# Patient Record
Sex: MALE | Race: BLACK OR AFRICAN AMERICAN | NOT HISPANIC OR LATINO | Employment: OTHER | URBAN - METROPOLITAN AREA
[De-identification: names, ages, dates, MRNs, and addresses within clinical notes are randomized per-mention and may not be internally consistent; named-entity substitution may affect disease eponyms.]

---

## 2017-01-13 RX ORDER — CARBAMAZEPINE 200 MG/1
TABLET, EXTENDED RELEASE ORAL
Qty: 60 TABLET | Refills: 0 | OUTPATIENT
Start: 2017-01-13

## 2017-01-13 RX ORDER — INSULIN DETEMIR 100 [IU]/ML
INJECTION, SOLUTION SUBCUTANEOUS
Refills: 0 | OUTPATIENT
Start: 2017-01-13

## 2017-01-13 RX ORDER — INSULIN ASPART 100 [IU]/ML
INJECTION, SOLUTION INTRAVENOUS; SUBCUTANEOUS
Qty: 15 SYRINGE | Refills: 0 | OUTPATIENT
Start: 2017-01-13

## 2018-05-18 ENCOUNTER — HOSPITAL ENCOUNTER (EMERGENCY)
Facility: HOSPITAL | Age: 38
Discharge: HOME | End: 2018-05-18
Attending: EMERGENCY MEDICINE
Payer: MEDICARE

## 2018-05-18 VITALS
HEIGHT: 69 IN | RESPIRATION RATE: 18 BRPM | SYSTOLIC BLOOD PRESSURE: 107 MMHG | TEMPERATURE: 97 F | OXYGEN SATURATION: 100 % | BODY MASS INDEX: 20.73 KG/M2 | WEIGHT: 140 LBS | DIASTOLIC BLOOD PRESSURE: 67 MMHG | HEART RATE: 72 BPM

## 2018-05-18 DIAGNOSIS — E86.0 DEHYDRATION: ICD-10-CM

## 2018-05-18 DIAGNOSIS — R73.9 HYPERGLYCEMIA: Primary | ICD-10-CM

## 2018-05-18 LAB
ALBUMIN SERPL BCP-MCNC: 3.3 G/DL
ALLENS TEST: ABNORMAL
ALP SERPL-CCNC: 113 U/L
ALT SERPL W/O P-5'-P-CCNC: 9 U/L
ANION GAP SERPL CALC-SCNC: 12 MMOL/L
AST SERPL-CCNC: 9 U/L
B-OH-BUTYR BLD STRIP-SCNC: 3.4 MMOL/L
BACTERIA #/AREA URNS HPF: NORMAL /HPF
BASOPHILS # BLD AUTO: 0.03 K/UL
BASOPHILS NFR BLD: 0.3 %
BILIRUB SERPL-MCNC: 0.3 MG/DL
BILIRUB UR QL STRIP: NEGATIVE
BUN SERPL-MCNC: 19 MG/DL
CALCIUM SERPL-MCNC: 9.4 MG/DL
CHLORIDE SERPL-SCNC: 94 MMOL/L
CLARITY UR: CLEAR
CO2 SERPL-SCNC: 24 MMOL/L
COLOR UR: ABNORMAL
CREAT SERPL-MCNC: 1.7 MG/DL
DIFFERENTIAL METHOD: ABNORMAL
EOSINOPHIL # BLD AUTO: 0 K/UL
EOSINOPHIL NFR BLD: 0.2 %
ERYTHROCYTE [DISTWIDTH] IN BLOOD BY AUTOMATED COUNT: 12.7 %
EST. GFR  (AFRICAN AMERICAN): 58 ML/MIN/1.73 M^2
EST. GFR  (NON AFRICAN AMERICAN): 50 ML/MIN/1.73 M^2
GLUCOSE SERPL-MCNC: 465 MG/DL
GLUCOSE UR QL STRIP: ABNORMAL
HCO3 UR-SCNC: 26.6 MMOL/L (ref 24–28)
HCT VFR BLD AUTO: 39 %
HGB BLD-MCNC: 13.2 G/DL
HGB UR QL STRIP: NEGATIVE
KETONES UR QL STRIP: ABNORMAL
LEUKOCYTE ESTERASE UR QL STRIP: NEGATIVE
LIPASE SERPL-CCNC: 22 U/L
LYMPHOCYTES # BLD AUTO: 0.8 K/UL
LYMPHOCYTES NFR BLD: 9.2 %
MCH RBC QN AUTO: 25.2 PG
MCHC RBC AUTO-ENTMCNC: 33.8 G/DL
MCV RBC AUTO: 75 FL
MICROSCOPIC COMMENT: NORMAL
MONOCYTES # BLD AUTO: 0.6 K/UL
MONOCYTES NFR BLD: 7 %
NEUTROPHILS # BLD AUTO: 7.4 K/UL
NEUTROPHILS NFR BLD: 83.1 %
NITRITE UR QL STRIP: NEGATIVE
OSMOLALITY SERPL: 302 MOSM/KG
PCO2 BLDA: 55.6 MMHG (ref 35–45)
PH SMN: 7.29 [PH] (ref 7.35–7.45)
PH UR STRIP: 5 [PH] (ref 5–8)
PLATELET # BLD AUTO: 322 K/UL
PMV BLD AUTO: 11.2 FL
PO2 BLDA: 16 MMHG (ref 40–60)
POC BE: -1 MMOL/L
POC SATURATED O2: 18 % (ref 95–100)
POC TCO2: 28 MMOL/L (ref 24–29)
POCT GLUCOSE: 213 MG/DL (ref 70–110)
POCT GLUCOSE: 286 MG/DL (ref 70–110)
POTASSIUM SERPL-SCNC: 4.6 MMOL/L
PROT SERPL-MCNC: 7.2 G/DL
PROT UR QL STRIP: NEGATIVE
RBC # BLD AUTO: 5.23 M/UL
SAMPLE: ABNORMAL
SITE: ABNORMAL
SODIUM SERPL-SCNC: 130 MMOL/L
SP GR UR STRIP: 1.02 (ref 1–1.03)
URN SPEC COLLECT METH UR: ABNORMAL
UROBILINOGEN UR STRIP-ACNC: NEGATIVE EU/DL
WBC # BLD AUTO: 8.96 K/UL
WBC #/AREA URNS HPF: 1 /HPF (ref 0–5)
YEAST URNS QL MICRO: NORMAL

## 2018-05-18 PROCEDURE — 63600175 PHARM REV CODE 636 W HCPCS: Performed by: EMERGENCY MEDICINE

## 2018-05-18 PROCEDURE — 82010 KETONE BODYS QUAN: CPT

## 2018-05-18 PROCEDURE — 81000 URINALYSIS NONAUTO W/SCOPE: CPT

## 2018-05-18 PROCEDURE — 96361 HYDRATE IV INFUSION ADD-ON: CPT

## 2018-05-18 PROCEDURE — 82962 GLUCOSE BLOOD TEST: CPT

## 2018-05-18 PROCEDURE — 99284 EMERGENCY DEPT VISIT MOD MDM: CPT | Mod: 25

## 2018-05-18 PROCEDURE — 80053 COMPREHEN METABOLIC PANEL: CPT

## 2018-05-18 PROCEDURE — 85025 COMPLETE CBC W/AUTO DIFF WBC: CPT

## 2018-05-18 PROCEDURE — 25000003 PHARM REV CODE 250: Performed by: EMERGENCY MEDICINE

## 2018-05-18 PROCEDURE — 83690 ASSAY OF LIPASE: CPT

## 2018-05-18 PROCEDURE — 96374 THER/PROPH/DIAG INJ IV PUSH: CPT

## 2018-05-18 PROCEDURE — 83930 ASSAY OF BLOOD OSMOLALITY: CPT

## 2018-05-18 PROCEDURE — 82803 BLOOD GASES ANY COMBINATION: CPT

## 2018-05-18 RX ORDER — ONDANSETRON 2 MG/ML
4 INJECTION INTRAMUSCULAR; INTRAVENOUS
Status: COMPLETED | OUTPATIENT
Start: 2018-05-18 | End: 2018-05-18

## 2018-05-18 RX ADMIN — SODIUM CHLORIDE 1000 ML: 0.9 INJECTION, SOLUTION INTRAVENOUS at 02:05

## 2018-05-18 RX ADMIN — ONDANSETRON 4 MG: 2 INJECTION INTRAMUSCULAR; INTRAVENOUS at 02:05

## 2018-05-18 NOTE — ED PROVIDER NOTES
Encounter Date: 5/18/2018    SCRIBE #1 NOTE: I, Deidre Correia, am scribing for, and in the presence of,  Luis Aragon MD. I have scribed the following portions of the note - Other sections scribed: HPI and ROS.       History     Chief Complaint   Patient presents with    Hyperglycemia     polyuria and hunger since yesterday. CBG >500 at the [facility].      CC: Hyperglycemia  Patient arrived via EMS    HPI: This 38 male who has anxiety, depression, diabetes mellitus, glaucoma, hyperlipidemia, hypertension, and seizures presents to the ED for an evaluation for hyperglycemia.  Patient reports of polydipsia and polyuria that began on yesterday.  EMS reports the patient had a  while at the [employer] day center.  Patient denies fever, chills, cough, nausea, emesis, abdominal pain, or any other associated symptoms.  No prior tx.  No alleviating factors.      The history is provided by the patient. No  was used.     Review of patient's allergies indicates:  No Known Allergies  Past Medical History:   Diagnosis Date    Anxiety     Depression     Diabetes mellitus     Glaucoma     Hyperlipemia     Hypertension     Seizures      History reviewed. No pertinent surgical history.  History reviewed. No pertinent family history.  Social History   Substance Use Topics    Smoking status: Never Smoker    Smokeless tobacco: Never Used    Alcohol use No     Review of Systems   Constitutional: Negative for chills and fever.   HENT: Negative for ear pain and sore throat.    Eyes: Negative for pain.   Respiratory: Negative for cough and shortness of breath.    Cardiovascular: Negative for chest pain.   Gastrointestinal: Negative for abdominal pain, diarrhea, nausea and vomiting.   Endocrine: Positive for polydipsia and polyuria.   Genitourinary: Negative for dysuria.   Musculoskeletal: Negative for back pain.   Skin: Negative for rash.   Neurological: Negative for headaches.       Physical Exam     Initial  Vitals [05/18/18 1240]   BP Pulse Resp Temp SpO2   96/65 86 18 97.4 °F (36.3 °C) 100 %      MAP       75.33         Physical Exam    Nursing note and vitals reviewed.  Constitutional: He appears well-developed and well-nourished.   HENT:   Head: Atraumatic.   Eyes: EOM are normal. Pupils are equal, round, and reactive to light.   Neck: Normal range of motion. Neck supple. No JVD present.   Cardiovascular: Normal rate, regular rhythm, normal heart sounds and intact distal pulses. Exam reveals no gallop and no friction rub.    No murmur heard.  Pulmonary/Chest: Breath sounds normal. No respiratory distress. He has no wheezes. He has no rhonchi. He has no rales. He exhibits no tenderness.   Abdominal: Soft. Bowel sounds are normal. He exhibits no distension and no mass. There is no tenderness. There is no rebound and no guarding.   Musculoskeletal: Normal range of motion.   Lymphadenopathy:     He has no cervical adenopathy.   Neurological: He is alert and oriented to person, place, and time. He has normal strength and normal reflexes. He displays normal reflexes. No cranial nerve deficit or sensory deficit.   Skin: Skin is warm and dry.   Psychiatric: He has a normal mood and affect. Thought content normal.         ED Course   Procedures  Labs Reviewed   CBC W/ AUTO DIFFERENTIAL - Abnormal; Notable for the following:        Result Value    Hemoglobin 13.2 (*)     Hematocrit 39.0 (*)     MCV 75 (*)     MCH 25.2 (*)     Lymph # 0.8 (*)     Gran% 83.1 (*)     Lymph% 9.2 (*)     All other components within normal limits   COMPREHENSIVE METABOLIC PANEL - Abnormal; Notable for the following:     Sodium 130 (*)     Chloride 94 (*)     Glucose 465 (*)     Creatinine 1.7 (*)     Albumin 3.3 (*)     AST 9 (*)     ALT 9 (*)     eGFR if  58 (*)     eGFR if non  50 (*)     All other components within normal limits    Narrative:     GLUCOSE critical result(s) called and verbal readback obtained from    BLU CASEY , 05/18/2018 14:33   URINALYSIS - Abnormal; Notable for the following:     Glucose, UA 3+ (*)     Ketones, UA 1+ (*)     All other components within normal limits   BETA - HYDROXYBUTYRATE, SERUM - Abnormal; Notable for the following:     Beta-Hydroxybutyrate 3.4 (*)     All other components within normal limits   OSMOLALITY - Abnormal; Notable for the following:     Osmolality 302 (*)     All other components within normal limits   ISTAT PROCEDURE - Abnormal; Notable for the following:     POC PH 7.288 (*)     POC PCO2 55.6 (*)     POC PO2 16 (*)     POC SATURATED O2 18 (*)     All other components within normal limits   POCT GLUCOSE - Abnormal; Notable for the following:     POCT Glucose 286 (*)     All other components within normal limits   POCT GLUCOSE - Abnormal; Notable for the following:     POCT Glucose 213 (*)     All other components within normal limits   LIPASE   URINALYSIS MICROSCOPIC        PATIENT PRESENTS WITH HYPERGLYCEMIA.  MILD NAUSEA.  POSITIVE THIRST AND URINARY FREQUENCY. NO ABDOMINAL PAIN. NO SHORTNESS OF BREATH.  PATIENT'S BETA HYDROXYBUTYRATE IS MILDLY ELEVATED.  THE PATIENT HAS A VBG 7.288 WHICH WOULD BE ABOUT 7.32 CORRECTED.  NO ANION GAP.  MILD ELEVATION OF SERUM OSMOLALITY.  NO MENTAL STATUS CHANGE.  PATIENT HYDRATED AND BLOOD SUGAR LOWER 02/02/2013.  PATIENT FEELS WELL WITH NO SYMPTOMATOLOGY.  PATIENT IS NOT ON [facility] FOR BLOOD SUGAR CONTROL AND FURTHER HYDRATION.  PATIENT HAS HIS INSULIN HOME TO TAKE.  URGED COMPLIANCE WITH MEDICATIONS AND FOLLOW-UP WITH PRIMARY CARE.  RETURN FOR NEW OR WORSENING SYMPTOMS.                Scribe Attestation:   Scribe #1: I performed the above scribed service and the documentation accurately describes the services I performed. I attest to the accuracy of the note.    Attending Attestation:           Physician Attestation for Scribe:  Physician Attestation Statement for Scribe #1: I, Luis Aragon MD, reviewed  documentation, as scribed by Deidre Correia in my presence, and it is both accurate and complete.                    Clinical Impression:   The primary encounter diagnosis was Hyperglycemia. A diagnosis of Dehydration was also pertinent to this visit.                           Luis Aragon MD  06/14/18 0959

## 2018-10-02 ENCOUNTER — HOSPITAL ENCOUNTER (OUTPATIENT)
Facility: HOSPITAL | Age: 38
Discharge: HOME | End: 2018-10-03
Attending: EMERGENCY MEDICINE | Admitting: EMERGENCY MEDICINE
Payer: MEDICARE

## 2018-10-02 DIAGNOSIS — E86.0 DEHYDRATION: Primary | ICD-10-CM

## 2018-10-02 DIAGNOSIS — R27.0 ATAXIA: ICD-10-CM

## 2018-10-02 PROBLEM — I95.1 ORTHOSTATIC HYPOTENSION: Status: ACTIVE | Noted: 2018-10-02

## 2018-10-02 PROBLEM — G93.41 ACUTE METABOLIC ENCEPHALOPATHY: Status: ACTIVE | Noted: 2018-10-02

## 2018-10-02 PROBLEM — N17.9 AKI (ACUTE KIDNEY INJURY): Status: ACTIVE | Noted: 2018-10-02

## 2018-10-02 PROBLEM — E11.65 TYPE 2 DIABETES MELLITUS WITH HYPERGLYCEMIA, WITH LONG-TERM CURRENT USE OF INSULIN: Status: ACTIVE | Noted: 2018-10-02

## 2018-10-02 PROBLEM — E10.9 DIABETES MELLITUS TYPE 1, UNCOMPLICATED: Status: ACTIVE | Noted: 2018-10-02

## 2018-10-02 PROBLEM — Z79.4 TYPE 2 DIABETES MELLITUS WITH HYPERGLYCEMIA, WITH LONG-TERM CURRENT USE OF INSULIN: Status: ACTIVE | Noted: 2018-10-02

## 2018-10-02 LAB
ALBUMIN SERPL BCP-MCNC: 3.7 G/DL
ALP SERPL-CCNC: 143 U/L
ALT SERPL W/O P-5'-P-CCNC: 14 U/L
ANION GAP SERPL CALC-SCNC: 13 MMOL/L
AST SERPL-CCNC: 14 U/L
BACTERIA #/AREA URNS AUTO: NORMAL /HPF
BASOPHILS # BLD AUTO: 0.07 K/UL
BASOPHILS NFR BLD: 0.7 %
BILIRUB SERPL-MCNC: 0.2 MG/DL
BILIRUB UR QL STRIP: NEGATIVE
BUN SERPL-MCNC: 19 MG/DL
BUN SERPL-MCNC: 20 MG/DL (ref 6–30)
CALCIUM SERPL-MCNC: 10.4 MG/DL
CARBAMAZEPINE SERPL-MCNC: <1.9 UG/ML
CHLORIDE SERPL-SCNC: 100 MMOL/L
CHLORIDE SERPL-SCNC: 98 MMOL/L (ref 95–110)
CLARITY UR REFRACT.AUTO: CLEAR
CO2 SERPL-SCNC: 26 MMOL/L
COLOR UR AUTO: ABNORMAL
CREAT SERPL-MCNC: 1.3 MG/DL (ref 0.5–1.4)
CREAT SERPL-MCNC: 1.5 MG/DL
DIFFERENTIAL METHOD: ABNORMAL
EOSINOPHIL # BLD AUTO: 0.2 K/UL
EOSINOPHIL NFR BLD: 1.6 %
ERYTHROCYTE [DISTWIDTH] IN BLOOD BY AUTOMATED COUNT: 12.6 %
EST. GFR  (AFRICAN AMERICAN): >60 ML/MIN/1.73 M^2
EST. GFR  (NON AFRICAN AMERICAN): 58.6 ML/MIN/1.73 M^2
ETHANOL SERPL-MCNC: <10 MG/DL
GLUCOSE SERPL-MCNC: 82 MG/DL
GLUCOSE SERPL-MCNC: 88 MG/DL (ref 70–110)
GLUCOSE UR QL STRIP: ABNORMAL
HCT VFR BLD AUTO: 40.7 %
HCT VFR BLD CALC: 40 %PCV (ref 36–54)
HGB BLD-MCNC: 12.8 G/DL
HGB UR QL STRIP: NEGATIVE
IMM GRANULOCYTES # BLD AUTO: 0.04 K/UL
IMM GRANULOCYTES NFR BLD AUTO: 0.4 %
KETONES UR QL STRIP: ABNORMAL
LACTATE SERPL-SCNC: 2 MMOL/L
LACTATE SERPL-SCNC: 3.2 MMOL/L
LEUKOCYTE ESTERASE UR QL STRIP: NEGATIVE
LYMPHOCYTES # BLD AUTO: 2.9 K/UL
LYMPHOCYTES NFR BLD: 27.3 %
MAGNESIUM SERPL-MCNC: 2.5 MG/DL
MCH RBC QN AUTO: 25.4 PG
MCHC RBC AUTO-ENTMCNC: 31.4 G/DL
MCV RBC AUTO: 81 FL
MICROSCOPIC COMMENT: NORMAL
MONOCYTES # BLD AUTO: 1.2 K/UL
MONOCYTES NFR BLD: 10.9 %
NEUTROPHILS # BLD AUTO: 6.4 K/UL
NEUTROPHILS NFR BLD: 59.1 %
NITRITE UR QL STRIP: NEGATIVE
NRBC BLD-RTO: 0 /100 WBC
PH UR STRIP: 5 [PH] (ref 5–8)
PLATELET # BLD AUTO: 270 K/UL
PMV BLD AUTO: 11.4 FL
POC IONIZED CALCIUM: 1.23 MMOL/L (ref 1.06–1.42)
POC TCO2 (MEASURED): 29 MMOL/L (ref 23–29)
POCT GLUCOSE: 104 MG/DL (ref 70–110)
POCT GLUCOSE: 272 MG/DL (ref 70–110)
POCT GLUCOSE: 297 MG/DL (ref 70–110)
POCT GLUCOSE: 93 MG/DL (ref 70–110)
POCT GLUCOSE: 96 MG/DL (ref 70–110)
POTASSIUM BLD-SCNC: 3.5 MMOL/L (ref 3.5–5.1)
POTASSIUM SERPL-SCNC: 3.6 MMOL/L
PROT SERPL-MCNC: 8.4 G/DL
PROT UR QL STRIP: NEGATIVE
RBC # BLD AUTO: 5.04 M/UL
RBC #/AREA URNS AUTO: 1 /HPF (ref 0–4)
SAMPLE: NORMAL
SODIUM BLD-SCNC: 140 MMOL/L (ref 136–145)
SODIUM SERPL-SCNC: 139 MMOL/L
SP GR UR STRIP: 1.03 (ref 1–1.03)
SQUAMOUS #/AREA URNS AUTO: 0 /HPF
TROPONIN I SERPL DL<=0.01 NG/ML-MCNC: <0.006 NG/ML
URN SPEC COLLECT METH UR: ABNORMAL
UROBILINOGEN UR STRIP-ACNC: NEGATIVE EU/DL
WBC # BLD AUTO: 10.75 K/UL
WBC #/AREA URNS AUTO: 0 /HPF (ref 0–5)
YEAST UR QL AUTO: NORMAL

## 2018-10-02 PROCEDURE — 25000003 PHARM REV CODE 250: Performed by: INTERNAL MEDICINE

## 2018-10-02 PROCEDURE — 85025 COMPLETE CBC W/AUTO DIFF WBC: CPT

## 2018-10-02 PROCEDURE — 84484 ASSAY OF TROPONIN QUANT: CPT

## 2018-10-02 PROCEDURE — G0378 HOSPITAL OBSERVATION PER HR: HCPCS

## 2018-10-02 PROCEDURE — 93010 ELECTROCARDIOGRAM REPORT: CPT | Mod: ,,, | Performed by: INTERNAL MEDICINE

## 2018-10-02 PROCEDURE — 81001 URINALYSIS AUTO W/SCOPE: CPT

## 2018-10-02 PROCEDURE — 63600175 PHARM REV CODE 636 W HCPCS: Performed by: INTERNAL MEDICINE

## 2018-10-02 PROCEDURE — 83735 ASSAY OF MAGNESIUM: CPT

## 2018-10-02 PROCEDURE — 80156 ASSAY CARBAMAZEPINE TOTAL: CPT

## 2018-10-02 PROCEDURE — 99285 EMERGENCY DEPT VISIT HI MDM: CPT | Mod: 25

## 2018-10-02 PROCEDURE — 96360 HYDRATION IV INFUSION INIT: CPT

## 2018-10-02 PROCEDURE — 80320 DRUG SCREEN QUANTALCOHOLS: CPT

## 2018-10-02 PROCEDURE — 96361 HYDRATE IV INFUSION ADD-ON: CPT

## 2018-10-02 PROCEDURE — 93005 ELECTROCARDIOGRAM TRACING: CPT

## 2018-10-02 PROCEDURE — 83036 HEMOGLOBIN GLYCOSYLATED A1C: CPT

## 2018-10-02 PROCEDURE — 99284 EMERGENCY DEPT VISIT MOD MDM: CPT | Mod: ,,, | Performed by: EMERGENCY MEDICINE

## 2018-10-02 PROCEDURE — 25000003 PHARM REV CODE 250: Performed by: EMERGENCY MEDICINE

## 2018-10-02 PROCEDURE — 82962 GLUCOSE BLOOD TEST: CPT | Mod: 59

## 2018-10-02 PROCEDURE — 80053 COMPREHEN METABOLIC PANEL: CPT

## 2018-10-02 PROCEDURE — 87040 BLOOD CULTURE FOR BACTERIA: CPT

## 2018-10-02 PROCEDURE — 83605 ASSAY OF LACTIC ACID: CPT | Mod: 91

## 2018-10-02 PROCEDURE — 99220 PR INITIAL OBSERVATION CARE,LEVL III: CPT | Mod: ,,, | Performed by: INTERNAL MEDICINE

## 2018-10-02 RX ORDER — RAMELTEON 8 MG/1
8 TABLET ORAL NIGHTLY PRN
Status: DISCONTINUED | OUTPATIENT
Start: 2018-10-02 | End: 2018-10-03 | Stop reason: HOSPADM

## 2018-10-02 RX ORDER — INSULIN ASPART 100 [IU]/ML
0-5 INJECTION, SOLUTION INTRAVENOUS; SUBCUTANEOUS
Status: DISCONTINUED | OUTPATIENT
Start: 2018-10-02 | End: 2018-10-03 | Stop reason: HOSPADM

## 2018-10-02 RX ORDER — IBUPROFEN 200 MG
16 TABLET ORAL
Status: DISCONTINUED | OUTPATIENT
Start: 2018-10-02 | End: 2018-10-03 | Stop reason: HOSPADM

## 2018-10-02 RX ORDER — GLUCAGON 1 MG
1 KIT INJECTION
Status: DISCONTINUED | OUTPATIENT
Start: 2018-10-02 | End: 2018-10-03 | Stop reason: HOSPADM

## 2018-10-02 RX ORDER — ONDANSETRON 8 MG/1
8 TABLET, ORALLY DISINTEGRATING ORAL EVERY 8 HOURS PRN
Status: DISCONTINUED | OUTPATIENT
Start: 2018-10-02 | End: 2018-10-03 | Stop reason: HOSPADM

## 2018-10-02 RX ORDER — SODIUM CHLORIDE 9 MG/ML
1000 INJECTION, SOLUTION INTRAVENOUS
Status: COMPLETED | OUTPATIENT
Start: 2018-10-02 | End: 2018-10-02

## 2018-10-02 RX ORDER — ATORVASTATIN CALCIUM 20 MG/1
80 TABLET, FILM COATED ORAL DAILY
Status: DISCONTINUED | OUTPATIENT
Start: 2018-10-03 | End: 2018-10-03 | Stop reason: HOSPADM

## 2018-10-02 RX ORDER — SODIUM CHLORIDE 0.9 % (FLUSH) 0.9 %
5 SYRINGE (ML) INJECTION
Status: DISCONTINUED | OUTPATIENT
Start: 2018-10-02 | End: 2018-10-03 | Stop reason: HOSPADM

## 2018-10-02 RX ORDER — CARBAMAZEPINE 200 MG/1
200 TABLET, EXTENDED RELEASE ORAL 2 TIMES DAILY
Status: DISCONTINUED | OUTPATIENT
Start: 2018-10-02 | End: 2018-10-03 | Stop reason: HOSPADM

## 2018-10-02 RX ORDER — IBUPROFEN 200 MG
24 TABLET ORAL
Status: DISCONTINUED | OUTPATIENT
Start: 2018-10-02 | End: 2018-10-03 | Stop reason: HOSPADM

## 2018-10-02 RX ORDER — ONDANSETRON 2 MG/ML
4 INJECTION INTRAMUSCULAR; INTRAVENOUS EVERY 8 HOURS PRN
Status: DISCONTINUED | OUTPATIENT
Start: 2018-10-02 | End: 2018-10-03 | Stop reason: HOSPADM

## 2018-10-02 RX ORDER — OLMESARTAN MEDOXOMIL 20 MG/1
20 TABLET ORAL DAILY
Status: ON HOLD | COMMUNITY
End: 2018-10-03 | Stop reason: HOSPADM

## 2018-10-02 RX ORDER — ACETAMINOPHEN 325 MG/1
650 TABLET ORAL EVERY 4 HOURS PRN
Status: DISCONTINUED | OUTPATIENT
Start: 2018-10-02 | End: 2018-10-03 | Stop reason: HOSPADM

## 2018-10-02 RX ORDER — ATORVASTATIN CALCIUM 80 MG/1
80 TABLET, FILM COATED ORAL DAILY
COMMUNITY

## 2018-10-02 RX ADMIN — SODIUM CHLORIDE 1000 ML: 0.9 INJECTION, SOLUTION INTRAVENOUS at 10:10

## 2018-10-02 RX ADMIN — INSULIN ASPART 3 UNITS: 100 INJECTION, SOLUTION INTRAVENOUS; SUBCUTANEOUS at 05:10

## 2018-10-02 RX ADMIN — INSULIN ASPART 1 UNITS: 100 INJECTION, SOLUTION INTRAVENOUS; SUBCUTANEOUS at 09:10

## 2018-10-02 RX ADMIN — SODIUM CHLORIDE 1000 ML: 0.9 INJECTION, SOLUTION INTRAVENOUS at 02:10

## 2018-10-02 RX ADMIN — CARBAMAZEPINE 200 MG: 200 TABLET, EXTENDED RELEASE ORAL at 09:10

## 2018-10-02 NOTE — ED NOTES
Patient identifiers verified and correct for Mr Field  C/C: Hypogycemia  APPEARANCE: awake and alert in NAD.caregiver states poor vision.,  SKIN: warm, dry and intact. No breakdown or bruising.  MUSCULOSKELETAL: Patient moving all extremities spontaneously, no obvious swelling or deformities noted. Ambulates independently.  RESPIRATORY: Denies shortness of breath.Respirations unlabored.   CARDIAC: Denies CP, 2+ distal pulses; no peripheral edema  ABDOMEN: S/ND/NT, Denies nausea  : voids spontaneously, denies difficulty  Neurologic: AAO x 4; follows commands equal strength in all extremities; denies numbness/tingling. Denies dizziness Denies weakness

## 2018-10-02 NOTE — ASSESSMENT & PLAN NOTE
Suspect due to poor PO intake. No syncoope, no SIRS criteria to raise concern for infection, no infectious symptoms. CXR normal, UA negative.   - orthostatic vitals  - Vitals Q4hrs  - encourage oral hydration

## 2018-10-02 NOTE — ED NOTES
Pt sitting up in bed watching TV, AOX3, resp even/unlabored, skin w/d, pt remains on cardiac monitor, NAD at this time.

## 2018-10-02 NOTE — ED NOTES
Patient lying in bed. Continuous cardiac monitoring,  Alarms set and turned on for monitor, Plan of care provided and explained to patient and family. Plan of care includes: observe and reassure, position of comfort. AOx 3, Respirations even and unlabored, skin W/D, ALONZO well. Patient offers no complaints at this time,Bed placed in low position, side rails up x 2, call light is within reach of patient or family.  Family at bedside. No distress noted. Will continue to monitor.

## 2018-10-02 NOTE — ED NOTES
"Pt ambulated with assistance, unsteady gait noted, pt reports "my balance is off", denies dizziness.   "

## 2018-10-02 NOTE — H&P
"[facility] Hospital Medicine  History & Physical    Patient Name: Pavel Field  MRN: [id number]  Admission Date: 10/2/2018  Attending Physician: Penelope Elena MD  Primary Care Provider: [facility] Medicine Team: Networked reference to record PCT  Penelope Elena MD     Patient information was obtained from patient, past medical records and ER records.     Subjective:     Principal Problem:Dehydration    Chief Complaint:   Chief Complaint   Patient presents with    Fatigue     having tremors and feeling weak.  Care taker states he almost passed out , blood sugar was 350. CBG in triage 96. Pt was shaky and stumbling at day care.  Pt took insulin this  am Took 40u Novolog        HPI: Mr. Field is a 38 year old man with T2DM (diagnosed age 23, A1c >20% at [facility] 7/2018), HTN, HLD, has a 24 hour caregiver, who presents with acute encephalopathy and hypotension.    He was well this morning, his blood sugar was noted to be elevated at 358 by his caregiver. He injects his own insulin, gave himself 40 units of aspart this morning. After injecting insulin, he was noted to be "stumbling and shaking," appeared unwell, with blurred vision and confusion.     In the Emergency Department, he was hypotensive with BP drop to 64/40, which resolved with 2L IV NS. His blood sugar was notable at 82, creatinine elevated to 1.5 (normal baseline). Lactate was initially 3.2, improved to 2.0 after fluids. Carbamazepine level was undetectable, alcohol negative, UA with glucosuria.     On my interview, he feels well. Recalls giving himself insulin this morning but doesn't think he had too much. No major complaints right now. He endorses a good appetite.     Past Medical History:   Diagnosis Date    Anxiety     Depression     Diabetes mellitus     DKA (diabetic ketoacidoses)     Glaucoma     Hyperlipemia     Hypertension     Seizures        History reviewed. No pertinent surgical " history.    Review of patient's allergies indicates:  No Known Allergies    No current facility-administered medications on file prior to encounter.      Current Outpatient Medications on File Prior to Encounter   Medication Sig    atorvastatin (LIPITOR) 80 MG tablet Take 80 mg by mouth once daily.    carbamazepine (TEGRETOL XR) 200 MG 12 hr tablet Take 1 tablet (200 mg total) by mouth 2 (two) times daily.    insulin aspart (NOVOLOG) 100 unit/mL InPn pen Inject 4 Units into the skin 3 (three) times daily with meals. (Patient taking differently: Inject 4 Units into the skin 3 (three) times daily with meals. Took 4 units this)    olmesartan (BENICAR) 20 MG tablet Take 20 mg by mouth once daily.    insulin detemir (LEVEMIR FLEXTOUCH) 100 unit/mL (3 mL) SubQ InPn pen Inject 15 Units into the skin once daily.    [DISCONTINUED] simvastatin (ZOCOR) 20 MG tablet Take 1 tablet (20 mg total) by mouth every evening.     Family History     None        Tobacco Use    Smoking status: Never Smoker    Smokeless tobacco: Never Used   Substance and Sexual Activity    Alcohol use: No    Drug use: No    Sexual activity: Not on file     Review of Systems   Constitutional: Negative for appetite change, fatigue and fever.   HENT: Negative for congestion, postnasal drip and sinus pressure.    Eyes: Negative for photophobia and visual disturbance.   Respiratory: Negative for cough and shortness of breath.    Cardiovascular: Negative for chest pain and palpitations.   Gastrointestinal: Negative for abdominal distention, diarrhea, nausea and vomiting.   Endocrine: Negative for polyphagia and polyuria.   Genitourinary: Negative for dysuria and frequency.   Musculoskeletal: Negative for back pain, joint swelling and myalgias.   Neurological: Negative for dizziness, seizures, syncope and numbness.   Psychiatric/Behavioral: Positive for confusion. Negative for agitation.     Objective:     Vital Signs (Most Recent):  Temp: 97.8 °F  (36.6 °C) (10/02/18 1626)  Pulse: 77 (10/02/18 1626)  Resp: 17 (10/02/18 1626)  BP: 134/89 (10/02/18 1626)  SpO2: 99 % (10/02/18 1626) Vital Signs (24h Range):  Temp:  [96.4 °F (35.8 °C)-98.6 °F (37 °C)] 97.8 °F (36.6 °C)  Pulse:  [] 77  Resp:  [17-18] 17  SpO2:  [98 %-100 %] 99 %  BP: ()/(40-89) 134/89     Weight: 58.1 kg (128 lb)  Body mass index is 18.9 kg/m².    Physical Exam   Constitutional: He is oriented to person, place, and time. He appears well-developed. No distress.   HENT:   Head: Normocephalic and atraumatic.   Mouth/Throat: Oropharynx is clear and moist.   Eyes: Conjunctivae and EOM are normal. Pupils are equal, round, and reactive to light. No scleral icterus.   Neck: Normal range of motion. Neck supple. No JVD present. No thyromegaly present.   Cardiovascular: Normal rate, regular rhythm and normal heart sounds. Exam reveals no gallop and no friction rub.   No murmur heard.  Pulmonary/Chest: Effort normal and breath sounds normal. He has no wheezes. He has no rales.   Abdominal: Soft. Bowel sounds are normal. He exhibits no distension. There is no tenderness. There is no guarding.   Musculoskeletal: Normal range of motion. He exhibits no edema.   Neurological: He is alert and oriented to person, place, and time.   Skin: Skin is warm and dry. Capillary refill takes less than 2 seconds. No rash noted.   Vitals reviewed.        CRANIAL NERVES     CN III, IV, VI   Pupils are equal, round, and reactive to light.  Extraocular motions are normal.        Significant Labs:   CBC:   Recent Labs   Lab  10/02/18   0925  10/02/18   0930   WBC   --   10.75   HGB   --   12.8*   HCT  40  40.7   PLT   --   270     CMP:   Recent Labs   Lab  10/02/18   0930   NA  139   K  3.6   CL  100   CO2  26   GLU  82   BUN  19   CREATININE  1.5*   CALCIUM  10.4   PROT  8.4   ALBUMIN  3.7   BILITOT  0.2   ALKPHOS  143*   AST  14   ALT  14   ANIONGAP  13   EGFRNONAA  58.6*     All pertinent labs within the past 24  hours have been reviewed.    Significant Imaging: I have reviewed all pertinent imaging results/findings within the past 24 hours.    Assessment/Plan:     * Dehydration    Suspect due to poor PO intake. No syncoope, no SIRS criteria to raise concern for infection, no infectious symptoms. CXR normal, UA negative.   - orthostatic vitals  - Vitals Q4hrs  - encourage oral hydration        Acute metabolic encephalopathy    Suspect due to hypoglycemia after large dose of short acting insulin. Home dose is 4u, patient received 40u at home. Symptoms are now completely resolved. CT head negative  - BMP daily         Type 2 diabetes mellitus with hyperglycemia, with long-term current use of insulin    Took too much insulin this morning, relatively hypoglycemic on presentation given his recent A1c of over 20%. Asymptomatic at this time. Given his history of medication admin issues, will need simplified insulin  - insulin aspart low dose correction with accuchecks qachs  - hold home detemir 15 daily, aspart 4 TID  - HbA1c            Essential hypertension    Hold home olmesartan in BRYSON and hypotension  - labetalol PRN SBP >180        Seizure disorder    No report of seizure activity. CBZ level not detectable.  - carbamazepine 200mg po bid          BRYSON (acute kidney injury)    Suspect prerenal in the setting of hypotension with SBP 60  - S/p IV fluids  - oral rehydration   - BMP daily               VTE Risk Mitigation (From admission, onward)        Ordered     Place ROSHNI hose  Until discontinued      10/02/18 1616     IP VTE LOW RISK PATIENT  Once      10/02/18 1616            Penelope Elena M.D., M.P.H.  Department of Hospital Medicine  [facility]  (pager) [phone]  (spect) [phone]

## 2018-10-02 NOTE — HPI
"Mr. Field is a 38 year old man with T2DM (diagnosed age 23, A1c >20% at [facility] 7/2018), HTN, HLD, has a 24 hour caregiver, who presents with acute encephalopathy and hypotension.    He was well this morning, his blood sugar was noted to be elevated at 358 by his caregiver. He injects his own insulin, gave himself 40 units of aspart this morning. After injecting insulin, he was noted to be "stumbling and shaking," appeared unwell, with blurred vision and confusion.     In the Emergency Department, he was hypotensive with BP drop to 64/40, which resolved with 2L IV NS. His blood sugar was notable at 82, creatinine elevated to 1.5 (normal baseline). Lactate was initially 3.2, improved to 2.0 after fluids. Carbamazepine level was undetectable, alcohol negative, UA with glucosuria.     On my interview, he feels well. Recalls giving himself insulin this morning but doesn't think he had too much. No major complaints right now. He endorses a good appetite.   "

## 2018-10-02 NOTE — ASSESSMENT & PLAN NOTE
Suspect due to hypoglycemia after large dose of short acting insulin. Home dose is 4u, patient received 40u at home. Symptoms are now completely resolved. CT head negative  - BMP daily

## 2018-10-02 NOTE — ASSESSMENT & PLAN NOTE
Suspect prerenal in the setting of hypotension with SBP 60  - S/p IV fluids  - oral rehydration   - BMP daily

## 2018-10-02 NOTE — ED TRIAGE NOTES
"Patient states his blood sugar was high this am, primary caregiver (24 hour care) states patient is "messing around" with his blood sugars and shows them past Bg levels. This am he told caregiver his , upon caregiver recheck 358. Patient gave himself 40 units Novolog. Goes to day care Tues and Thursday. Caregiver states he was "stumbling and shaking" after BG dropped.    "

## 2018-10-02 NOTE — ED NOTES
Pt unable to ambulate without assistance - states he feels dizzy when he stands up - patient diaphoretic - another glucose check done

## 2018-10-02 NOTE — ASSESSMENT & PLAN NOTE
Took too much insulin this morning, relatively hypoglycemic on presentation given his recent A1c of over 20%. Asymptomatic at this time. Given his history of medication admin issues, will need simplified insulin  - insulin aspart low dose correction with accuchecks qachs  - hold home detemir 15 daily, aspart 4 TID  - HbA1c

## 2018-10-02 NOTE — ED PROVIDER NOTES
"Encounter Date: 10/2/2018    SCRIBE #1 NOTE: I, Son Yolanda, am scribing for, and in the presence of,  Dr. Asencio . I have scribed the following portions of the note - Other sections scribed: HPI ROS PE.       History     Chief Complaint   Patient presents with    Fatigue     having tremors and feeling weak.  Care taker states he almost passed out , blood sugar was 350. CBG in triage 96. Pt was shaky and stumbling at day care.  Pt took insulin this  am Took 40u Novolog     Time patient was seen by the provider: 9:15 AM      The patient is a 38 y.o. male with co-morbidities including: anxiety, depression, DM, HLD, HTN who presents to the ED with a complaint of fatigue since this morning. Pt has 24 hour caretaker for "home health." According to the caretaker, she reports that the pt was feeling well this morning. However, when she checked his blood sugar, it was high. So, they gave him his medicine. After, she reports that patient was shaking and stumbling around. She notes that she tried to take him to [facility], but he almost fell and stated "my vision is blurry." So, she drove him to [facility]. She reports that on the way here he ate two boil eggs. The caretaker denies any similar episodes in the past. Now, his vision is normal. He denies weakness or pain. Denies EtOH or drug use.       The history is provided by a caregiver and the patient.     Review of patient's allergies indicates:  No Known Allergies  Past Medical History:   Diagnosis Date    Anxiety     Depression     Diabetes mellitus     DKA (diabetic ketoacidoses)     Glaucoma     Hyperlipemia     Hypertension     Seizures      History reviewed. No pertinent surgical history.  History reviewed. No pertinent family history.  Social History     Tobacco Use    Smoking status: Never Smoker    Smokeless tobacco: Never Used   Substance Use Topics    Alcohol use: No    Drug use: No     Review of Systems   Constitutional:        + fatigue  "   HENT: Negative.    Eyes: Negative.    Respiratory: Negative.    Cardiovascular: Negative.    Gastrointestinal: Negative.  Negative for abdominal pain.   Genitourinary: Negative.    Musculoskeletal: Negative.    Skin: Negative.    Neurological: Negative for weakness.       Physical Exam     Initial Vitals [10/02/18 0841]   BP Pulse Resp Temp SpO2   95/62 110 18 98.6 °F (37 °C) 98 %      MAP       --         Physical Exam    Nursing note and vitals reviewed.  Constitutional:   Thin, chronic ill-appearing    HENT:   Head: Normocephalic.   Mouth/Throat: Mucous membranes are normal.   Mucous Membranes: moist    Eyes:   Pupils are dilated bilaterally, reactive to light equally, has bidirectional nystagmus, horizontal nystagmus    Neck: Neck supple.   Cardiovascular: Regular rhythm. Tachycardia present.    Pulmonary/Chest:   Lungs clear    Abdominal: Soft. There is no tenderness.   Musculoskeletal: He exhibits no edema.   Neurological: No cranial nerve deficit or sensory deficit.   nystagmus as documented, he has no cranial nerves deficits, no pronator drift, no dysmetria, sensation intact bilaterally, unsteady gait   Skin: No rash noted.         ED Course   Procedures  Labs Reviewed   CBC W/ AUTO DIFFERENTIAL - Abnormal; Notable for the following components:       Result Value    Hemoglobin 12.8 (*)     MCV 81 (*)     MCH 25.4 (*)     MCHC 31.4 (*)     Mono # 1.2 (*)     All other components within normal limits   COMPREHENSIVE METABOLIC PANEL - Abnormal; Notable for the following components:    Creatinine 1.5 (*)     Alkaline Phosphatase 143 (*)     eGFR if non  58.6 (*)     All other components within normal limits    Narrative:     ADD ON CARBA AND TROP PER: GELACIO GUZMAN MD  10/02/2018  10:08   ADD ON ALC PER: GELACIO GUZMAN MD  10/02/2018  10:15    URINALYSIS, REFLEX TO URINE CULTURE - Abnormal; Notable for the following components:    Glucose, UA 3+ (*)     Ketones, UA Trace (*)     All  other components within normal limits    Narrative:     Preferred Collection Type->Urine, Clean Catch   LACTIC ACID, PLASMA - Abnormal; Notable for the following components:    Lactate (Lactic Acid) 3.2 (*)     All other components within normal limits    Narrative:     ADD ON CARBA AND TROP PER: GELACIO GUZMAN MD  10/02/2018  10:08   ADD ON ALC PER: GELACIO GUZMAN MD  10/02/2018  10:15    CARBAMAZEPINE LEVEL, TOTAL - Abnormal; Notable for the following components:    Carbamazepine Lvl <1.9 (*)     All other components within normal limits    Narrative:     ADD ON CARBA AND TROP PER: GELACIO GUZMAN MD  10/02/2018  10:08   ADD ON ALC PER: GELACIO GUZMAN MD  10/02/2018  10:15    MAGNESIUM    Narrative:     ADD ON CARBA AND TROP PER: GELACIO GUZMAN MD  10/02/2018  10:08   ADD ON ALC PER: GELACIO GUZMAN MD  10/02/2018  10:15    TROPONIN I   ALCOHOL,MEDICAL (ETHANOL)   CARBAMAZEPINE LEVEL, TOTAL   TROPONIN I    Narrative:     ADD ON CARBA AND TROP PER: GELACIO GUZMAN MD  10/02/2018  10:08   ADD ON ALC PER: GELACIO GUZMAN MD  10/02/2018  10:15    ALCOHOL,MEDICAL (ETHANOL)    Narrative:     ADD ON CARBA AND TROP PER: GELACIO GUZMAN MD  10/02/2018  10:08   ADD ON ALC PER: GELACIO GUZMAN MD  10/02/2018  10:15    URINALYSIS MICROSCOPIC    Narrative:     Preferred Collection Type->Urine, Clean Catch   LACTIC ACID, PLASMA   POCT GLUCOSE   POCT GLUCOSE   ISTAT PROCEDURE   POCT GLUCOSE     EKG Readings: (Independently Interpreted)   Initial Reading: No STEMI. Rhythm: Sinus Tachycardia.   ST depression in the inferior leads, no old EKG for comparison        Imaging Results          MRI Brain Without Contrast (Final result)  Result time 10/02/18 15:18:41    Final result by José Luis Zhao MD (10/02/18 15:18:41)                 Impression:      No evidence of acute infarct or hemorrhage.    Nonspecific supratentorial leukoencephalopathy as above.  Favor chronic microvascular ischemic changes that are  advanced for age, but clinical correlation is required.      Electronically signed by: José Luis Zhao MD  Date:    10/02/2018  Time:    15:18             Narrative:    EXAMINATION:  MRI BRAIN WITHOUT CONTRAST    CLINICAL HISTORY:  ataxia;Ataxia, unspecified    TECHNIQUE:  Multiplanar multisequence MR imaging of the brain was performed without intravenous contrast.    COMPARISON:  Head CT 10/02/2018    FINDINGS:  Intracranial Compartment:    Ventricles are normal in size for age without evidence of hydrocephalus.    No abnormal restricted diffusion to suggest an acute infarction on today's examination.  However, there are multiple punctate foci of T2/FLAIR hyperintensity scattered throughout the supratentorial white matter.  Lesions primarily involve the subcortical and deep white matter of the frontal lobes.  No parenchymal mass or hemorrhage.  Recent or remote major vascular distribution infarct.  In    No extra-axial blood or fluid collections.    Normal vascular flow voids are preserved.    Skull/Extracranial Contents (limited evaluation):    Bone marrow signal intensity is normal.  Mucous retention cysts in the right maxillary sinus.                               CT Head Without Contrast (Final result)  Result time 10/02/18 12:09:37    Final result by José Luis Zhao MD (10/02/18 12:09:37)                 Impression:      No evidence of acute intracranial pathology    Electronically signed by resident: Gautam Flores MD  Date:    10/02/2018  Time:    10:33    Electronically signed by: José Luis Zhao MD  Date:    10/02/2018  Time:    12:09             Narrative:    EXAMINATION:  CT HEAD WITHOUT CONTRAST    CLINICAL HISTORY:  Dizziness;    TECHNIQUE:  Low dose axial CT images obtained throughout the head without intravenous contrast. Sagittal and coronal reconstructions were performed.    COMPARISON:  None.    FINDINGS:  Intracranial compartment:    Ventricles and sulci are normal in size for age without evidence  of hydrocephalus.    No extra-axial blood or fluid collections.    The brain parenchyma appears within normal limits.  No parenchymal mass, hemorrhage, edema or major vascular distribution infarct.    Skull/extracranial contents (limited evaluation):    No fracture. Mastoid air cells and paranasal sinuses are essentially clear.                                 Medical Decision Making:   History:   Old Medical Records: I decided to obtain old medical records.  Initial Assessment:   37 yo m, here with generalized weakness/dizziness, difficulty ambulating, near-syncope episodes.  On exam, pt frail, chronically-ill appearing.  Orthostatics significantly positive.    Neuro exam with no focal weakness, no sensory deficit, speech fluent, aaox3, but unable to ambulate - unclear though if this is 2/2 severe hypotension with standing.      Differential Diagnosis:     Unclear if pt dehydrated, septic, having subacute stroke (VAN negative, last seen normal around 12 midnight last night so out of TPA window), electrolyte disturbance, MI or tox issue like carbamazepine toxicity.  Did take excessive dose of insulin this am though no recorded hypoglycemic episodes despite numerous FS checks here.    Clinical Tests:   Lab Tests: Ordered and Reviewed  Radiological Study: Ordered and Reviewed  Medical Tests: Ordered and Reviewed  ED Management:  Labs  Lactate  Blood cultures  Carbamazepine and ethanol levels  CT head (may need MRI brain if negative and sx persist)  IVF  Needs ED bed    10:54 AM  Lactate 3.2, receiving IVF, BP improving  Labs o/w unremarkable  Caregivers at the bedside report that pt with similar issues of weakness/hypotension over the past several weeks so PCP at [facility] has been adjusting BP meds, recently removed amlodipine 2/2 hypotension    12:32 PM  BPs now normal s/p 2 L NS  Reassessed pt's gait - much less ataxic than on initial assessment but still needs assistance to walk, somewhat off balance which is not normal  for him   Tegretol level still pending as is repeat lactate  Will admit to medicine - if tegretol level normal, will likely need MRI brain     2:17 PM  Tegretol low  MRI brain ordered and will have internal medicine f/u results  Low suspicion for acute posterior circulation stroke and gait improving with BP improvement/hydration but not completely back to baseline so will get test            Scribe Attestation:   Scribe #1: I performed the above scribed service and the documentation accurately describes the services I performed. I attest to the accuracy of the note.    I, Dr. Megan Asencio, personally performed the services described in this documentation. All medical record entries made by the scribe were at my direction and in my presence.  I have reviewed the chart and agree that the record reflects my personal performance and is accurate and complete. Megan sAencio MD.  10:22 AM 10/09/2018               Clinical Impression:   The primary encounter diagnosis was Dehydration. A diagnosis of Ataxia was also pertinent to this visit.      Disposition:   Disposition: Admitted  Condition: Stable                        Megan Asencio MD  10/09/18 1232

## 2018-10-02 NOTE — PLAN OF CARE
Problem: Patient Care Overview  Goal: Plan of Care Review  Outcome: Ongoing (interventions implemented as appropriate)  Plan of care discussed with pt. Pt verbalizes understanding.

## 2018-10-03 VITALS
RESPIRATION RATE: 18 BRPM | DIASTOLIC BLOOD PRESSURE: 86 MMHG | TEMPERATURE: 98 F | HEIGHT: 69 IN | WEIGHT: 128 LBS | BODY MASS INDEX: 18.96 KG/M2 | HEART RATE: 87 BPM | OXYGEN SATURATION: 100 % | SYSTOLIC BLOOD PRESSURE: 132 MMHG

## 2018-10-03 PROBLEM — E43 SEVERE MALNUTRITION: Status: ACTIVE | Noted: 2018-10-03

## 2018-10-03 LAB
ALBUMIN SERPL BCP-MCNC: 3.2 G/DL
ALP SERPL-CCNC: 120 U/L
ALT SERPL W/O P-5'-P-CCNC: 12 U/L
ANION GAP SERPL CALC-SCNC: 11 MMOL/L
AST SERPL-CCNC: 12 U/L
BASOPHILS # BLD AUTO: 0.07 K/UL
BASOPHILS NFR BLD: 0.7 %
BILIRUB SERPL-MCNC: 0.2 MG/DL
BUN SERPL-MCNC: 14 MG/DL
CALCIUM SERPL-MCNC: 9.6 MG/DL
CHLORIDE SERPL-SCNC: 103 MMOL/L
CO2 SERPL-SCNC: 23 MMOL/L
CREAT SERPL-MCNC: 1 MG/DL
DIFFERENTIAL METHOD: ABNORMAL
EOSINOPHIL # BLD AUTO: 0.2 K/UL
EOSINOPHIL NFR BLD: 1.9 %
ERYTHROCYTE [DISTWIDTH] IN BLOOD BY AUTOMATED COUNT: 12.8 %
EST. GFR  (AFRICAN AMERICAN): >60 ML/MIN/1.73 M^2
EST. GFR  (NON AFRICAN AMERICAN): >60 ML/MIN/1.73 M^2
ESTIMATED AVG GLUCOSE: ABNORMAL MG/DL
GLUCOSE SERPL-MCNC: 260 MG/DL
HBA1C MFR BLD HPLC: >14 %
HCT VFR BLD AUTO: 37.6 %
HGB BLD-MCNC: 12.1 G/DL
IMM GRANULOCYTES # BLD AUTO: 0.03 K/UL
IMM GRANULOCYTES NFR BLD AUTO: 0.3 %
LYMPHOCYTES # BLD AUTO: 1.7 K/UL
LYMPHOCYTES NFR BLD: 16.7 %
MCH RBC QN AUTO: 26.1 PG
MCHC RBC AUTO-ENTMCNC: 32.2 G/DL
MCV RBC AUTO: 81 FL
MONOCYTES # BLD AUTO: 0.8 K/UL
MONOCYTES NFR BLD: 8.2 %
NEUTROPHILS # BLD AUTO: 7.1 K/UL
NEUTROPHILS NFR BLD: 72.2 %
NRBC BLD-RTO: 0 /100 WBC
PLATELET # BLD AUTO: 334 K/UL
PMV BLD AUTO: 11.8 FL
POCT GLUCOSE: 254 MG/DL (ref 70–110)
POCT GLUCOSE: 267 MG/DL (ref 70–110)
POTASSIUM SERPL-SCNC: 4.1 MMOL/L
PROT SERPL-MCNC: 7 G/DL
RBC # BLD AUTO: 4.64 M/UL
SODIUM SERPL-SCNC: 137 MMOL/L
WBC # BLD AUTO: 9.88 K/UL

## 2018-10-03 PROCEDURE — G0378 HOSPITAL OBSERVATION PER HR: HCPCS

## 2018-10-03 PROCEDURE — 63600175 PHARM REV CODE 636 W HCPCS: Performed by: INTERNAL MEDICINE

## 2018-10-03 PROCEDURE — 85025 COMPLETE CBC W/AUTO DIFF WBC: CPT

## 2018-10-03 PROCEDURE — 99217 PR OBSERVATION CARE DISCHARGE: CPT | Mod: ,,, | Performed by: INTERNAL MEDICINE

## 2018-10-03 PROCEDURE — 82962 GLUCOSE BLOOD TEST: CPT

## 2018-10-03 PROCEDURE — A4216 STERILE WATER/SALINE, 10 ML: HCPCS | Performed by: INTERNAL MEDICINE

## 2018-10-03 PROCEDURE — 80053 COMPREHEN METABOLIC PANEL: CPT

## 2018-10-03 PROCEDURE — 36415 COLL VENOUS BLD VENIPUNCTURE: CPT

## 2018-10-03 PROCEDURE — 25000003 PHARM REV CODE 250: Performed by: INTERNAL MEDICINE

## 2018-10-03 RX ORDER — AMLODIPINE BESYLATE 2.5 MG/1
1 TABLET ORAL DAILY
Refills: 1 | Status: ON HOLD | COMMUNITY
Start: 2018-08-15 | End: 2018-10-30

## 2018-10-03 RX ORDER — INSULIN GLARGINE 100 [IU]/ML
48 INJECTION, SOLUTION SUBCUTANEOUS NIGHTLY
Refills: 1 | Status: ON HOLD | COMMUNITY
Start: 2018-09-25 | End: 2019-02-01 | Stop reason: SDUPTHER

## 2018-10-03 RX ORDER — INSULIN ASPART 100 [IU]/ML
4 INJECTION, SOLUTION INTRAVENOUS; SUBCUTANEOUS
Qty: 3.6 ML | Refills: 2 | Status: SHIPPED | OUTPATIENT
Start: 2018-10-03 | End: 2018-10-03

## 2018-10-03 RX ORDER — OLMESARTAN MEDOXOMIL 20 MG/1
20 TABLET ORAL DAILY
Qty: 30 TABLET | Refills: 2 | Status: ON HOLD | OUTPATIENT
Start: 2018-10-03 | End: 2018-10-30 | Stop reason: SDUPTHER

## 2018-10-03 RX ORDER — INSULIN ASPART 100 [IU]/ML
15 INJECTION, SOLUTION INTRAVENOUS; SUBCUTANEOUS
Status: ON HOLD | COMMUNITY
Start: 2018-07-18 | End: 2019-02-01

## 2018-10-03 RX ORDER — TIMOLOL MALEATE 5 MG/ML
SOLUTION/ DROPS OPHTHALMIC
Refills: 2 | Status: ON HOLD | COMMUNITY
Start: 2018-07-13 | End: 2019-01-31

## 2018-10-03 RX ORDER — VENLAFAXINE HYDROCHLORIDE 75 MG/1
75 CAPSULE, EXTENDED RELEASE ORAL DAILY
Status: DISCONTINUED | OUTPATIENT
Start: 2018-10-03 | End: 2018-10-03 | Stop reason: HOSPADM

## 2018-10-03 RX ORDER — GLIMEPIRIDE 4 MG/1
1 TABLET ORAL DAILY
Refills: 0 | Status: ON HOLD | COMMUNITY
Start: 2018-08-15 | End: 2019-02-01 | Stop reason: HOSPADM

## 2018-10-03 RX ORDER — INSULIN ASPART 100 [IU]/ML
4 INJECTION, SOLUTION INTRAVENOUS; SUBCUTANEOUS
Status: DISCONTINUED | OUTPATIENT
Start: 2018-10-03 | End: 2018-10-03 | Stop reason: HOSPADM

## 2018-10-03 RX ORDER — VENLAFAXINE HYDROCHLORIDE 75 MG/1
1 CAPSULE, EXTENDED RELEASE ORAL DAILY
Refills: 5 | COMMUNITY
Start: 2018-08-15

## 2018-10-03 RX ORDER — OLMESARTAN MEDOXOMIL 40 MG/1
1 TABLET ORAL DAILY
Refills: 0 | Status: ON HOLD | COMMUNITY
Start: 2018-08-15 | End: 2018-10-03 | Stop reason: SDUPTHER

## 2018-10-03 RX ORDER — CARBAMAZEPINE 200 MG/1
400 TABLET ORAL 2 TIMES DAILY
Refills: 5 | COMMUNITY
Start: 2018-09-10

## 2018-10-03 RX ORDER — PEN NEEDLE, DIABETIC 31 GX5/16"
1 NEEDLE, DISPOSABLE MISCELLANEOUS 4 TIMES DAILY
Refills: 6 | COMMUNITY
Start: 2018-09-13

## 2018-10-03 RX ORDER — CARBAMAZEPINE 200 MG/1
200 TABLET, EXTENDED RELEASE ORAL 2 TIMES DAILY
Qty: 60 TABLET | Refills: 2 | Status: SHIPPED | OUTPATIENT
Start: 2018-10-03 | End: 2018-10-03 | Stop reason: HOSPADM

## 2018-10-03 RX ORDER — ASPIRIN 81 MG/1
1 TABLET ORAL DAILY
Refills: 3 | COMMUNITY
Start: 2018-08-15 | End: 2019-08-17

## 2018-10-03 RX ORDER — ASPIRIN 81 MG/1
81 TABLET ORAL DAILY
Status: DISCONTINUED | OUTPATIENT
Start: 2018-10-03 | End: 2018-10-03 | Stop reason: HOSPADM

## 2018-10-03 RX ADMIN — ATORVASTATIN CALCIUM 80 MG: 20 TABLET, FILM COATED ORAL at 09:10

## 2018-10-03 RX ADMIN — Medication 5 ML: at 06:10

## 2018-10-03 RX ADMIN — INSULIN ASPART 3 UNITS: 100 INJECTION, SOLUTION INTRAVENOUS; SUBCUTANEOUS at 09:10

## 2018-10-03 RX ADMIN — CARBAMAZEPINE 200 MG: 200 TABLET, EXTENDED RELEASE ORAL at 09:10

## 2018-10-03 RX ADMIN — INSULIN DETEMIR 15 UNITS: 100 INJECTION, SOLUTION SUBCUTANEOUS at 09:10

## 2018-10-03 NOTE — PLAN OF CARE
Plan is to discharge home with his facilitator, Avril.       10/03/18 1424   Final Note   Assessment Type Final Discharge Note   Discharge Disposition Home   Right Care Referral Info   Post Acute Recommendation No Care

## 2018-10-03 NOTE — PLAN OF CARE
Problem: Patient Care Overview  Goal: Plan of Care Review  Outcome: Ongoing (interventions implemented as appropriate)  Patient AAOx4 and VSS throughout the night. Q2H rounding and white board updating maintained. Call bell within reach. Patient had no complaints of pain throughout the night. Blood glucose monitoring maintained -- 1 unit of insulin administered before bedtime. Patient ambulated to bathroom twice. No skin breakdown noted. Will continue to monitor and continue plan of care.

## 2018-10-03 NOTE — PROGRESS NOTES
" [facility]  Adult Nutrition  Progress Note    SUMMARY       Recommendations    Recommendation/Intervention: 1. Continue Diabetic diet. 2. Provide Boost Glucose Control TID. 3. Dietary to provide double portion protein.  Goals: 1. Pt's blood glucose 140-180mg/dl. 2. Pt to consume >75% meals, supplement.    Reason for Assessment    Reason for Assessment: consult  Diagnosis: other (see comments)(dehydration)  Relevant Medical History: IDDM, DKA, seizures, HTN, HLD, anxiety, depression  General Information Comments: Pt with uncontrolled DM (A1c>14%). Per pt, he lives alone but has daily help cooking meals and reminding him to take his insulin through [employer] due to his disability; pt appears unable to fully take care of himself. Pt noted to have A1c 20% at [facility] [place]. Per pt, he is compliant with insulin regimen (possible poor historian), but he also admits to uncontrolled snacking at times after dinner as he gets very hungry and can't seem to gain weight. Pt works during the day, so he may not be taking his insulin while at work. Pt also has multiple unhealed wounds on his legs and arms which he says are bug bites that he scratched so much they became open wounds and would not heal. Per chart, pt experienced 12 lb (8.6%) severe wt loss since May 2018. Pt appears severely malnourished with severe muscle wasting of clavicle, deltoid area, legs. severe fat wasting of triceps and prominent ribs, scapula.  Nutrition Discharge Planning: Pt to have adequate po intake to meet nutrition needs; provided pt with diabetes nutrition education.    Nutrition Risk Screen    Nutrition Risk Screen: no indicators present    Nutrition/Diet History    Patient Reported Diet/Restrictions/Preferences: general  Do you have any cultural, spiritual, Judaism conflicts, given your current situation?: none    Anthropometrics    Temp: 98.1 °F (36.7 °C)  Height Method: Stated  Height: 5' 9" (175.3 " cm)  Height (inches): 69 in  Weight Method: Standard Scale  Weight: 58.1 kg (128 lb)  Weight (lb): 128 lb  Ideal Body Weight (IBW), Male: 160 lb  % Ideal Body Weight, Male (lb): 80 lb  BMI (Calculated): 18.9       Lab/Procedures/Meds    Pertinent Labs Reviewed: reviewed  Pertinent Labs Comments: A1c >14%, POCT glu 254, glu 260, Alb 3.2  Pertinent Medications Reviewed: reviewed  Pertinent Medications Comments: statin, aspart/detemir    Physical Findings/Assessment    Overall Physical Appearance: loss of muscle mass, loss of subcutaneous fat, underweight  Oral/Mouth Cavity: poor dentition  Skin: non-healing wound(s)    Estimated/Assessed Needs    Weight Used For Calorie Calculations: 58.1 kg (128 lb 1.4 oz)  Energy Calorie Requirements (kcal): 1870  Energy Need Method: Thomas-St Jeor(PAL 1.25)  Protein Requirements: 70-82  Weight Used For Protein Calculations: 58.1 kg (128 lb 1.4 oz)  RDA Method (mL): 1870     Nutrition Prescription Ordered    Current Diet Order: Diabetic 2000kcal    Evaluation of Received Nutrient/Fluid Intake    % Intake of Estimated Energy Needs: 50 - 75 %  % Meal Intake: 50 - 75 %    Nutrition Risk    Level of Risk/Frequency of Follow-up: high     Assessment and Plan  Severe Malnutrition in the context of Chronic Illness    Related to (etiology):  Uncontrolled diabetes    Signs and Symptoms (as evidenced by):  Severe muscle wasting of clavicle, deltoid area, legs; severe fat wasting of triceps and prominent ribs, scapula; pt with 8.6% severe wt loss x 4 months.    Nutrition Diagnosis Status:  New     Monitor and Evaluation    Food and Nutrient Intake: energy intake, food and beverage intake  Food and Nutrient Adminstration: diet order  Knowledge/Beliefs/Attitudes: food and nutrition knowledge/skill  Physical Activity and Function: factors affecting access to physical activity  Anthropometric Measurements: weight, weight change, body mass index  Biochemical Data, Medical Tests and Procedures:  electrolyte and renal panel, gastrointestinal profile, glucose/endocrine profile, lipid profile, inflammatory profile     Nutrition Follow-Up    RD Follow-up?: Yes

## 2018-10-03 NOTE — NURSING
AM assessment completed. Patient awake and alert. No distress noted at this time. No Pain. Patient watching TV and resting. call light within reach. Will continue to monitor.

## 2018-10-03 NOTE — NURSING
Discharge Instructions giving to patient. Mr. Field verbalized understanding and instructed to give paperwork to Mrs. Mckeon his . MAGALI Robison notified Mrs. Mckeon via phone on patient's discharge. Patient alert and oriented x 4. IV removed, tip intact, dressing applied. Waiting on ride.

## 2018-10-03 NOTE — PLAN OF CARE
Problem: Patient Care Overview  Goal: Plan of Care Review  Assessment and Plan  Severe Malnutrition in the context of Chronic Illness     Related to (etiology):  Uncontrolled diabetes     Signs and Symptoms (as evidenced by):  Severe muscle wasting of clavicle, deltoid area, legs; severe fat wasting of triceps and prominent ribs, scapula; pt with 8.6% severe wt loss x 4 months.    Recommendation/Intervention: 1. Continue Diabetic diet. 2. Provide Boost Glucose Control TID. 3. Dietary to provide double portion protein.  Goals: 1. Pt's blood glucose 140-180mg/dl. 2. Pt to consume >75% meals, supplement.

## 2018-10-03 NOTE — PLAN OF CARE
CM met with patient for discharge planning.  Patient states he has a patient care attendant through the [employer] that stays with him at night and she brings him to his job in the morning.  His Facilitator, Avril, will be his ride home.  Plan is to discharge home with facilitator.    Pharmacy:  [facility] [address] [facility] [address]  [address]  [facility] [address]  Phone: [phone] Fax: [phone]    PCP:  Dr. Madrigal, at [facility] [address]     Payor: MEDICARE / Plan: MEDICARE PART A & B / Product Type: [facility] /      10/03/18 0930   Discharge Assessment   Assessment Type Discharge Planning Assessment   Confirmed/corrected address and phone number on facesheet? Yes   Assessment information obtained from? Patient   Expected Length of Stay (days) 1   Communicated expected length of stay with patient/caregiver yes   Prior to hospitilization cognitive status: Alert/Oriented   Prior to hospitalization functional status: Independent   Current cognitive status: Alert/Oriented   Current Functional Status: Independent   Facility Arrived From: Emergency room   Lives With alone  (Has patient care attendant at night)   Able to Return to Prior Arrangements yes   Is patient able to care for self after discharge? Yes   Who are your caregiver(s) and their phone number(s)? Malorie Field - mother [phone]   Patient's perception of discharge disposition home or selfcare   Readmission Within The Last 30 Days no previous admission in last 30 days   Patient currently being followed by outpatient case management? No   Patient currently receives any other outside agency services? No   Equipment Currently Used at Home none   Do you have any problems affording any of your prescribed medications? No   Is the patient taking medications as prescribed? yes   Does the patient have transportation home? Yes    Transportation Available family or friend will provide   Does the patient receive services at the [facility]? No   Discharge Plan A Home   Discharge Plan B Home Health   Patient/Family In Agreement With Plan yes

## 2018-10-07 LAB
BACTERIA BLD CULT: NORMAL
BACTERIA BLD CULT: NORMAL

## 2018-10-22 NOTE — DISCHARGE SUMMARY
"[facility] Hospital Medicine  Discharge Summary      Patient Name: Pavel Field  MRN: [id number]  Admission Date: 10/2/2018  Hospital Length of Stay: 0 days  Discharge Date and Time: 10/3/2018  1:50 PM  Attending Physician: Penelope Elena MD   Discharging Provider: Penelope Elena MD  Primary Care Provider: Primary Doctor [facility] Medicine Team: [facility]  Penelope Elena MD    HPI:   Mr. Field is a 38 year old man with T2DM (diagnosed age 23, A1c >20% at [facility] 7/2018), HTN, HLD, has a 24 hour caregiver, who presents with acute encephalopathy and hypotension.    He was well this morning, his blood sugar was noted to be elevated at 358 by his caregiver. He injects his own insulin, gave himself 40 units of aspart this morning. After injecting insulin, he was noted to be "stumbling and shaking," appeared unwell, with blurred vision and confusion.     In the Emergency Department, he was hypotensive with BP drop to 64/40, which resolved with 2L IV NS. His blood sugar was notable at 82, creatinine elevated to 1.5 (normal baseline). Lactate was initially 3.2, improved to 2.0 after fluids. Carbamazepine level was undetectable, alcohol negative, UA with glucosuria.     On my interview, he feels well. Recalls giving himself insulin this morning but doesn't think he had too much. No major complaints right now. He endorses a good appetite.     * No surgery found *      Hospital Course:   Mr. Field was admitted under observation to Hospital Medicine for management of acute encephalopathy due to hypotension and self-administration of supratherapeutic doses of insulin. He was hypotensive on arrival, with resolution of both his hemodynamic abnormalities and his encephalopathy after fluid resuscitation in the Emergency Department. Infectious workup including chest radiography, urinalysis, labs, were normal. He was noted to have an acute renal injury with creatinine 1.5, which resolved after fluids. "     He had accidentally self-administered 40 units of his short acting insulin instead of long acting. His admission labs were notable for relative hypoglycemia in the setting of an outpatient A1c of >20% (from [facility] weeks prior). He was asymptomatic throughout his stay and tolerated restarting his home insulin. He and his caregiver were counseled on insulin administration. He was discharged to home with his 24hr caregiver in good condition.     Consults:     No new Assessment & Plan notes have been filed under this hospital service since the last note was generated.  Service: Hospital Medicine    Final Active Diagnoses:    Diagnosis Date Noted POA    PRINCIPAL PROBLEM:  Dehydration [E86.0] 10/02/2018 Yes    Orthostatic hypotension [I95.1] 10/02/2018 Yes    Acute metabolic encephalopathy [G93.41] 10/02/2018 Yes    Type 2 diabetes mellitus with hyperglycemia, with long-term current use of insulin [E11.65, Z79.4] 10/02/2018 Not Applicable    Essential hypertension [I10] 12/16/2016 Yes    Seizure disorder [G40.909] 12/16/2016 Yes    Severe malnutrition [E43] 10/03/2018 Yes    BRYSON (acute kidney injury) [N17.9] 10/02/2018 Yes      Problems Resolved During this Admission:       Discharged Condition: good    Disposition: Home or Self Care    Follow Up:  Follow-up Information     [facility]. Schedule an appointment as soon as possible for a visit in 1 week.    Contact information:  [address] [place] [address] [phone]                 Patient Instructions:      Diet diabetic     Notify your health care provider if you experience any of the following:  persistent dizziness, light-headedness, or visual disturbances     Activity as tolerated         Significant Diagnostic Studies  Creatinine   Date Value Ref Range Status   10/03/2018 1.0 0.5 - 1.4 mg/dL Final   10/02/2018 1.5 (H) 0.5 - 1.4 mg/dL Final   05/18/2018 1.7 (H) 0.5 - 1.4 mg/dL Final     BMP  Lab Results   Component Value Date      10/03/2018    K 4.1 10/03/2018     10/03/2018    CO2 23 10/03/2018    BUN 14 10/03/2018    CREATININE 1.0 10/03/2018    CALCIUM 9.6 10/03/2018    ANIONGAP 11 10/03/2018    ESTGFRAFRICA >60.0 10/03/2018    EGFRNONAA >60.0 10/03/2018     Lab Results   Component Value Date    ALT 12 10/03/2018    AST 12 10/03/2018    ALKPHOS 120 10/03/2018    BILITOT 0.2 10/03/2018     Lab Results   Component Value Date    WBC 9.88 10/03/2018    HGB 12.1 (L) 10/03/2018    HCT 37.6 (L) 10/03/2018    MCV 81 (L) 10/03/2018     10/03/2018     Imaging Results          MRI Brain Without Contrast (Final result)  Result time 10/02/18 15:18:41    Final result by José Luis Zhao MD (10/02/18 15:18:41)                 Impression:      No evidence of acute infarct or hemorrhage.    Nonspecific supratentorial leukoencephalopathy as above.  Favor chronic microvascular ischemic changes that are advanced for age, but clinical correlation is required.      Electronically signed by: José Luis Zhao MD  Date:    10/02/2018  Time:    15:18             Narrative:    EXAMINATION:  MRI BRAIN WITHOUT CONTRAST    CLINICAL HISTORY:  ataxia;Ataxia, unspecified    TECHNIQUE:  Multiplanar multisequence MR imaging of the brain was performed without intravenous contrast.    COMPARISON:  Head CT 10/02/2018    FINDINGS:  Intracranial Compartment:    Ventricles are normal in size for age without evidence of hydrocephalus.    No abnormal restricted diffusion to suggest an acute infarction on today's examination.  However, there are multiple punctate foci of T2/FLAIR hyperintensity scattered throughout the supratentorial white matter.  Lesions primarily involve the subcortical and deep white matter of the frontal lobes.  No parenchymal mass or hemorrhage.  Recent or remote major vascular distribution infarct.  In    No extra-axial blood or fluid collections.    Normal vascular flow voids are preserved.    Skull/Extracranial Contents (limited  evaluation):    Bone marrow signal intensity is normal.  Mucous retention cysts in the right maxillary sinus.                               CT Head Without Contrast (Final result)  Result time 10/02/18 12:09:37    Final result by José Luis Zhao MD (10/02/18 12:09:37)                 Impression:      No evidence of acute intracranial pathology    Electronically signed by resident: Gautam Flores MD  Date:    10/02/2018  Time:    10:33    Electronically signed by: José Luis Zhao MD  Date:    10/02/2018  Time:    12:09             Narrative:    EXAMINATION:  CT HEAD WITHOUT CONTRAST    CLINICAL HISTORY:  Dizziness;    TECHNIQUE:  Low dose axial CT images obtained throughout the head without intravenous contrast. Sagittal and coronal reconstructions were performed.    COMPARISON:  None.    FINDINGS:  Intracranial compartment:    Ventricles and sulci are normal in size for age without evidence of hydrocephalus.    No extra-axial blood or fluid collections.    The brain parenchyma appears within normal limits.  No parenchymal mass, hemorrhage, edema or major vascular distribution infarct.    Skull/extracranial contents (limited evaluation):    No fracture. Mastoid air cells and paranasal sinuses are essentially clear.                                   Pending Diagnostic Studies:     None         Medications:  Reconciled Home Medications:      Medication List      CHANGE how you take these medications    carBAMazepine 200 mg tablet  Commonly known as:  TEGRETOL  Take 400 mg by mouth 2 (two) times daily.  What changed:  Another medication with the same name was removed. Continue taking this medication, and follow the directions you see here.     insulin aspart U-100 100 unit/mL Inpn pen  Commonly known as:  NovoLOG  Inject 10 Units into the skin 3 (three) times daily with meals.  What changed:  Another medication with the same name was removed. Continue taking this medication, and follow the directions you see here.    "  olmesartan 20 MG tablet  Commonly known as:  BENICAR  Take 1 tablet (20 mg total) by mouth once daily. Please contact primary care provider for refills.  What changed:    · medication strength  · how much to take  · additional instructions  · Another medication with the same name was removed. Continue taking this medication, and follow the directions you see here.        CONTINUE taking these medications    amLODIPine 2.5 MG tablet  Commonly known as:  NORVASC  Take 1 tablet by mouth once daily.     aspirin 81 MG EC tablet  Commonly known as:  ECOTRIN  Take 1 tablet by mouth once daily.     atorvastatin 80 MG tablet  Commonly known as:  LIPITOR  Take 80 mg by mouth once daily.     BASAGLAR KWIKPEN U-100 INSULIN 100 unit/mL (3 mL) Inpn pen  Generic drug:  insulin glargine  Inject 45 Units into the skin every evening.     BD ULTRA-FINE BRUNILDA PEN NEEDLE 32 gauge x 5/32" Ndle  Generic drug:  pen needle, diabetic  Inject 1 Units into the skin 4 (four) times daily.     glimepiride 4 MG tablet  Commonly known as:  AMARYL  Take 1 tablet by mouth once daily.     timolol maleate 0.5% 0.5 % Drop  Commonly known as:  TIMOPTIC  INSTILL ONE DROP IN OU BID     venlafaxine 75 MG 24 hr capsule  Commonly known as:  EFFEXOR-XR  Take 1 capsule by mouth once daily.        STOP taking these medications    insulin detemir U-100 100 unit/mL (3 mL) Inpn pen  Commonly known as:  LEVEMIR FLEXTOUCH            Indwelling Lines/Drains at time of discharge:   Lines/Drains/Airways          None          Time spent on the discharge of patient: <35 minutes  Patient was seen and examined on the date of discharge and determined to be suitable for discharge.       Penelope Elena M.D., M.P.H.  Department of Hospital Medicine  [facility]  (pager) [phone] (spect) [phone]      "

## 2018-10-22 NOTE — HOSPITAL COURSE
Mr. Field was admitted under observation to Hospital Medicine for management of acute encephalopathy due to hypotension and self-administration of supratherapeutic doses of insulin. He was hypotensive on arrival, with resolution of both his hemodynamic abnormalities and his encephalopathy after fluid resuscitation in the Emergency Department. Infectious workup including chest radiography, urinalysis, labs, were normal. He was noted to have an acute renal injury with creatinine 1.5, which resolved after fluids.     He had accidentally self-administered 40 units of his short acting insulin instead of long acting. His admission labs were notable for relative hypoglycemia in the setting of an outpatient A1c of >20% (from [facility] weeks prior). He was asymptomatic throughout his stay and tolerated restarting his home insulin. He and his caregiver were counseled on insulin administration. He was discharged to home with his 24hr caregiver in good condition.

## 2018-10-29 ENCOUNTER — HOSPITAL ENCOUNTER (OUTPATIENT)
Facility: HOSPITAL | Age: 38
Discharge: HOME HEALTH | End: 2018-10-30
Attending: EMERGENCY MEDICINE | Admitting: EMERGENCY MEDICINE
Payer: MEDICARE

## 2018-10-29 DIAGNOSIS — I10 ESSENTIAL HYPERTENSION: ICD-10-CM

## 2018-10-29 DIAGNOSIS — R65.10 SIRS (SYSTEMIC INFLAMMATORY RESPONSE SYNDROME): ICD-10-CM

## 2018-10-29 DIAGNOSIS — I95.9 HYPOTENSION, UNSPECIFIED HYPOTENSION TYPE: Primary | ICD-10-CM

## 2018-10-29 PROBLEM — F89 DEVELOPMENTAL DISABILITY: Status: ACTIVE | Noted: 2018-10-29

## 2018-10-29 LAB
ALBUMIN SERPL BCP-MCNC: 3 G/DL
ALP SERPL-CCNC: 131 U/L
ALT SERPL W/O P-5'-P-CCNC: 10 U/L
ANION GAP SERPL CALC-SCNC: 9 MMOL/L
AST SERPL-CCNC: 12 U/L
BACTERIA #/AREA URNS AUTO: ABNORMAL /HPF
BASOPHILS # BLD AUTO: 0.03 K/UL
BASOPHILS NFR BLD: 0.2 %
BILIRUB SERPL-MCNC: 0.2 MG/DL
BILIRUB UR QL STRIP: NEGATIVE
BUN SERPL-MCNC: 13 MG/DL
CALCIUM SERPL-MCNC: 10.1 MG/DL
CHLORIDE SERPL-SCNC: 97 MMOL/L
CLARITY UR REFRACT.AUTO: ABNORMAL
CO2 SERPL-SCNC: 30 MMOL/L
COLOR UR AUTO: YELLOW
CREAT SERPL-MCNC: 1.4 MG/DL
DIFFERENTIAL METHOD: ABNORMAL
EOSINOPHIL # BLD AUTO: 0.1 K/UL
EOSINOPHIL NFR BLD: 0.4 %
ERYTHROCYTE [DISTWIDTH] IN BLOOD BY AUTOMATED COUNT: 12.2 %
EST. GFR  (AFRICAN AMERICAN): >60 ML/MIN/1.73 M^2
EST. GFR  (NON AFRICAN AMERICAN): >60 ML/MIN/1.73 M^2
ESTIMATED AVG GLUCOSE: ABNORMAL MG/DL
GLUCOSE SERPL-MCNC: 126 MG/DL
GLUCOSE UR QL STRIP: ABNORMAL
HBA1C MFR BLD HPLC: >14 %
HCT VFR BLD AUTO: 36.9 %
HGB BLD-MCNC: 11.7 G/DL
HGB UR QL STRIP: NEGATIVE
HYALINE CASTS UR QL AUTO: 15 /LPF
IMM GRANULOCYTES # BLD AUTO: 0.05 K/UL
IMM GRANULOCYTES NFR BLD AUTO: 0.3 %
KETONES UR QL STRIP: NEGATIVE
LACTATE SERPL-SCNC: 2.2 MMOL/L
LACTATE SERPL-SCNC: 2.6 MMOL/L
LEUKOCYTE ESTERASE UR QL STRIP: NEGATIVE
LYMPHOCYTES # BLD AUTO: 1.2 K/UL
LYMPHOCYTES NFR BLD: 8.3 %
MCH RBC QN AUTO: 25.5 PG
MCHC RBC AUTO-ENTMCNC: 31.7 G/DL
MCV RBC AUTO: 81 FL
MICROSCOPIC COMMENT: ABNORMAL
MONOCYTES # BLD AUTO: 0.9 K/UL
MONOCYTES NFR BLD: 6.1 %
NEUTROPHILS # BLD AUTO: 12.3 K/UL
NEUTROPHILS NFR BLD: 84.7 %
NITRITE UR QL STRIP: NEGATIVE
NRBC BLD-RTO: 0 /100 WBC
PH UR STRIP: 5 [PH] (ref 5–8)
PLATELET # BLD AUTO: 457 K/UL
PMV BLD AUTO: 11.3 FL
POCT GLUCOSE: 115 MG/DL (ref 70–110)
POCT GLUCOSE: 120 MG/DL (ref 70–110)
POTASSIUM SERPL-SCNC: 3.7 MMOL/L
PROT SERPL-MCNC: 8.2 G/DL
PROT UR QL STRIP: ABNORMAL
RBC # BLD AUTO: 4.58 M/UL
RBC #/AREA URNS AUTO: 0 /HPF (ref 0–4)
SODIUM SERPL-SCNC: 136 MMOL/L
SP GR UR STRIP: 1.03 (ref 1–1.03)
SQUAMOUS #/AREA URNS AUTO: 2 /HPF
TSH SERPL DL<=0.005 MIU/L-ACNC: 2.41 UIU/ML
URN SPEC COLLECT METH UR: ABNORMAL
WBC # BLD AUTO: 14.51 K/UL
WBC #/AREA URNS AUTO: 2 /HPF (ref 0–5)
YEAST UR QL AUTO: ABNORMAL

## 2018-10-29 PROCEDURE — G0378 HOSPITAL OBSERVATION PER HR: HCPCS

## 2018-10-29 PROCEDURE — 83605 ASSAY OF LACTIC ACID: CPT | Mod: 91

## 2018-10-29 PROCEDURE — 81001 URINALYSIS AUTO W/SCOPE: CPT

## 2018-10-29 PROCEDURE — 25000003 PHARM REV CODE 250: Performed by: EMERGENCY MEDICINE

## 2018-10-29 PROCEDURE — 99220 PR INITIAL OBSERVATION CARE,LEVL III: CPT | Mod: ,,, | Performed by: INTERNAL MEDICINE

## 2018-10-29 PROCEDURE — 99285 EMERGENCY DEPT VISIT HI MDM: CPT | Mod: 25

## 2018-10-29 PROCEDURE — 80053 COMPREHEN METABOLIC PANEL: CPT

## 2018-10-29 PROCEDURE — 83036 HEMOGLOBIN GLYCOSYLATED A1C: CPT

## 2018-10-29 PROCEDURE — 99284 EMERGENCY DEPT VISIT MOD MDM: CPT | Mod: ,,, | Performed by: EMERGENCY MEDICINE

## 2018-10-29 PROCEDURE — 82962 GLUCOSE BLOOD TEST: CPT

## 2018-10-29 PROCEDURE — 25000003 PHARM REV CODE 250: Performed by: INTERNAL MEDICINE

## 2018-10-29 PROCEDURE — 96360 HYDRATION IV INFUSION INIT: CPT

## 2018-10-29 PROCEDURE — 63600175 PHARM REV CODE 636 W HCPCS: Performed by: INTERNAL MEDICINE

## 2018-10-29 PROCEDURE — 85025 COMPLETE CBC W/AUTO DIFF WBC: CPT

## 2018-10-29 PROCEDURE — 96361 HYDRATE IV INFUSION ADD-ON: CPT

## 2018-10-29 PROCEDURE — 87040 BLOOD CULTURE FOR BACTERIA: CPT | Mod: 59

## 2018-10-29 PROCEDURE — 83605 ASSAY OF LACTIC ACID: CPT

## 2018-10-29 PROCEDURE — 84443 ASSAY THYROID STIM HORMONE: CPT

## 2018-10-29 RX ORDER — IBUPROFEN 200 MG
24 TABLET ORAL
Status: DISCONTINUED | OUTPATIENT
Start: 2018-10-29 | End: 2018-10-30 | Stop reason: HOSPADM

## 2018-10-29 RX ORDER — IBUPROFEN 200 MG
16 TABLET ORAL
Status: DISCONTINUED | OUTPATIENT
Start: 2018-10-29 | End: 2018-10-30 | Stop reason: HOSPADM

## 2018-10-29 RX ORDER — ONDANSETRON 8 MG/1
8 TABLET, ORALLY DISINTEGRATING ORAL EVERY 8 HOURS PRN
Status: DISCONTINUED | OUTPATIENT
Start: 2018-10-29 | End: 2018-10-30 | Stop reason: HOSPADM

## 2018-10-29 RX ORDER — VENLAFAXINE HYDROCHLORIDE 75 MG/1
75 CAPSULE, EXTENDED RELEASE ORAL DAILY
Status: DISCONTINUED | OUTPATIENT
Start: 2018-10-30 | End: 2018-10-30 | Stop reason: HOSPADM

## 2018-10-29 RX ORDER — ATORVASTATIN CALCIUM 20 MG/1
80 TABLET, FILM COATED ORAL DAILY
Status: DISCONTINUED | OUTPATIENT
Start: 2018-10-30 | End: 2018-10-30 | Stop reason: HOSPADM

## 2018-10-29 RX ORDER — GLUCAGON 1 MG
1 KIT INJECTION
Status: DISCONTINUED | OUTPATIENT
Start: 2018-10-29 | End: 2018-10-30 | Stop reason: HOSPADM

## 2018-10-29 RX ORDER — PROMETHAZINE HYDROCHLORIDE 12.5 MG/1
25 TABLET ORAL EVERY 6 HOURS PRN
Status: DISCONTINUED | OUTPATIENT
Start: 2018-10-29 | End: 2018-10-30 | Stop reason: HOSPADM

## 2018-10-29 RX ORDER — INSULIN ASPART 100 [IU]/ML
0-5 INJECTION, SOLUTION INTRAVENOUS; SUBCUTANEOUS
Status: DISCONTINUED | OUTPATIENT
Start: 2018-10-29 | End: 2018-10-30 | Stop reason: HOSPADM

## 2018-10-29 RX ORDER — ACETAMINOPHEN 325 MG/1
650 TABLET ORAL EVERY 4 HOURS PRN
Status: DISCONTINUED | OUTPATIENT
Start: 2018-10-29 | End: 2018-10-30 | Stop reason: HOSPADM

## 2018-10-29 RX ORDER — SODIUM CHLORIDE 0.9 % (FLUSH) 0.9 %
5 SYRINGE (ML) INJECTION
Status: DISCONTINUED | OUTPATIENT
Start: 2018-10-29 | End: 2018-10-30 | Stop reason: HOSPADM

## 2018-10-29 RX ORDER — SODIUM CHLORIDE 9 MG/ML
INJECTION, SOLUTION INTRAVENOUS CONTINUOUS
Status: DISCONTINUED | OUTPATIENT
Start: 2018-10-29 | End: 2018-10-30

## 2018-10-29 RX ORDER — ASPIRIN 81 MG/1
81 TABLET ORAL DAILY
Status: DISCONTINUED | OUTPATIENT
Start: 2018-10-30 | End: 2018-10-30 | Stop reason: HOSPADM

## 2018-10-29 RX ORDER — CARBAMAZEPINE 200 MG/1
400 TABLET ORAL 2 TIMES DAILY
Status: DISCONTINUED | OUTPATIENT
Start: 2018-10-29 | End: 2018-10-30 | Stop reason: HOSPADM

## 2018-10-29 RX ORDER — TIMOLOL MALEATE 5 MG/ML
1 SOLUTION/ DROPS OPHTHALMIC 2 TIMES DAILY
Status: DISCONTINUED | OUTPATIENT
Start: 2018-10-29 | End: 2018-10-30 | Stop reason: HOSPADM

## 2018-10-29 RX ADMIN — SODIUM CHLORIDE 1000 ML: 0.9 INJECTION, SOLUTION INTRAVENOUS at 12:10

## 2018-10-29 RX ADMIN — SODIUM CHLORIDE 1000 ML: 0.9 INJECTION, SOLUTION INTRAVENOUS at 02:10

## 2018-10-29 RX ADMIN — CARBAMAZEPINE 400 MG: 200 TABLET ORAL at 08:10

## 2018-10-29 RX ADMIN — INSULIN DETEMIR 45 UNITS: 100 INJECTION, SOLUTION SUBCUTANEOUS at 08:10

## 2018-10-29 RX ADMIN — SODIUM CHLORIDE: 0.9 INJECTION, SOLUTION INTRAVENOUS at 04:10

## 2018-10-29 NOTE — ED PROVIDER NOTES
Encounter Date: 10/29/2018    SCRIBE #1 NOTE: I, Lila Palomares, am scribing for, and in the presence of,  Dr. Asencio. I have scribed the entire note.       History     Chief Complaint   Patient presents with    Hypotension     Pt sent from [facility] clinic for low blood pressure.  EMS states pt took double dose of his Benicar 20mg.     Time patient was seen by the provider: 12:17 PM      The patient is a 38 y.o. male with co-morbidities including: anxiety, depression, DM, DKA, HLD, HTN, and seizures who presents to the ED via EMS with a complaint of hypotension for a few hours. Caregiver states that the patient's Benicar dosage was decreased from 20 mg to 10 mg four days ago. However, the patient has been accidentally taking 20 mg. He notes that he did not eat much this morning, and took his BP medications shortly afterward. Pt was later noted to have some gait unsteadiness and was sent to the [facility] where he was noted to have a low BP and sent to the ED for further evaluation and management. Pt states that he received IV fluids via EMS and denies any complaints at this time.       The history is provided by the patient, medical records and a caregiver.     Review of patient's allergies indicates:  No Known Allergies  Past Medical History:   Diagnosis Date    Anxiety     Depression     Diabetes mellitus     DKA (diabetic ketoacidoses)     Glaucoma     Hyperlipemia     Hypertension     Seizures      History reviewed. No pertinent surgical history.  History reviewed. No pertinent family history.  Social History     Tobacco Use    Smoking status: Never Smoker    Smokeless tobacco: Never Used   Substance Use Topics    Alcohol use: No    Drug use: No     Review of Systems   Constitutional: Negative for fever.   HENT: Negative for nosebleeds.    Eyes: Negative for visual disturbance.   Respiratory: Negative for wheezing.    Cardiovascular: Negative for chest pain.   Gastrointestinal:  Negative for abdominal pain.   Musculoskeletal: Positive for gait problem.   Skin: Negative for wound.   Neurological: Negative for speech difficulty.   Psychiatric/Behavioral: Negative for confusion.       Physical Exam     Initial Vitals [10/29/18 1125]   BP Pulse Resp Temp SpO2   110/64 81 16 97.7 °F (36.5 °C) 99 %      MAP       --         Physical Exam    Nursing note and vitals reviewed.  Constitutional:   Chronically ill. Frail.    HENT:   Head: Normocephalic and atraumatic.   Mouth/Throat: Mucous membranes are dry.   Neck: Neck supple.   Cardiovascular: Normal rate, regular rhythm and normal heart sounds.   Pulmonary/Chest: Breath sounds normal. No respiratory distress. He has no wheezes. He has no rhonchi. He has no rales.   Abdominal: Soft. He exhibits no distension. There is no tenderness.   Musculoskeletal: He exhibits no edema.   Neurological: He is alert and oriented to person, place, and time. He has normal strength. No cranial nerve deficit or sensory deficit.   Answering questions appropriately. No focal weaknesses.    Skin: No rash noted.         ED Course   Procedures  Labs Reviewed   COMPREHENSIVE METABOLIC PANEL - Abnormal; Notable for the following components:       Result Value    CO2 30 (*)     Glucose 126 (*)     Albumin 3.0 (*)     All other components within normal limits   CBC W/ AUTO DIFFERENTIAL - Abnormal; Notable for the following components:    WBC 14.51 (*)     RBC 4.58 (*)     Hemoglobin 11.7 (*)     Hematocrit 36.9 (*)     MCV 81 (*)     MCH 25.5 (*)     MCHC 31.7 (*)     Platelets 457 (*)     Gran # (ANC) 12.3 (*)     Immature Grans (Abs) 0.05 (*)     Gran% 84.7 (*)     Lymph% 8.3 (*)     All other components within normal limits   LACTIC ACID, PLASMA - Abnormal; Notable for the following components:    Lactate (Lactic Acid) 2.6 (*)     All other components within normal limits   URINALYSIS, REFLEX TO URINE CULTURE - Abnormal; Notable for the following components:    Appearance,  UA Cloudy (*)     Protein, UA 2+ (*)     Glucose, UA 3+ (*)     All other components within normal limits    Narrative:     Preferred Collection Type->Urine, Clean Catch  orange top cup   URINALYSIS MICROSCOPIC - Abnormal; Notable for the following components:    Bacteria, UA Few (*)     Hyaline Casts, UA 15 (*)     All other components within normal limits    Narrative:     Preferred Collection Type->Urine, Clean Catch  orange top cup   POCT GLUCOSE - Abnormal; Notable for the following components:    POCT Glucose 120 (*)     All other components within normal limits   TSH   HEMOGLOBIN A1C          Imaging Results          X-Ray Chest AP Portable (Final result)  Result time 10/29/18 14:10:48    Final result by Brando Robins MD (10/29/18 14:10:48)                 Impression:      No significant intrathoracic abnormality.      Electronically signed by: Brando Robins MD  Date:    10/29/2018  Time:    14:10             Narrative:    EXAMINATION:  XR CHEST AP PORTABLE    CLINICAL HISTORY:  hypotension;    COMPARISON:  No relevant comparison examinations are currently available.    FINDINGS:  Heart size is normal, as is the appearance of the pulmonary vascularity.  Lung zones are clear, and free of significant airspace consolidation or volume loss.  No pleural fluid.  No abnormal mediastinal widening.  No pneumothorax.                                 Medical Decision Making:   History:   Old Medical Records: I decided to obtain old medical records.  Initial Assessment:   37 yo m, h/o mild MR, HTN, DM, 24 hour care, here 2/2 recurrent hypotension again today noted at day program  Pt seen by me 3 weeks ago with similar presentation  Caregiver reports pt seen by PCP last week - was supposed to cut benicar dose in half but pt has continued to take 20 mg daily instead of 10 mg   No recent infectious sx    Pt appears dry on exam, borderline BP  Differential Diagnosis:   Hypotension 2/2 medication vs dehydration vs infection vs  blood loss  Clinical Tests:   Lab Tests: Ordered and Reviewed  ED Management:  IVF  Labs  Reassess  Likely admit for obs    2:19 PM  Lactate elevated, leukocytosis  BP still borderline low  Low suspicion for infectious process (no infectious sx, no fever) so will hold off on antibiotics for now  Will admit to obs            Scribe Attestation:   Scribe #1: I performed the above scribed service and the documentation accurately describes the services I performed. I attest to the accuracy of the note.    I, Dr. Megan Asencio, personally performed the services described in this documentation. All medical record entries made by the scribe were at my direction and in my presence.  I have reviewed the chart and agree that the record reflects my personal performance and is accurate and complete. Megan Asnecio MD.  1:58 PM 11/02/2018           Clinical Impression:   The primary encounter diagnosis was Hypotension, unspecified hypotension type. Diagnoses of Essential hypertension and SIRS (systemic inflammatory response syndrome) were also pertinent to this visit.      Disposition:   Disposition: Placed in Observation                        Megan Asencio MD  11/02/18 0628

## 2018-10-29 NOTE — ED NOTES
Unable to take report, attempted to call report to the charge nurse and MAGALI Ramirez, notified house supervisor

## 2018-10-29 NOTE — H&P
Hospital Medicine  History and Physical Exam    Team: [facility] [place] [place] HOSP MED SOSA Jamison MD  Admit Date: 10/29/2018  Principal Problem:  SIRS (systemic inflammatory response syndrome)   Patient information was obtained from patient and ER records.   Primary care Physician: Primary Doctor No  Code status: Full Code    HPI:   Pavel Field is a 38 y.o. male with past medical history of hypertension, hyperlipidemia, seizure disorder, depression, developmental delay who presents with hypotension.  Patient was at [facility] clinic today and noted to have low blood pressure.  Patient was recently just discharged for similar episode on 10/22/18.  Patient apparently was supposed at be taking a decreased dose of his blood pressure medication but according to the notes he had been taking the full dose.  Patient has a history of developmental delay.  He has 24 hr care at home.  Patient notes that this morning he felt dizzy, he denies any lightheadedness or syncope.  He notes now this has resolved.  He notes he has been trying to drink more water recently.  He denies any chest pain or shortness of breath.  He denies any recent illness.  He denies any fevers or chills.  He does note some diarrhea over the last 24 hr.  He denies any blood in the stool.  He denies any nausea or vomiting.  He denies any cough.    In the ED, patient was noted to be hypotensive to 97/58.  He was given 2 L fluid bolus with improvement.  He was also found to have a leukocytosis of 14 as well as an elevated lactic acid at 2.6.  A chest x-ray was nonacute and urinalysis was also nonacute.  Admission was requested for symptomatic hypotension.                                                                                                        Of note, patient is a very poor historian and unable to get a very accurate history.                                                       Hemoglobin A1C   Date Value Ref Range Status   10/02/2018 >14.0 (H)  4.0 - 5.6 % Final     Comment:     ADA Screening Guidelines:  5.7-6.4%  Consistent with prediabetes  >or=6.5%  Consistent with diabetes  High levels of fetal hemoglobin interfere with the HbA1C  assay. Heterozygous hemoglobin variants (HbS, HgC, etc)do  not significantly interfere with this assay.   However, presence of multiple variants may affect accuracy.     12/16/2016 >16.9 (H) 4.5 - 6.2 % Final     Comment:     According to ADA guidelines, hemoglobin A1C <7.0% represents  optimal control in non-pregnant diabetic patients.  Different  metrics may apply to specific populations.   Standards of Medical Care in Diabetes - 2016.  For the purpose of screening for the presence of diabetes:  <5.7%     Consistent with the absence of diabetes  5.7-6.4%  Consistent with increasing risk for diabetes   (prediabetes)  >or=6.5%  Consistent with diabetes  Currently no consensus exists for use of hemoglobin A1C  for diagnosis of diabetes for children.         Past Medical History: Patient has a past medical history of Anxiety, Depression, Diabetes mellitus, DKA (diabetic ketoacidoses), Glaucoma, Hyperlipemia, Hypertension, and Seizures.    Past Surgical History: Patient has no past surgical history on file.    Social History: Patient reports that  has never smoked. he has never used smokeless tobacco. He reports that he does not drink alcohol or use drugs.    Family History: family history is not on file.    Medications: reviewed     Allergies: Patient has No Known Allergies.    ROS  Constitutional: Negative for chills, fatigue, fever.   HENT: Negative for sore throat, trouble swallowing.    Eyes: Negative for photophobia, visual disturbance.   Respiratory: Negative for cough, shortness of breath.    Cardiovascular: Negative for chest pain, palpitations, leg swelling.   Gastrointestinal: Negative for abdominal pain, constipation, nausea, vomiting. +diarrhea  Endocrine: Negative for cold intolerance, heat intolerance.    Genitourinary: Negative for dysuria, frequency.   Musculoskeletal: Negative for arthralgias, myalgias.   Skin: Negative for rash, wound, erythema   Neurological: Negative for dizziness, syncope, light-headedness. +weakness  Psychiatric/Behavioral: Negative for confusion, hallucinations, anxiety  All other systems reviewed and are negative.    PEx  Temp:  [97.7 °F (36.5 °C)-98 °F (36.7 °C)]   Pulse:  [76-98]   Resp:  [13-21]   BP: ()/(58-76)   SpO2:  [99 %-100 %]   Body mass index is 19.2 kg/m².     Intake/Output Summary (Last 24 hours) at 10/29/2018 1641  Last data filed at 10/29/2018 1604  Gross per 24 hour   Intake 2000 ml   Output --   Net 2000 ml         General appearance: no distress, alert and oriented x 3  HEENT:  conjunctivae/corneas clear, PERRL, mucous membranes dry  Neck: supple, thyroid not enlarged  Pulm:   normal respiratory effort, CTAB, no wheezes, rales or rhonchi  Card: RRR, S1, S2 normal, no murmur, click, rub or gallop  Abd: soft, NT, ND, BS present; no masses, no organomegaly  Ext: no c/c/e  Pulses: 2+, symmetric  Skin: color, texture, turgor normal. No rashes or lesions  Neuro: no focal numbness or weakness, normal strength and tone, follows all commands appropriately   Psych: developmental delay    Recent Results (from the past 24 hour(s))   POCT glucose    Collection Time: 10/29/18 12:35 PM   Result Value Ref Range    POCT Glucose 120 (H) 70 - 110 mg/dL   Comprehensive metabolic panel    Collection Time: 10/29/18 12:38 PM   Result Value Ref Range    Sodium 136 136 - 145 mmol/L    Potassium 3.7 3.5 - 5.1 mmol/L    Chloride 97 95 - 110 mmol/L    CO2 30 (H) 23 - 29 mmol/L    Glucose 126 (H) 70 - 110 mg/dL    BUN, Bld 13 6 - 20 mg/dL    Creatinine 1.4 0.5 - 1.4 mg/dL    Calcium 10.1 8.7 - 10.5 mg/dL    Total Protein 8.2 6.0 - 8.4 g/dL    Albumin 3.0 (L) 3.5 - 5.2 g/dL    Total Bilirubin 0.2 0.1 - 1.0 mg/dL    Alkaline Phosphatase 131 55 - 135 U/L    AST 12 10 - 40 U/L    ALT 10 10 - 44  U/L    Anion Gap 9 8 - 16 mmol/L    eGFR if African American >60.0 >60 mL/min/1.73 m^2    eGFR if non African American >60.0 >60 mL/min/1.73 m^2   CBC auto differential    Collection Time: 10/29/18 12:38 PM   Result Value Ref Range    WBC 14.51 (H) 3.90 - 12.70 K/uL    RBC 4.58 (L) 4.60 - 6.20 M/uL    Hemoglobin 11.7 (L) 14.0 - 18.0 g/dL    Hematocrit 36.9 (L) 40.0 - 54.0 %    MCV 81 (L) 82 - 98 fL    MCH 25.5 (L) 27.0 - 31.0 pg    MCHC 31.7 (L) 32.0 - 36.0 g/dL    RDW 12.2 11.5 - 14.5 %    Platelets 457 (H) 150 - 350 K/uL    MPV 11.3 9.2 - 12.9 fL    Immature Granulocytes 0.3 0.0 - 0.5 %    Gran # (ANC) 12.3 (H) 1.8 - 7.7 K/uL    Immature Grans (Abs) 0.05 (H) 0.00 - 0.04 K/uL    Lymph # 1.2 1.0 - 4.8 K/uL    Mono # 0.9 0.3 - 1.0 K/uL    Eos # 0.1 0.0 - 0.5 K/uL    Baso # 0.03 0.00 - 0.20 K/uL    nRBC 0 0 /100 WBC    Gran% 84.7 (H) 38.0 - 73.0 %    Lymph% 8.3 (L) 18.0 - 48.0 %    Mono% 6.1 4.0 - 15.0 %    Eosinophil% 0.4 0.0 - 8.0 %    Basophil% 0.2 0.0 - 1.9 %    Differential Method Automated    Lactic acid, plasma    Collection Time: 10/29/18 12:38 PM   Result Value Ref Range    Lactate (Lactic Acid) 2.6 (H) 0.5 - 2.2 mmol/L   TSH    Collection Time: 10/29/18 12:38 PM   Result Value Ref Range    TSH 2.406 0.400 - 4.000 uIU/mL   Urinalysis, Reflex to Urine Culture Urine, Clean Catch    Collection Time: 10/29/18  1:31 PM   Result Value Ref Range    Specimen UA Urine, Clean Catch     Color, UA Yellow Yellow, Straw, Triny    Appearance, UA Cloudy (A) Clear    pH, UA 5.0 5.0 - 8.0    Specific Gravity, UA 1.030 1.005 - 1.030    Protein, UA 2+ (A) Negative    Glucose, UA 3+ (A) Negative    Ketones, UA Negative Negative    Bilirubin (UA) Negative Negative    Occult Blood UA Negative Negative    Nitrite, UA Negative Negative    Leukocytes, UA Negative Negative   Urinalysis Microscopic    Collection Time: 10/29/18  1:31 PM   Result Value Ref Range    RBC, UA 0 0 - 4 /hpf    WBC, UA 2 0 - 5 /hpf    Bacteria, UA Few (A)  None-Occ /hpf    Yeast, UA None None    Squam Epithel, UA 2 /hpf    Hyaline Casts, UA 15 (A) 0-1/lpf /lpf    Microscopic Comment SEE COMMENT        Recent Labs   Lab 10/29/18  1235   POCTGLUCOSE 120*       Active Hospital Problems    Diagnosis  POA    *SIRS (systemic inflammatory response syndrome) [R65.10]  Unknown    Hypotension [I95.9]  Yes    Developmental disability [F89]  Unknown    Dehydration [E86.0]  Yes    Type 2 diabetes mellitus with hyperglycemia, with long-term current use of insulin [E11.65, Z79.4]  Not Applicable    Essential hypertension [I10]  Yes    Seizure disorder [G40.909]  Yes      Resolved Hospital Problems   No resolved problems to display.       IMAGING:   CXR  FINDINGS:  Heart size is normal, as is the appearance of the pulmonary vascularity.  Lung zones are clear, and free of significant airspace consolidation or volume loss.  No pleural fluid.  No abnormal mediastinal widening.  No pneumothorax.      Impression       No significant intrathoracic abnormality.       EKG:   Normal sinus rhythm  Moderate voltage criteria for LVH, may be normal variant  Prolonged QT  Abnormal ECG  When compared with ECG of 02-OCT-2018 09:00,  ST elevation now present in Inferior leads  T wave inversion no longer evident in Inferior leads    Assessment and Plan:  1. SIRS- leukocytosis (14), elevated lactate, hypotension, no clear source, ?viral etiology  - UA nonacute  - CXR nonacute  - lactate 2.6, will repeat  - blood cx x 2  - check stool studies 2/2 report of diarrhea  - IVF @100cc/hr  - tele monitoring   - hold abx for now as no clear source  - tylenol for fevers    2. HTN now with symptomatic hypotension  - hold home BP meds  - tele monitoring     3. HLD  - continue statin    4. IDDM2  - ISS  - bedside glucose montoring  - continue home lantus  - hold premeal insulin  - HbA1c >14 at last admission    5. Glaucoma  - continue eye drops    6. Developmental delay  - has 24 hr care at home    7.  Seizure d/o  - continue home meds  - seizure precautions    8. Depression  - continue venlafaxine                                                                                                                                                                                                                                                                                                                                                                                                                                                                                                                                                                                                                                                                                                                                                              Diet: diabetic  DVT PPx: SCDs  GI prophylaxis: n/a   Code status: FULL    Dispo:  Continue to monitor blood pressure, continue IV fluids, follow-up WBC count in the a.m., hold on antibiotics as this is likely a viral etiology, follow-up blood cultures    Sussy Jamison MD  Hospital Medicine Staff  [phone] pager

## 2018-10-29 NOTE — ED NOTES
Pt given sandwich and diet cran juice. Pt updated and reports no complaints at this time. Pt calm and cooperative.

## 2018-10-30 VITALS
WEIGHT: 131 LBS | HEIGHT: 69 IN | OXYGEN SATURATION: 100 % | BODY MASS INDEX: 19.4 KG/M2 | DIASTOLIC BLOOD PRESSURE: 87 MMHG | HEART RATE: 68 BPM | TEMPERATURE: 97 F | SYSTOLIC BLOOD PRESSURE: 135 MMHG | RESPIRATION RATE: 18 BRPM

## 2018-10-30 PROBLEM — R65.10 SIRS (SYSTEMIC INFLAMMATORY RESPONSE SYNDROME): Status: RESOLVED | Noted: 2018-10-29 | Resolved: 2018-10-30

## 2018-10-30 PROBLEM — I95.9 HYPOTENSION: Status: RESOLVED | Noted: 2018-10-29 | Resolved: 2018-10-30

## 2018-10-30 PROBLEM — E86.0 DEHYDRATION: Status: RESOLVED | Noted: 2018-10-02 | Resolved: 2018-10-30

## 2018-10-30 LAB
ANION GAP SERPL CALC-SCNC: 7 MMOL/L
BASOPHILS # BLD AUTO: 0.03 K/UL
BASOPHILS NFR BLD: 0.2 %
BUN SERPL-MCNC: 9 MG/DL
CALCIUM SERPL-MCNC: 9.4 MG/DL
CHLORIDE SERPL-SCNC: 104 MMOL/L
CO2 SERPL-SCNC: 26 MMOL/L
CREAT SERPL-MCNC: 0.8 MG/DL
DIFFERENTIAL METHOD: ABNORMAL
EOSINOPHIL # BLD AUTO: 0.1 K/UL
EOSINOPHIL NFR BLD: 1.1 %
ERYTHROCYTE [DISTWIDTH] IN BLOOD BY AUTOMATED COUNT: 12.5 %
EST. GFR  (AFRICAN AMERICAN): >60 ML/MIN/1.73 M^2
EST. GFR  (NON AFRICAN AMERICAN): >60 ML/MIN/1.73 M^2
GLUCOSE SERPL-MCNC: 172 MG/DL
HCT VFR BLD AUTO: 32.7 %
HGB BLD-MCNC: 10.7 G/DL
IMM GRANULOCYTES # BLD AUTO: 0.03 K/UL
IMM GRANULOCYTES NFR BLD AUTO: 0.2 %
LACTATE SERPL-SCNC: 1.1 MMOL/L
LYMPHOCYTES # BLD AUTO: 1.6 K/UL
LYMPHOCYTES NFR BLD: 12.6 %
MCH RBC QN AUTO: 25.7 PG
MCHC RBC AUTO-ENTMCNC: 32.7 G/DL
MCV RBC AUTO: 79 FL
MONOCYTES # BLD AUTO: 1.1 K/UL
MONOCYTES NFR BLD: 8.9 %
NEUTROPHILS # BLD AUTO: 9.6 K/UL
NEUTROPHILS NFR BLD: 77 %
NRBC BLD-RTO: 0 /100 WBC
PLATELET # BLD AUTO: 400 K/UL
PMV BLD AUTO: 11.3 FL
POCT GLUCOSE: 202 MG/DL (ref 70–110)
POCT GLUCOSE: 302 MG/DL (ref 70–110)
POTASSIUM SERPL-SCNC: 3.7 MMOL/L
RBC # BLD AUTO: 4.16 M/UL
SODIUM SERPL-SCNC: 137 MMOL/L
WBC # BLD AUTO: 12.54 K/UL

## 2018-10-30 PROCEDURE — 85025 COMPLETE CBC W/AUTO DIFF WBC: CPT

## 2018-10-30 PROCEDURE — 99217 PR OBSERVATION CARE DISCHARGE: CPT | Mod: ,,, | Performed by: HOSPITALIST

## 2018-10-30 PROCEDURE — 25000003 PHARM REV CODE 250: Performed by: INTERNAL MEDICINE

## 2018-10-30 PROCEDURE — 36415 COLL VENOUS BLD VENIPUNCTURE: CPT

## 2018-10-30 PROCEDURE — G0378 HOSPITAL OBSERVATION PER HR: HCPCS

## 2018-10-30 PROCEDURE — 83605 ASSAY OF LACTIC ACID: CPT

## 2018-10-30 PROCEDURE — 80048 BASIC METABOLIC PNL TOTAL CA: CPT

## 2018-10-30 RX ORDER — OLMESARTAN MEDOXOMIL 20 MG/1
10 TABLET ORAL DAILY
Qty: 30 TABLET | Refills: 2 | Status: ON HOLD
Start: 2018-10-30 | End: 2019-01-31

## 2018-10-30 RX ADMIN — CARBAMAZEPINE 400 MG: 200 TABLET ORAL at 09:10

## 2018-10-30 RX ADMIN — ASPIRIN 81 MG: 81 TABLET, COATED ORAL at 09:10

## 2018-10-30 RX ADMIN — ATORVASTATIN CALCIUM 80 MG: 20 TABLET, FILM COATED ORAL at 09:10

## 2018-10-30 RX ADMIN — VENLAFAXINE HYDROCHLORIDE 75 MG: 75 CAPSULE, EXTENDED RELEASE ORAL at 09:10

## 2018-10-30 NOTE — PLAN OF CARE
Patient is a 38 year old Mentally Disabled male admitted through the ER with Weakness and Hypotension.  [place] [facility] [place] [facility] [place] and was prescribed lesser dose of HTN medication.  Patient was still taking old dose and came in with hypotension.  Patient is expected to discharge home with non needs today.    Patient has a Care Facilitator, Avril, through [employer].  Care Attendant stays with patient at night and drives him to his job in the morning.  Patient Care Attendant is expected to drive him home and obtain anything he needs after discharge.    PCP  Ingrid Madrigal MD  [address] / [facility] [id number]  [phone]      [id number] - [place], [place] - [address]  [address]  [facility] [address]  Phone: [phone] Fax: [phone]    [facility] [address] [place], [place] - [address] AT [address]  [address]  [facility] [address]  Phone: [phone] Fax: [phone]      Extended Emergency Contact Information  Primary Emergency Contact: Malorie Field  Address: [address] [place] [address]  Home Phone: [phone]  Mobile Phone: [phone]  Relation: Mother  Secondary Emergency Contact: Avril Arcos  Address: [address] [place] [address]  Home Phone: [phone]  Mobile Phone: [phone]  Relation: Other       10/30/18 1552   Discharge Assessment   Assessment Type Discharge Planning Assessment   Prior to hospitilization cognitive status: Alert/Oriented   Prior to hospitalization functional status: Independent;Needs Assistance   Current cognitive status: Alert/Oriented   Current Functional Status: Independent;Needs Assistance   Lives With other (see comments)  (Patient Care Attendant)   Able to Return to Prior Arrangements  yes   Is patient able to care for self after discharge? Yes   Who are your caregiver(s) and their phone number(s)? Patient Care Facilitator, Avril   Patient's perception of discharge disposition home or selfcare   Patient currently being followed by outpatient case management? Yes   If yes, name of outpatient case management following: other (comments)  ([facility] Patient Care FacilitatorAvril)   Does the patient have transportation home? Yes   Transportation Available other (see comments)  (Facilitator)   Discharge Plan A Home   Discharge Plan B Home;Home Health

## 2018-10-30 NOTE — CONSULTS
"  [facility]  Adult Nutrition  Consult Note    SUMMARY     Recommendations    Recommendation/Intervention: 1. Continue diabetic 2000kcal diet. 2. Provide Boost Glucose Control TID. 3. Provide Beneprotein 1 packet TID. 4. Provide MVI qd.  Goals: 1. Pt to meet % EEN and EPN.  Nutrition Goal Status: progressing towards goal  Communication of RD Recs: other (comment)(POC)     Intervention  1. Recommended therapeutic diet.  2. Recommended oral nutrition supplement.  3. Performed NFPE.    Reason for Assessment    Reason for Assessment: consult  Diagnosis: other (see comments)(SIRS)  Relevant Medical History: IDDM, DKA, developmental delay, seizures, HTN, HLD, anxiety, depression  General Information Comments: Pt with A1c>14% diagnosed with malnutrition on previous admission 10/3. Pt has developmental delay and receives full-time care. Pt appears to have gained wt and nutritional status has improved. Pt's previous leg sores are nearly healed. Pt is not a candidate for education. Performed NFPE: pt is very thin with BMI 19.4, but does not have muscle wasting, only overall subcutaneous fat wasting. Will monitor.  Nutrition Discharge Planning: Pt to have adequate po intake to meet nutrition needs.    Nutrition Risk Screen    Nutrition Risk Screen: no indicators present    Nutrition/Diet History    Patient Reported Diet/Restrictions/Preferences: general  Do you have any cultural, spiritual, Lutheran conflicts, given your current situation?: none    Anthropometrics    Temp: 96.7 °F (35.9 °C)  Height: 5' 9" (175.3 cm)  Height (inches): 69 in  Weight Method: Bed Scale  Weight: 59.4 kg (131 lb)  Weight (lb): 131 lb  Ideal Body Weight (IBW), Male: 160 lb  % Ideal Body Weight, Male (lb): 81.88 lb  BMI (Calculated): 19.4       Lab/Procedures/Meds    Pertinent Labs Reviewed: reviewed  Pertinent Labs Comments: A1c 14%, Glu 172, POCT glu 115  Pertinent Medications Reviewed: reviewed  Pertinent Medications " Comments: detemir, statin, IV NS    Physical Findings/Assessment    Overall Physical Appearance: loss of subcutaneous fat, underweight    Estimated/Assessed Needs    Weight Used For Calorie Calculations: 59.4 kg (130 lb 15.3 oz)  Energy Calorie Requirements (kcal): 2079  Energy Need Method: Kcal/kg(35 kcal/kg)   Protein Requirements: 60-72g  Weight Used For Protein Calculations: 59.4 kg (130 lb 15.3 oz)  RDA Method (mL): 2079  CHO Requirement: 50% total kcal      Nutrition Prescription Ordered    Current Diet Order: diabetic 2000kcal diet    Evaluation of Received Nutrient/Fluid Intake       % Intake of Estimated Energy Needs: 75 - 100 %  % Meal Intake: 75 - 100 %    Nutrition Risk    Level of Risk/Frequency of Follow-up: low     Assessment and Plan  Nutrition Problem  Altered Nutrition-related lab values    Related to (etiology):   Hyperglycemia    Signs and Symptoms (as evidenced by):   A1c >14%      Nutrition Diagnosis Status:   New    Monitor and Evaluation    Food and Nutrient Intake: energy intake, food and beverage intake  Food and Nutrient Adminstration: diet order  Anthropometric Measurements: weight, weight change, body mass index  Biochemical Data, Medical Tests and Procedures: glucose/endocrine profile, inflammatory profile, electrolyte and renal panel, gastrointestinal profile, lipid profile  Nutrition-Focused Physical Findings: overall appearance, extremities, muscles and bones, head and eyes, skin     Nutrition Follow-Up    RD Follow-up?: Yes

## 2018-10-30 NOTE — PLAN OF CARE
Problem: Patient Care Overview  Goal: Plan of Care Review    Recommendations     Recommendation/Intervention: 1. Continue diabetic 2000kcal diet. 2. Provide Boost Glucose Control TID. 3. Provide Beneprotein 1 packet TID. 4. Provide MVI qd.  Goals: 1. Pt to meet % EEN and EPN.    Assessment and Plan  Nutrition Problem  Altered Nutrition-related lab values     Related to (etiology):   Hyperglycemia     Signs and Symptoms (as evidenced by):   A1c >14%

## 2018-10-30 NOTE — PLAN OF CARE
Problem: Patient Care Overview  Goal: Plan of Care Review  Outcome: Ongoing (interventions implemented as appropriate)  Patient sitting up in bed. Patient alert and oriented. Patient denies any complaints of pain or discomfort at this time. Will continue to monitor.

## 2018-10-30 NOTE — PLAN OF CARE
Patient discharged home with no needs with Facilitator, Avril.    Patient scheduled for hospital follow up with Dr Madrigal at [facility] 11/1/108 @ 10:30am.       10/30/18 8958   Final Note   Assessment Type Final Discharge Note   Anticipated Discharge Disposition Home   Hospital Follow Up  Appt(s) scheduled? Yes   Right Care Referral Info   Post Acute Recommendation No Care

## 2018-10-30 NOTE — PLAN OF CARE
Problem: Diabetes, Type 2 (Adult)  Goal: Signs and Symptoms of Listed Potential Problems Will be Absent, Minimized or Managed (Diabetes, Type 2)  Signs and symptoms of listed potential problems will be absent, minimized or managed by discharge/transition of care (reference Diabetes, Type 2 (Adult) CPG).  Outcome: Ongoing (interventions implemented as appropriate)  Blood sugar 115 at bedtime    Problem: Patient Care Overview  Goal: Plan of Care Review  Outcome: Ongoing (interventions implemented as appropriate)  Patient awake, alert and oriented; VS stable; no complaints of dizziness or pain; NSR on tele; no signs of acute distress noted; will continue to monitor; call light given to patient; instructed patient to call nurse for any assistance.

## 2018-11-01 NOTE — DISCHARGE SUMMARY
[facility] Hospital Medicine  Discharge Summary      Patient Name: Pavel Field  MRN: [id number]  Admission Date: 10/29/2018  Hospital Length of Stay: 0 days  Discharge Date and Time: 10/30/2018  3:20 PM  Attending Physician: Maurisio Mclean MD=  Discharging Provider: Maurisio Mclean MD  Primary Care Provider: Ingrid Madrigal MD    Hospital Medicine Team: [facility] HOSP MED A Maurisio Mclean MD    HPI: Pavel Field is a 38 y.o. male with past medical history of hypertension, hyperlipidemia, seizure disorder, depression, developmental delay who presents with hypotension.  Patient was at [facility] clinic today and noted to have low blood pressure.  Patient was recently just discharged for similar episode on 10/22/18.  Patient apparently was supposed at be taking a decreased dose of his blood pressure medication but according to the notes he had been taking the full dose.  Patient has a history of developmental delay.  He has 24 hr care at home.  Patient notes that this morning he felt dizzy, he denies any lightheadedness or syncope.  He notes now this has resolved.  He notes he has been trying to drink more water recently.  He denies any chest pain or shortness of breath.  He denies any recent illness.  He denies any fevers or chills.  He does note some diarrhea over the last 24 hr.  He denies any blood in the stool.  He denies any nausea or vomiting.  He denies any cough.     In the ED, patient was noted to be hypotensive to 97/58.  He was given 2 L fluid bolus with improvement.  He was also found to have a leukocytosis of 14 as well as an elevated lactic acid at 2.6.  A chest x-ray was nonacute and urinalysis was also nonacute.  Admission was requested for symptomatic hypotension.                                                                                                         Of note, patient is a very poor historian and unable to get a very accurate history.         *  No surgery found *      Hospital Course:   Anti-hypertensive medications were held for patient, with volume resuscitation, blood pressure normalized.  He was advised to hold Benicar until seeing his primary care doctor @ [facility].  In discussion with patient and his home care nursing scheduled an appointment 2 days from discharge.     On admission concern for SIRS, an infectious workup did not reveal any acute infection and elevated lactic acid level resolved with IV fluid administration alone.     Of not his Hgb A1c is >14, on multiple values in our system, patient with intermittent hyperglycemia.       Seen and examined day of discharge  Temp:  [96.7 °F (35.9 °C)-98.2 °F (36.8 °C)]   Pulse:  [61-98]   Resp:  [12-21]   BP: ()/(58-88)   SpO2:  [98 %-100 %]     General appearance: no distress, alert and oriented x 3  HEENT:  conjunctivae/corneas clear, PERRL, mucous membranes dry  Neck: supple, thyroid not enlarged  Pulm:   normal respiratory effort, CTAB, no wheezes, rales or rhonchi  Card: RRR, S1, S2 normal, no murmur, click, rub or gallop  Abd: soft, NT, ND, BS present; no masses, no organomegaly  Ext: no c/c/e  Pulses: 2+, symmetric  Skin: color, texture, turgor normal. No rashes or lesions  Neuro: no focal numbness or weakness, normal strength and tone, follows all commands appropriately   Psych: developmental delay        Consults:   Consults (From admission, onward)        Status Ordering Provider     Inpatient consult to Registered Dietitian/Nutritionist  Once     Provider:  (Not yet assigned)    BRUCE Maharaj          Final Active Diagnoses:    Diagnosis Date Noted POA    Essential hypertension [I10] 12/16/2016 Yes    Developmental disability [F89] 10/29/2018 Yes    Seizure disorder [G40.909] 12/16/2016 Yes    Type 2 diabetes mellitus with hyperglycemia, with long-term current use of insulin [E11.65, Z79.4] 10/02/2018 Not Applicable      Problems Resolved During this  Admission:    Diagnosis Date Noted Date Resolved POA    PRINCIPAL PROBLEM:  SIRS (systemic inflammatory response syndrome) [R65.10] 10/29/2018 10/30/2018 Yes    Hypotension [I95.9] 10/29/2018 10/30/2018 Yes    Dehydration [E86.0] 10/02/2018 10/30/2018 Yes      Discharged Condition: good    Disposition: Home-Health Care [facility]    Follow Up:  Follow-up Information     [facility]. Go on 11/1/2018.    Why:  For hospital follow-up with Dr Madrigal 11/1/2018 @ 10:30am   Contact information:  [address]  [facility] [address]  [phone]                 Patient Instructions:      Diet Adult Regular     Notify your health care provider if you experience any of the following:  persistent nausea and vomiting or diarrhea     Notify your health care provider if you experience any of the following:  temperature >100.4     Notify your health care provider if you experience any of the following:  difficulty breathing or increased cough     Notify your health care provider if you experience any of the following:  severe persistent headache     Notify your health care provider if you experience any of the following:  persistent dizziness, light-headedness, or visual disturbances     Activity as tolerated     Medications:  Reconciled Home Medications:      Medication List      CHANGE how you take these medications    olmesartan 20 MG tablet  Commonly known as:  BENICAR  Take 0.5 tablets (10 mg total) by mouth once daily. Please contact primary care provider for refills.HOLD UNTIL YOUR NEXT PRIMARY CARE APPT.  What changed:    · how much to take  · additional instructions        CONTINUE taking these medications    aspirin 81 MG EC tablet  Commonly known as:  ECOTRIN  Take 1 tablet by mouth once daily.     atorvastatin 80 MG tablet  Commonly known as:  LIPITOR  Take 80 mg by mouth once daily.     BASAGLAR KWIKPEN U-100 INSULIN 100 unit/mL (3 mL) Inpn pen  Generic drug:  insulin glargine  Inject 48  "Units into the skin every evening.     BD ULTRA-FINE BRUNILDA PEN NEEDLE 32 gauge x 5/32" Ndle  Generic drug:  pen needle, diabetic  Inject 1 Units into the skin 4 (four) times daily.     carBAMazepine 200 mg tablet  Commonly known as:  TEGRETOL  Take 400 mg by mouth 2 (two) times daily.     glimepiride 4 MG tablet  Commonly known as:  AMARYL  Take 1 tablet by mouth once daily.     insulin aspart U-100 100 unit/mL Inpn pen  Commonly known as:  NovoLOG  Inject 13 Units into the skin 3 (three) times daily with meals.     venlafaxine 75 MG 24 hr capsule  Commonly known as:  EFFEXOR-XR  Take 1 capsule by mouth once daily.        ASK your doctor about these medications    timolol maleate 0.5% 0.5 % Drop  Commonly known as:  TIMOPTIC  INSTILL ONE DROP IN OU BID            Significant Diagnostic Studies:   Results for HARJIT TUCKER (MRN [id number]) as of 11/1/2018 08:56   Ref. Range 10/29/2018 12:38 10/30/2018 06:12   WBC Latest Ref Range: 3.90 - 12.70 K/uL 14.51 (H) 12.54   RBC Latest Ref Range: 4.60 - 6.20 M/uL 4.58 (L) 4.16 (L)   Hemoglobin Latest Ref Range: 14.0 - 18.0 g/dL 11.7 (L) 10.7 (L)   Hematocrit Latest Ref Range: 40.0 - 54.0 % 36.9 (L) 32.7 (L)   MCV Latest Ref Range: 82 - 98 fL 81 (L) 79 (L)   MCH Latest Ref Range: 27.0 - 31.0 pg 25.5 (L) 25.7 (L)   MCHC Latest Ref Range: 32.0 - 36.0 g/dL 31.7 (L) 32.7   RDW Latest Ref Range: 11.5 - 14.5 % 12.2 12.5   Platelets Latest Ref Range: 150 - 350 K/uL 457 (H) 400 (H)   MPV Latest Ref Range: 9.2 - 12.9 fL 11.3 11.3   Gran% Latest Ref Range: 38.0 - 73.0 % 84.7 (H) 77.0 (H)   Gran # (ANC) Latest Ref Range: 1.8 - 7.7 K/uL 12.3 (H) 9.6 (H)   Immature Granulocytes Latest Ref Range: 0.0 - 0.5 % 0.3 0.2   Immature Grans (Abs) Latest Ref Range: 0.00 - 0.04 K/uL 0.05 (H) 0.03   Lymph% Latest Ref Range: 18.0 - 48.0 % 8.3 (L) 12.6 (L)   Lymph # Latest Ref Range: 1.0 - 4.8 K/uL 1.2 1.6   Mono% Latest Ref Range: 4.0 - 15.0 % 6.1 8.9   Mono # Latest Ref Range: 0.3 - 1.0 K/uL 0.9 1.1 (H) "   Eosinophil% Latest Ref Range: 0.0 - 8.0 % 0.4 1.1   Eos # Latest Ref Range: 0.0 - 0.5 K/uL 0.1 0.1   Basophil% Latest Ref Range: 0.0 - 1.9 % 0.2 0.2   Baso # Latest Ref Range: 0.00 - 0.20 K/uL 0.03 0.03     Results for HARJIT TUCKER (MRN [id number]) as of 11/1/2018 08:56   Ref. Range 10/29/2018 12:38 10/29/2018 18:43 10/30/2018 06:12   Sodium Latest Ref Range: 136 - 145 mmol/L 136  137   Potassium Latest Ref Range: 3.5 - 5.1 mmol/L 3.7  3.7   Chloride Latest Ref Range: 95 - 110 mmol/L 97  104   CO2 Latest Ref Range: 23 - 29 mmol/L 30 (H)  26   Anion Gap Latest Ref Range: 8 - 16 mmol/L 9  7 (L)   BUN, Bld Latest Ref Range: 6 - 20 mg/dL 13  9   Creatinine Latest Ref Range: 0.5 - 1.4 mg/dL 1.4  0.8   eGFR if non African American Latest Ref Range: >60 mL/min/1.73 m^2 >60.0  >60.0   eGFR if African American Latest Ref Range: >60 mL/min/1.73 m^2 >60.0  >60.0   Glucose Latest Ref Range: 70 - 110 mg/dL 126 (H)  172 (H)   Calcium Latest Ref Range: 8.7 - 10.5 mg/dL 10.1  9.4   Alkaline Phosphatase Latest Ref Range: 55 - 135 U/L 131     Total Protein Latest Ref Range: 6.0 - 8.4 g/dL 8.2     Albumin Latest Ref Range: 3.5 - 5.2 g/dL 3.0 (L)     Total Bilirubin Latest Ref Range: 0.1 - 1.0 mg/dL 0.2     AST Latest Ref Range: 10 - 40 U/L 12     ALT Latest Ref Range: 10 - 44 U/L 10     Lactate, Dao Latest Ref Range: 0.5 - 2.2 mmol/L 2.6 (H) 2.2 1.1   Hemoglobin A1C Latest Ref Range: 4.0 - 5.6 % >14.0 (H)     Estimated Avg Glucose Latest Ref Range: 68 - 131 mg/dL Unable to calculate     TSH Latest Ref Range: 0.400 - 4.000 uIU/mL 2.406       Pending Diagnostic Studies:     None        Indwelling Lines/Drains at time of discharge:   Lines/Drains/Airways          None          Time spent on the discharge of patient: 35 minutes  Patient was seen and examined on the date of discharge and determined to be suitable for discharge.         Maurisio Mclean MD  Department of Hospital Medicine  [facility]

## 2018-11-03 LAB
BACTERIA BLD CULT: NORMAL
BACTERIA BLD CULT: NORMAL

## 2019-01-25 ENCOUNTER — HOSPITAL ENCOUNTER (EMERGENCY)
Facility: HOSPITAL | Age: 39
Discharge: HOME | End: 2019-01-25
Attending: EMERGENCY MEDICINE
Payer: MEDICARE

## 2019-01-25 VITALS
RESPIRATION RATE: 18 BRPM | OXYGEN SATURATION: 100 % | TEMPERATURE: 99 F | WEIGHT: 130 LBS | HEART RATE: 98 BPM | DIASTOLIC BLOOD PRESSURE: 69 MMHG | BODY MASS INDEX: 19.2 KG/M2 | SYSTOLIC BLOOD PRESSURE: 111 MMHG

## 2019-01-25 DIAGNOSIS — R73.9 HYPERGLYCEMIA: ICD-10-CM

## 2019-01-25 DIAGNOSIS — R42 DIZZINESS: ICD-10-CM

## 2019-01-25 DIAGNOSIS — N17.9 AKI (ACUTE KIDNEY INJURY): Primary | ICD-10-CM

## 2019-01-25 LAB
ALBUMIN SERPL BCP-MCNC: 2.8 G/DL
ALLENS TEST: ABNORMAL
ALP SERPL-CCNC: 174 U/L
ALT SERPL W/O P-5'-P-CCNC: 25 U/L
ANION GAP SERPL CALC-SCNC: 13 MMOL/L
AST SERPL-CCNC: 19 U/L
B-OH-BUTYR BLD STRIP-SCNC: 1.8 MMOL/L
BACTERIA #/AREA URNS AUTO: NORMAL /HPF
BASOPHILS # BLD AUTO: 0.04 K/UL
BASOPHILS NFR BLD: 0.4 %
BILIRUB SERPL-MCNC: 0.2 MG/DL
BILIRUB UR QL STRIP: NEGATIVE
BUN SERPL-MCNC: 15 MG/DL (ref 6–30)
BUN SERPL-MCNC: 16 MG/DL
CALCIUM SERPL-MCNC: 9.5 MG/DL
CHLORIDE SERPL-SCNC: 87 MMOL/L
CHLORIDE SERPL-SCNC: 98 MMOL/L (ref 95–110)
CLARITY UR REFRACT.AUTO: CLEAR
CO2 SERPL-SCNC: 29 MMOL/L
COLOR UR AUTO: ABNORMAL
CREAT SERPL-MCNC: 1.1 MG/DL (ref 0.5–1.4)
CREAT SERPL-MCNC: 1.5 MG/DL
DIFFERENTIAL METHOD: ABNORMAL
EOSINOPHIL # BLD AUTO: 0.1 K/UL
EOSINOPHIL NFR BLD: 0.9 %
ERYTHROCYTE [DISTWIDTH] IN BLOOD BY AUTOMATED COUNT: 13 %
EST. GFR  (AFRICAN AMERICAN): >60 ML/MIN/1.73 M^2
EST. GFR  (NON AFRICAN AMERICAN): 58.2 ML/MIN/1.73 M^2
GLUCOSE SERPL-MCNC: 217 MG/DL (ref 70–110)
GLUCOSE SERPL-MCNC: 563 MG/DL
GLUCOSE UR QL STRIP: ABNORMAL
HCO3 UR-SCNC: 35.4 MMOL/L (ref 24–28)
HCT VFR BLD AUTO: 38.6 %
HCT VFR BLD CALC: 33 %PCV (ref 36–54)
HGB BLD-MCNC: 12.1 G/DL
HGB UR QL STRIP: NEGATIVE
IMM GRANULOCYTES # BLD AUTO: 0.04 K/UL
IMM GRANULOCYTES NFR BLD AUTO: 0.4 %
KETONES UR QL STRIP: ABNORMAL
LEUKOCYTE ESTERASE UR QL STRIP: NEGATIVE
LYMPHOCYTES # BLD AUTO: 1.3 K/UL
LYMPHOCYTES NFR BLD: 11.9 %
MCH RBC QN AUTO: 25.4 PG
MCHC RBC AUTO-ENTMCNC: 31.3 G/DL
MCV RBC AUTO: 81 FL
MICROSCOPIC COMMENT: NORMAL
MONOCYTES # BLD AUTO: 1.1 K/UL
MONOCYTES NFR BLD: 10.6 %
NEUTROPHILS # BLD AUTO: 8 K/UL
NEUTROPHILS NFR BLD: 75.8 %
NITRITE UR QL STRIP: NEGATIVE
NRBC BLD-RTO: 0 /100 WBC
PCO2 BLDA: 58.1 MMHG (ref 35–45)
PH SMN: 7.39 [PH] (ref 7.35–7.45)
PH UR STRIP: 5 [PH] (ref 5–8)
PLATELET # BLD AUTO: 397 K/UL
PMV BLD AUTO: 11 FL
PO2 BLDA: 21 MMHG (ref 40–60)
POC BE: 10 MMOL/L
POC IONIZED CALCIUM: 0.95 MMOL/L (ref 1.06–1.42)
POC SATURATED O2: 33 % (ref 95–100)
POC TCO2 (MEASURED): 28 MMOL/L (ref 23–29)
POC TCO2: 37 MMOL/L (ref 24–29)
POCT GLUCOSE: 248 MG/DL (ref 70–110)
POCT GLUCOSE: 349 MG/DL (ref 70–110)
POCT GLUCOSE: 482 MG/DL (ref 70–110)
POTASSIUM BLD-SCNC: 3.5 MMOL/L (ref 3.5–5.1)
POTASSIUM SERPL-SCNC: 4.4 MMOL/L
PROT SERPL-MCNC: 8.6 G/DL
PROT UR QL STRIP: NEGATIVE
RBC # BLD AUTO: 4.77 M/UL
RBC #/AREA URNS AUTO: 0 /HPF (ref 0–4)
SAMPLE: ABNORMAL
SAMPLE: ABNORMAL
SITE: ABNORMAL
SODIUM BLD-SCNC: 135 MMOL/L (ref 136–145)
SODIUM SERPL-SCNC: 129 MMOL/L
SP GR UR STRIP: 1.02 (ref 1–1.03)
SQUAMOUS #/AREA URNS AUTO: 0 /HPF
URN SPEC COLLECT METH UR: ABNORMAL
WBC # BLD AUTO: 10.52 K/UL
WBC #/AREA URNS AUTO: 0 /HPF (ref 0–5)
YEAST UR QL AUTO: NORMAL

## 2019-01-25 PROCEDURE — 80053 COMPREHEN METABOLIC PANEL: CPT

## 2019-01-25 PROCEDURE — 96374 THER/PROPH/DIAG INJ IV PUSH: CPT

## 2019-01-25 PROCEDURE — 96361 HYDRATE IV INFUSION ADD-ON: CPT

## 2019-01-25 PROCEDURE — 63600175 PHARM REV CODE 636 W HCPCS: Performed by: EMERGENCY MEDICINE

## 2019-01-25 PROCEDURE — 99285 EMERGENCY DEPT VISIT HI MDM: CPT | Mod: ,,, | Performed by: EMERGENCY MEDICINE

## 2019-01-25 PROCEDURE — 82962 GLUCOSE BLOOD TEST: CPT

## 2019-01-25 PROCEDURE — 25000003 PHARM REV CODE 250: Performed by: NURSE PRACTITIONER

## 2019-01-25 PROCEDURE — 99285 PR EMERGENCY DEPT VISIT,LEVEL V: ICD-10-PCS | Mod: ,,, | Performed by: EMERGENCY MEDICINE

## 2019-01-25 PROCEDURE — 81001 URINALYSIS AUTO W/SCOPE: CPT

## 2019-01-25 PROCEDURE — 82010 KETONE BODYS QUAN: CPT

## 2019-01-25 PROCEDURE — 93010 ELECTROCARDIOGRAM REPORT: CPT | Mod: ,,, | Performed by: INTERNAL MEDICINE

## 2019-01-25 PROCEDURE — 99285 EMERGENCY DEPT VISIT HI MDM: CPT | Mod: 25

## 2019-01-25 PROCEDURE — 93005 ELECTROCARDIOGRAM TRACING: CPT

## 2019-01-25 PROCEDURE — 96376 TX/PRO/DX INJ SAME DRUG ADON: CPT

## 2019-01-25 PROCEDURE — 82803 BLOOD GASES ANY COMBINATION: CPT

## 2019-01-25 PROCEDURE — 85025 COMPLETE CBC W/AUTO DIFF WBC: CPT

## 2019-01-25 PROCEDURE — 93010 EKG 12-LEAD: ICD-10-PCS | Mod: ,,, | Performed by: INTERNAL MEDICINE

## 2019-01-25 RX ADMIN — SODIUM CHLORIDE 1000 ML: 0.9 INJECTION, SOLUTION INTRAVENOUS at 03:01

## 2019-01-25 RX ADMIN — INSULIN HUMAN 10 UNITS: 100 INJECTION, SOLUTION PARENTERAL at 04:01

## 2019-01-25 RX ADMIN — INSULIN HUMAN 4 UNITS: 100 INJECTION, SOLUTION PARENTERAL at 06:01

## 2019-01-25 NOTE — ED PROVIDER NOTES
Encounter Date: 1/25/2019       History     Chief Complaint   Patient presents with    Dizziness     since yesterday. having to hold walls to guide himself. Also reports intermittent generalized weakness.      Pt is a 40 yo male with medical history of hypertension, hyperlipidemia, seizure disorder, depression, developmental delay who presenting to the ED for dizziness and increased bowel movements over the past few days.  Pt states he has become increasingly dizzy when standing and having to hold onto the wall when standing up.  Pt states he has recently had an increase in bowel movements but denies any diarrhea.  Pt denies any chest pain, shortness of breath, fever or chills.           Review of patient's allergies indicates:  No Known Allergies  Past Medical History:   Diagnosis Date    Anxiety     Depression     Diabetes mellitus     DKA (diabetic ketoacidoses)     Glaucoma     Hyperlipemia     Hypertension     Seizures      No past surgical history on file.  No family history on file.  Social History     Tobacco Use    Smoking status: Never Smoker    Smokeless tobacco: Never Used   Substance Use Topics    Alcohol use: No    Drug use: No     Review of Systems   Constitutional: Positive for activity change. Negative for appetite change, chills, fatigue and fever.   HENT: Negative for congestion and sore throat.    Eyes: Negative for visual disturbance.   Respiratory: Negative for cough, chest tightness, shortness of breath and wheezing.    Cardiovascular: Negative for chest pain and palpitations.   Gastrointestinal: Negative for abdominal pain, constipation, diarrhea, nausea and vomiting.   Genitourinary: Negative for decreased urine volume, difficulty urinating, dysuria and urgency.   Musculoskeletal: Negative for arthralgias, back pain and myalgias.   Skin: Negative for color change, pallor, rash and wound.   Neurological: Positive for dizziness and light-headedness. Negative for syncope, weakness,  numbness and headaches.   Hematological: Does not bruise/bleed easily.   All other systems reviewed and are negative.      Physical Exam     Initial Vitals [01/25/19 1348]   BP Pulse Resp Temp SpO2   (!) 107/58 109 18 98.7 °F (37.1 °C) 99 %      MAP       --         Physical Exam    Nursing note and vitals reviewed.  Constitutional: Vital signs are normal. He is cooperative.   HENT:   Head: Normocephalic and atraumatic.   Mouth/Throat: Uvula is midline and oropharynx is clear and moist. Mucous membranes are dry.   Eyes: Conjunctivae, EOM and lids are normal. Pupils are equal, round, and reactive to light. Right eye exhibits no nystagmus. Left eye exhibits no nystagmus.   Pupils equal round reactive 3-2 mm   Neck: Trachea normal, normal range of motion, full passive range of motion without pain and phonation normal. Neck supple. No JVD present.   Cardiovascular: Normal rate and regular rhythm.   Pulses:       Radial pulses are 2+ on the right side, and 2+ on the left side.   Pulmonary/Chest: Effort normal and breath sounds normal.   Abdominal: Soft. Normal appearance. He exhibits no distension. Bowel sounds are increased. There is no tenderness. There is no rigidity, no rebound and no guarding.   Musculoskeletal: Normal range of motion.   Neurological: He is alert and oriented to person, place, and time. GCS eye subscore is 4. GCS verbal subscore is 5. GCS motor subscore is 6.   Skin: Skin is warm, dry and intact. Capillary refill takes more than 3 seconds. No abrasion, no laceration and no rash noted. No cyanosis. Nails show no clubbing.         ED Course   Procedures  Labs Reviewed   CBC W/ AUTO DIFFERENTIAL - Abnormal; Notable for the following components:       Result Value    Hemoglobin 12.1 (*)     Hematocrit 38.6 (*)     MCV 81 (*)     MCH 25.4 (*)     MCHC 31.3 (*)     Platelets 397 (*)     Gran # (ANC) 8.0 (*)     Mono # 1.1 (*)     Gran% 75.8 (*)     Lymph% 11.9 (*)     All other components within normal  limits   COMPREHENSIVE METABOLIC PANEL - Abnormal; Notable for the following components:    Sodium 129 (*)     Chloride 87 (*)     Glucose 563 (*)     Creatinine 1.5 (*)     Total Protein 8.6 (*)     Albumin 2.8 (*)     Alkaline Phosphatase 174 (*)     eGFR if non  58.2 (*)     All other components within normal limits   BETA - HYDROXYBUTYRATE, SERUM - Abnormal; Notable for the following components:    Beta-Hydroxybutyrate 1.8 (*)     All other components within normal limits   URINALYSIS, REFLEX TO URINE CULTURE - Abnormal; Notable for the following components:    Glucose, UA 3+ (*)     Ketones, UA Trace (*)     All other components within normal limits    Narrative:     Preferred Collection Type->Urine, Clean Catch   ISTAT PROCEDURE - Abnormal; Notable for the following components:    POC PCO2 58.1 (*)     POC PO2 21 (*)     POC HCO3 35.4 (*)     POC SATURATED O2 33 (*)     POC TCO2 37 (*)     All other components within normal limits   POCT GLUCOSE - Abnormal; Notable for the following components:    POCT Glucose 482 (*)     All other components within normal limits   POCT GLUCOSE - Abnormal; Notable for the following components:    POCT Glucose 349 (*)     All other components within normal limits   ISTAT PROCEDURE - Abnormal; Notable for the following components:    POC Glucose 217 (*)     POC Sodium 135 (*)     POC Ionized Calcium 0.95 (*)     POC Hematocrit 33 (*)     All other components within normal limits   POCT GLUCOSE - Abnormal; Notable for the following components:    POCT Glucose 248 (*)     All other components within normal limits   URINALYSIS MICROSCOPIC    Narrative:     Preferred Collection Type->Urine, Clean Catch        ECG Results          EKG 12-lead (Final result)  Result time 01/25/19 14:43:08    Final result by Interface, Lab In [facility] (01/25/19 14:43:08)                 Narrative:    Test Reason : R42,    Vent. Rate : 101 BPM     Atrial Rate : 101 BPM     P-R Int : 138  ms          QRS Dur : 100 ms      QT Int : 388 ms       P-R-T Axes : 081 089 044 degrees     QTc Int : 503 ms    Sinus tachycardia  Minimal voltage criteria for LVH, may be normal variant  Borderline Abnormal ECG  When compared with ECG of 29-OCT-2018 12:37,  No significant change was found  Confirmed by Jacqueline Alston MD ([id number]) on 1/25/2019 2:42:56 PM    Referred By:             Confirmed By:Jacqueline Alston MD                            Imaging Results          X-Ray Chest AP Portable (Final result)  Result time 01/25/19 17:30:53    Final result by Angelito Mayes MD (01/25/19 17:30:53)                 Impression:      No acute process.      Electronically signed by: Angelito Mayes MD  Date:    01/25/2019  Time:    17:30             Narrative:    EXAMINATION:  XR CHEST AP PORTABLE    CLINICAL HISTORY:  hyperglycemia;    TECHNIQUE:  Single frontal view of the chest was performed.    COMPARISON:  10/29/2018.    FINDINGS:  The trachea is unremarkable.  The cardiomediastinal silhouette is within normal limits.  The hilar structures are unremarkable.  The hemidiaphragms are within normal limits.  There is no evidence of free air beneath the hemidiaphragms.  There are no pleural effusions.  There is no evidence of a pneumothorax.  There is no evidence of pneumomediastinum.  No airspace opacities are present.  The osseous structures are unremarkable.  The subcutaneous tissues are within normal limits.                                       APC / Resident Notes:   Emergent evaluation of a 40 yo male patient presenting to the ER with chief complaint of increased dizziness when standing up.  Patient states he is religiously began having more bowel movements than normal. Patient states these bowel movements are soft.  Patient states when he stands up he has to hold onto the wall because he feels like he is going to fall.  Patient states he has had similar symptoms in the past and was diagnosed with orthostatic hypotension.  On exam  patient A&O x3. Pupils equal round reactive 3-2 mm.  No JVD noted.  Cap refill greater than 3 sec.  Abdomen soft and nontender.  Bowel sounds hyperactive.  Breath sounds clear bilaterally. Strength 5/5 in all extremities.  Patient is neurologically intact. Patient able to ambulate with no assistance.  Negative Romberg noted.  I will get labs, imaging, hydrate and reassess.  Differential diagnoses include but are not limited to vertigo, orthostatic hypotension, hypoglycemia, hyponatremia, dehydration, middle ear infection, arrhythmia, DKA, elevated glucose.  I discussed the care of this patient with my Supervising Physician.      Patient's CBC unremarkable.  H&H stable at 12.1 and 38.6.  CMP shows a glucose of 563.  Patient being treated with IV fluids and insulin.  Creatinine elevated 1.5.  Beta hydroxybutyrate slightly elevated 1.8.  UA negative for any acute infectious process.  Chest x-ray negative for any acute abnormalities.  We will continue IV fluids and insulin and reassess.  Patient states feeling better from dizziness.  Patient updated on labs and plan of care.  All questions and concerns addressed.    Pt signed out to Dr. Torre pending repeat glucose and creatine.                  Clinical Impression:   The primary encounter diagnosis was BRYSON (acute kidney injury). Diagnoses of Dizziness and Hyperglycemia were also pertinent to this visit.      Disposition:   Condition: Stable                        Saskia Gerardo NP  01/26/19 [id number]

## 2019-01-25 NOTE — ED NOTES
"Patient arrives in no apparent distress with complaints of dizziness and weakness over past two days - patient describes always having orthostatic hypotension but states that he has been "weak" since yesterday - denies pains - denies SOB - denies nausea or emesis and no changes in appetite  "

## 2019-01-26 NOTE — PROVIDER PROGRESS NOTES - EMERGENCY DEPT.
Encounter Date: 1/25/2019    ED Physician Progress Notes        Physician Note:   Patient's symptoms are entirely resolved.  He is no longer complaining dizziness.  Repeat labs with improving creatinine.  Repeat vitals are improved, patient is not getting dizziness when he stands up.  Finger stick is down.  Patient lives in program in the low sure he takes his medication properly.  No signs of infection clinically no cough, no nausea, no vomiting, no diarrhea, unclear cause of elevated fingerstick, patient does endorse having pizza party recently.    Return precautions for elevated fingerstick, or other new, worrisome or worsening symptoms.  Outpatient follow-up for continued management of diabetes.

## 2019-01-26 NOTE — DISCHARGE INSTRUCTIONS
Your blood sugar was very high today, and you're likely dehydrated which explains her dizziness.  Please continue to measure your blood sugar frequently and take her insulin as prescribed.    Please stay hydrated, which sugar drinks.     He have any signs of infections to his fever, coughing, pain with urination, or any other new, worsening or worrisome symptoms as return immediately to the emergency department.

## 2019-01-28 LAB — POCT GLUCOSE: >500 MG/DL (ref 70–110)

## 2019-01-30 ENCOUNTER — HOSPITAL ENCOUNTER (OUTPATIENT)
Facility: HOSPITAL | Age: 39
Discharge: HOME HEALTH | End: 2019-02-01
Attending: EMERGENCY MEDICINE | Admitting: STUDENT IN AN ORGANIZED HEALTH CARE EDUCATION/TRAINING PROGRAM
Payer: MEDICARE

## 2019-01-30 DIAGNOSIS — D72.829 LEUKOCYTOSIS, UNSPECIFIED TYPE: Primary | ICD-10-CM

## 2019-01-30 DIAGNOSIS — E11.65 TYPE 2 DIABETES MELLITUS WITH HYPERGLYCEMIA, WITH LONG-TERM CURRENT USE OF INSULIN: ICD-10-CM

## 2019-01-30 DIAGNOSIS — I95.9 HYPOTENSION, UNSPECIFIED HYPOTENSION TYPE: ICD-10-CM

## 2019-01-30 DIAGNOSIS — Z79.4 TYPE 2 DIABETES MELLITUS WITH HYPERGLYCEMIA, WITH LONG-TERM CURRENT USE OF INSULIN: ICD-10-CM

## 2019-01-30 DIAGNOSIS — R53.1 WEAKNESS GENERALIZED: ICD-10-CM

## 2019-01-30 DIAGNOSIS — R53.83 FATIGUE: ICD-10-CM

## 2019-01-30 PROBLEM — F32.A DEPRESSION: Status: ACTIVE | Noted: 2019-01-30

## 2019-01-30 LAB
ALBUMIN SERPL BCP-MCNC: 2.3 G/DL
ALP SERPL-CCNC: 135 U/L
ALT SERPL W/O P-5'-P-CCNC: 16 U/L
ANION GAP SERPL CALC-SCNC: 10 MMOL/L
ANISOCYTOSIS BLD QL SMEAR: SLIGHT
AST SERPL-CCNC: 14 U/L
B-OH-BUTYR BLD STRIP-SCNC: 0 MMOL/L
BACTERIA #/AREA URNS AUTO: ABNORMAL /HPF
BASOPHILS # BLD AUTO: 0.04 K/UL
BASOPHILS NFR BLD: 0.2 %
BILIRUB SERPL-MCNC: 0.1 MG/DL
BILIRUB UR QL STRIP: NEGATIVE
BUN SERPL-MCNC: 18 MG/DL
CALCIUM SERPL-MCNC: 10 MG/DL
CHLORIDE SERPL-SCNC: 93 MMOL/L
CLARITY UR REFRACT.AUTO: ABNORMAL
CO2 SERPL-SCNC: 32 MMOL/L
COLOR UR AUTO: ABNORMAL
CREAT SERPL-MCNC: 1.2 MG/DL
DIFFERENTIAL METHOD: ABNORMAL
EOSINOPHIL # BLD AUTO: 0.1 K/UL
EOSINOPHIL NFR BLD: 0.3 %
ERYTHROCYTE [DISTWIDTH] IN BLOOD BY AUTOMATED COUNT: 13.2 %
EST. GFR  (AFRICAN AMERICAN): >60 ML/MIN/1.73 M^2
EST. GFR  (NON AFRICAN AMERICAN): >60 ML/MIN/1.73 M^2
ESTIMATED AVG GLUCOSE: ABNORMAL MG/DL
GLUCOSE SERPL-MCNC: 62 MG/DL
GLUCOSE UR QL STRIP: ABNORMAL
HBA1C MFR BLD HPLC: >14 %
HCT VFR BLD AUTO: 36.7 %
HGB BLD-MCNC: 11.2 G/DL
HGB UR QL STRIP: NEGATIVE
HYALINE CASTS UR QL AUTO: 13 /LPF
IMM GRANULOCYTES # BLD AUTO: 0.14 K/UL
IMM GRANULOCYTES NFR BLD AUTO: 0.6 %
KETONES UR QL STRIP: ABNORMAL
LACTATE SERPL-SCNC: 2 MMOL/L
LEUKOCYTE ESTERASE UR QL STRIP: NEGATIVE
LYMPHOCYTES # BLD AUTO: 2.2 K/UL
LYMPHOCYTES NFR BLD: 9.3 %
MCH RBC QN AUTO: 25.1 PG
MCHC RBC AUTO-ENTMCNC: 30.5 G/DL
MCV RBC AUTO: 82 FL
MICROSCOPIC COMMENT: ABNORMAL
MONOCYTES # BLD AUTO: 2 K/UL
MONOCYTES NFR BLD: 8.7 %
NEUTROPHILS # BLD AUTO: 18.8 K/UL
NEUTROPHILS NFR BLD: 80.9 %
NITRITE UR QL STRIP: NEGATIVE
NRBC BLD-RTO: 0 /100 WBC
PH UR STRIP: 5 [PH] (ref 5–8)
PLATELET # BLD AUTO: 504 K/UL
PLATELET BLD QL SMEAR: ABNORMAL
PMV BLD AUTO: 11 FL
POCT GLUCOSE: 124 MG/DL (ref 70–110)
POCT GLUCOSE: 271 MG/DL (ref 70–110)
POCT GLUCOSE: 342 MG/DL (ref 70–110)
POCT GLUCOSE: 371 MG/DL (ref 70–110)
POCT GLUCOSE: 407 MG/DL (ref 70–110)
POCT GLUCOSE: 71 MG/DL (ref 70–110)
POTASSIUM SERPL-SCNC: 3.5 MMOL/L
PROT SERPL-MCNC: 8.5 G/DL
PROT UR QL STRIP: ABNORMAL
RBC # BLD AUTO: 4.46 M/UL
RBC #/AREA URNS AUTO: 0 /HPF (ref 0–4)
SODIUM SERPL-SCNC: 135 MMOL/L
SP GR UR STRIP: 1.02 (ref 1–1.03)
SQUAMOUS #/AREA URNS AUTO: 0 /HPF
URN SPEC COLLECT METH UR: ABNORMAL
WBC # BLD AUTO: 23.27 K/UL
WBC #/AREA URNS AUTO: 5 /HPF (ref 0–5)
YEAST UR QL AUTO: ABNORMAL

## 2019-01-30 PROCEDURE — 63600175 PHARM REV CODE 636 W HCPCS: Performed by: STUDENT IN AN ORGANIZED HEALTH CARE EDUCATION/TRAINING PROGRAM

## 2019-01-30 PROCEDURE — 25000003 PHARM REV CODE 250: Performed by: STUDENT IN AN ORGANIZED HEALTH CARE EDUCATION/TRAINING PROGRAM

## 2019-01-30 PROCEDURE — 99285 EMERGENCY DEPT VISIT HI MDM: CPT | Mod: ,,, | Performed by: EMERGENCY MEDICINE

## 2019-01-30 PROCEDURE — 81001 URINALYSIS AUTO W/SCOPE: CPT

## 2019-01-30 PROCEDURE — 83036 HEMOGLOBIN GLYCOSYLATED A1C: CPT

## 2019-01-30 PROCEDURE — 93005 ELECTROCARDIOGRAM TRACING: CPT

## 2019-01-30 PROCEDURE — 25000003 PHARM REV CODE 250: Performed by: EMERGENCY MEDICINE

## 2019-01-30 PROCEDURE — 85025 COMPLETE CBC W/AUTO DIFF WBC: CPT

## 2019-01-30 PROCEDURE — 96361 HYDRATE IV INFUSION ADD-ON: CPT

## 2019-01-30 PROCEDURE — 83605 ASSAY OF LACTIC ACID: CPT

## 2019-01-30 PROCEDURE — 96372 THER/PROPH/DIAG INJ SC/IM: CPT | Mod: 59

## 2019-01-30 PROCEDURE — 99285 EMERGENCY DEPT VISIT HI MDM: CPT | Mod: 25

## 2019-01-30 PROCEDURE — S5571 INSULIN DISPOS PEN 3 ML: HCPCS | Performed by: STUDENT IN AN ORGANIZED HEALTH CARE EDUCATION/TRAINING PROGRAM

## 2019-01-30 PROCEDURE — 93010 EKG 12-LEAD: ICD-10-PCS | Mod: ,,, | Performed by: INTERNAL MEDICINE

## 2019-01-30 PROCEDURE — 93010 ELECTROCARDIOGRAM REPORT: CPT | Mod: ,,, | Performed by: INTERNAL MEDICINE

## 2019-01-30 PROCEDURE — 96360 HYDRATION IV INFUSION INIT: CPT

## 2019-01-30 PROCEDURE — 87040 BLOOD CULTURE FOR BACTERIA: CPT

## 2019-01-30 PROCEDURE — 82010 KETONE BODYS QUAN: CPT

## 2019-01-30 PROCEDURE — 99219 PR INITIAL OBSERVATION CARE,LEVL II: CPT | Mod: GC,,, | Performed by: STUDENT IN AN ORGANIZED HEALTH CARE EDUCATION/TRAINING PROGRAM

## 2019-01-30 PROCEDURE — 82962 GLUCOSE BLOOD TEST: CPT

## 2019-01-30 PROCEDURE — 99285 PR EMERGENCY DEPT VISIT,LEVEL V: ICD-10-PCS | Mod: ,,, | Performed by: EMERGENCY MEDICINE

## 2019-01-30 PROCEDURE — G0378 HOSPITAL OBSERVATION PER HR: HCPCS

## 2019-01-30 PROCEDURE — 99219 PR INITIAL OBSERVATION CARE,LEVL II: ICD-10-PCS | Mod: GC,,, | Performed by: STUDENT IN AN ORGANIZED HEALTH CARE EDUCATION/TRAINING PROGRAM

## 2019-01-30 PROCEDURE — 80053 COMPREHEN METABOLIC PANEL: CPT

## 2019-01-30 RX ORDER — INSULIN ASPART 100 [IU]/ML
10 INJECTION, SOLUTION INTRAVENOUS; SUBCUTANEOUS
Status: DISCONTINUED | OUTPATIENT
Start: 2019-01-31 | End: 2019-01-31

## 2019-01-30 RX ORDER — CARBAMAZEPINE 200 MG/1
400 TABLET ORAL 2 TIMES DAILY
Status: DISCONTINUED | OUTPATIENT
Start: 2019-01-30 | End: 2019-02-01 | Stop reason: HOSPADM

## 2019-01-30 RX ORDER — SODIUM CHLORIDE 9 MG/ML
1000 INJECTION, SOLUTION INTRAVENOUS
Status: COMPLETED | OUTPATIENT
Start: 2019-01-30 | End: 2019-01-30

## 2019-01-30 RX ORDER — INSULIN ASPART 100 [IU]/ML
0-5 INJECTION, SOLUTION INTRAVENOUS; SUBCUTANEOUS
Status: DISCONTINUED | OUTPATIENT
Start: 2019-01-30 | End: 2019-02-01 | Stop reason: HOSPADM

## 2019-01-30 RX ORDER — IBUPROFEN 200 MG
16 TABLET ORAL
Status: DISCONTINUED | OUTPATIENT
Start: 2019-01-30 | End: 2019-02-01 | Stop reason: HOSPADM

## 2019-01-30 RX ORDER — SODIUM CHLORIDE 0.9 % (FLUSH) 0.9 %
5 SYRINGE (ML) INJECTION
Status: DISCONTINUED | OUTPATIENT
Start: 2019-01-30 | End: 2019-02-01 | Stop reason: HOSPADM

## 2019-01-30 RX ORDER — IBUPROFEN 200 MG
24 TABLET ORAL
Status: DISCONTINUED | OUTPATIENT
Start: 2019-01-30 | End: 2019-02-01 | Stop reason: HOSPADM

## 2019-01-30 RX ORDER — ASPIRIN 81 MG/1
81 TABLET ORAL DAILY
Status: DISCONTINUED | OUTPATIENT
Start: 2019-01-31 | End: 2019-02-01 | Stop reason: HOSPADM

## 2019-01-30 RX ORDER — ONDANSETRON 8 MG/1
8 TABLET, ORALLY DISINTEGRATING ORAL EVERY 8 HOURS PRN
Status: DISCONTINUED | OUTPATIENT
Start: 2019-01-30 | End: 2019-02-01 | Stop reason: HOSPADM

## 2019-01-30 RX ORDER — INSULIN ASPART 100 [IU]/ML
6 INJECTION, SOLUTION INTRAVENOUS; SUBCUTANEOUS
Status: DISCONTINUED | OUTPATIENT
Start: 2019-01-30 | End: 2019-01-30

## 2019-01-30 RX ORDER — ATORVASTATIN CALCIUM 20 MG/1
80 TABLET, FILM COATED ORAL DAILY
Status: DISCONTINUED | OUTPATIENT
Start: 2019-01-31 | End: 2019-02-01 | Stop reason: HOSPADM

## 2019-01-30 RX ORDER — VENLAFAXINE HYDROCHLORIDE 75 MG/1
75 CAPSULE, EXTENDED RELEASE ORAL DAILY
Status: DISCONTINUED | OUTPATIENT
Start: 2019-01-31 | End: 2019-02-01 | Stop reason: HOSPADM

## 2019-01-30 RX ORDER — GLUCAGON 1 MG
1 KIT INJECTION
Status: DISCONTINUED | OUTPATIENT
Start: 2019-01-30 | End: 2019-02-01 | Stop reason: HOSPADM

## 2019-01-30 RX ADMIN — SODIUM CHLORIDE 1000 ML: 0.9 INJECTION, SOLUTION INTRAVENOUS at 12:01

## 2019-01-30 RX ADMIN — INSULIN ASPART 6 UNITS: 100 INJECTION, SOLUTION INTRAVENOUS; SUBCUTANEOUS at 07:01

## 2019-01-30 RX ADMIN — INSULIN DETEMIR 32 UNITS: 100 INJECTION, SOLUTION SUBCUTANEOUS at 09:01

## 2019-01-30 RX ADMIN — CARBAMAZEPINE 400 MG: 200 TABLET ORAL at 09:01

## 2019-01-30 RX ADMIN — SODIUM CHLORIDE 1000 ML: 0.9 INJECTION, SOLUTION INTRAVENOUS at 07:01

## 2019-01-30 NOTE — ASSESSMENT & PLAN NOTE
Was previously on olmesartan, but held on previous discharge, to be reassessed by PCP for need   Will continue to hold in setting of hypotension   Likely will not need going forward

## 2019-01-30 NOTE — ED NOTES
Pt. Resting in bed; no acute distress noted; respirations even and unlabored; care taker at bedside

## 2019-01-30 NOTE — HPI
38 yo M with background of HTn, HLD, IDDM (diagnosed at age 23 with A1C >20), depression, seizure, developmental delay presenting for 2 day history of fatigue and weakness. Patient has been feeling weak and dizzy for 2 days, experienced an unwitnessed fall without loss of consciousness and hit his head without LOC or apparent injury. Pt had 3 episodes of vomiting last night and was unable to keep any food down, but was able to tolerate breakfast this morning. Pt continued to feel weak and attempted to feel well as he went to work, but left to present to ED due to excessive weakness. Pt had a similar presentation to ED 4 days ago with nausea, vomiting, weakness and Glu >500, he was given insulin and IVF and discharged. Pt today had Glu of 60 and hypotensive into 80s systolic. Pt denies diarrhea, vomiting today, abdominal pain, fevers, CP or SOB. Pt sees wound care every other week for lower leg wounds that do not heal properly.     Furthermore, patient reports that he has had multiple falls over the last few months. Pt reports that he collapses, without tripping, due to his weakness. He denies losing consciousness during these falls. He reports that his last seizure was in 1996 and he is compliant with his medication. Pt reports taking 48U of detemir nightly, 15U lantus TIDWM and glimiperide.     Pt received 2L IVF in ED and some food, no longer reporting nausea or weakness.

## 2019-01-30 NOTE — ASSESSMENT & PLAN NOTE
WBC 23 on admission, afebrile  Obtain UA and blood cultures  Will hold off on antibiotic therapy at this time as low suspicion for sepsis

## 2019-01-30 NOTE — ASSESSMENT & PLAN NOTE
Pt takes 15U aspart TIDWM and 48U detemir  Will start on 6U aspart TIDWM and 20U detemir QHS   Obtain BH4

## 2019-01-30 NOTE — ED NOTES
Pt. Resting in bed; respirations even and unlabored; no acute distress noted; care givers at bedside

## 2019-01-30 NOTE — ED NOTES
Patient resting in bed; no acute distress noted; respirations even and unlabored; care taker at bedside

## 2019-01-30 NOTE — ED NOTES
Patient received with complaint of dizziness and 3 episodes of vomiting onset yesterday; pt. Reports the dizziness caused a fall yesterday; reports hitting his head; care giver reports that he fell on the tile floor; denies chest pain; denies shortness of breath; hypotension noted; Wounds noted to bilateral lower extremities ;     No LDA's in place on arrival to department.    Care giver present.    Pain:  denies    Psychosocial:  Patient is calm and cooperative.  Patients insight and judgement are appropriate to situation.  Appears clean, well maintained, with clothing appropriate to environment.  No evidence of hallucinations, delusions, or psychosis.    Neuro:  Eyes open spontaneously.  Awake, alert.  Oriented x 4.  Speech clear and appropriate.  Tolerating saliva secretions well.  Able to follow commands, demonstrating ability to actively and appropriately communicate within context of current conversation.  Symmetrical facial muscles.  No evidence of impaired sensation.      Airway:  Bilateral chest rise and fall.  RR regular and non labored.  Air entry patent.  Lung sounds clear to upper lobes of lungs; diminished to lower lobes;    Circulatory:  Skin warm, dry.  Apical and radial pulses strong and regular.      Abdomen:  Abdomen soft and non distended.     Urinary:  Patient reports routine urination without pain.

## 2019-01-30 NOTE — ASSESSMENT & PLAN NOTE
Pt hypotensive on presentation, given 2L IVF and became normotensive  Will obtain orthostatic vitals

## 2019-01-30 NOTE — SUBJECTIVE & OBJECTIVE
"Past Medical History:   Diagnosis Date    Anxiety     Depression     Diabetes mellitus     DKA (diabetic ketoacidoses)     Glaucoma     Hyperlipemia     Hypertension     Seizures        History reviewed. No pertinent surgical history.    Review of patient's allergies indicates:  No Known Allergies    No current facility-administered medications on file prior to encounter.      Current Outpatient Medications on File Prior to Encounter   Medication Sig    aspirin (ECOTRIN) 81 MG EC tablet Take 1 tablet by mouth once daily.    atorvastatin (LIPITOR) 80 MG tablet Take 80 mg by mouth once daily.    BASAGLAR KWIKPEN U-100 INSULIN 100 unit/mL (3 mL) InPn pen Inject 48 Units into the skin every evening.     carBAMazepine (TEGRETOL) 200 mg tablet Take 400 mg by mouth 2 (two) times daily.    glimepiride (AMARYL) 4 MG tablet Take 1 tablet by mouth once daily.    insulin aspart U-100 (NOVOLOG) 100 unit/mL InPn pen Inject 15 Units into the skin 3 (three) times daily with meals.     venlafaxine (EFFEXOR-XR) 75 MG 24 hr capsule Take 1 capsule by mouth once daily.    BD ULTRA-FINE BRUNILDA PEN NEEDLE 32 gauge x 5/32" Ndle Inject 1 Units into the skin 4 (four) times daily.    olmesartan (BENICAR) 20 MG tablet Take 0.5 tablets (10 mg total) by mouth once daily. Please contact primary care provider for refills.HOLD UNTIL YOUR NEXT PRIMARY CARE APPT.    timolol maleate 0.5% (TIMOPTIC) 0.5 % Drop INSTILL ONE DROP IN OU BID     Family History     None        Tobacco Use    Smoking status: Never Smoker    Smokeless tobacco: Never Used   Substance and Sexual Activity    Alcohol use: No    Drug use: No    Sexual activity: Not on file     Review of Systems   Constitutional: Positive for fatigue. Negative for appetite change, chills and fever.   Respiratory: Negative for cough and shortness of breath.    Cardiovascular: Negative for chest pain and leg swelling.   Gastrointestinal: Positive for nausea and vomiting. Negative " for abdominal distention and abdominal pain.   Genitourinary: Negative for difficulty urinating, dysuria, flank pain and frequency.   Musculoskeletal: Negative.    Skin: Positive for wound (L elbow and bilateral LL).   Neurological: Positive for dizziness, syncope, weakness and light-headedness. Negative for seizures and speech difficulty.     Objective:     Vital Signs (Most Recent):  Temp: 97.8 °F (36.6 °C) (01/30/19 1427)  Pulse: 88 (01/30/19 1658)  Resp: 18 (01/30/19 1057)  BP: 114/79 (01/30/19 1658)  SpO2: 100 % (01/30/19 1658) Vital Signs (24h Range):  Temp:  [97.5 °F (36.4 °C)-97.9 °F (36.6 °C)] 97.8 °F (36.6 °C)  Pulse:  [80-99] 88  Resp:  [18] 18  SpO2:  [99 %-100 %] 100 %  BP: ()/(52-79) 114/79     Weight: 59 kg (130 lb)  Body mass index is 19.2 kg/m².    Physical Exam   Constitutional: He is oriented to person, place, and time. No distress.   Thin-disheveled appearing male   Eyes: Conjunctivae are normal. Pupils are equal, round, and reactive to light.   Cardiovascular: Normal rate, regular rhythm and normal heart sounds.   Pulmonary/Chest: Effort normal and breath sounds normal.   Abdominal: Soft. There is no tenderness.   Musculoskeletal: He exhibits no edema or tenderness.   Neurological: He is alert and oriented to person, place, and time.   Skin: He is not diaphoretic.   Bilateral LL wounds, not completely healed, not fully healed. L elbow abrasions, healing.   Nursing note and vitals reviewed.        CRANIAL NERVES     CN III, IV, VI   Pupils are equal, round, and reactive to light.       Significant Labs:   CBC:   Recent Labs   Lab 01/30/19  1215   WBC 23.27*   HGB 11.2*   HCT 36.7*   *       Significant Imaging: I have reviewed all pertinent imaging results/findings within the past 24 hours.

## 2019-01-30 NOTE — ED PROVIDER NOTES
Encounter Date: 1/30/2019       History     Chief Complaint   Patient presents with    Fatigue     diabetic been feeling weak since yest,      Mr Field is a 40 yo  male patient with PMHx of HTN, HLD, IDDM, seizure disorder, depression , and developmental delay that presents to the ED with three day history of fatigue, light-headedness upon standing with three episodes of vomiting last night. Pt hypotensive at 80/52 in triage on arrival to ED. Pt seen in ED four days ago for hyperglycemia and BRYSON. Pt's family states that in the last three days he has been sleeping a lot and not properly eating and drinking as he should. Reports compliance with his medications. Pt currently complaining of light-headedness, weakness and fatigue. Pt currently denies any fevers, chills, myalgias, chest pain, shortness of breath, cough, abdominal pain, N/V/D, urinary symptoms, headaches, numbness.            Review of patient's allergies indicates:  No Known Allergies  Past Medical History:   Diagnosis Date    Anxiety     Depression     Diabetes mellitus     DKA (diabetic ketoacidoses)     Glaucoma     Hyperlipemia     Hypertension     Seizures      History reviewed. No pertinent surgical history.  History reviewed. No pertinent family history.  Social History     Tobacco Use    Smoking status: Never Smoker    Smokeless tobacco: Never Used   Substance Use Topics    Alcohol use: No    Drug use: No     Review of Systems   Constitutional: Positive for activity change, appetite change and fatigue. Negative for chills and fever.   HENT: Negative for congestion, rhinorrhea, sinus pressure, sinus pain and sore throat.    Eyes: Negative for pain and visual disturbance.   Respiratory: Negative for cough, chest tightness and shortness of breath.    Cardiovascular: Negative for chest pain, palpitations and leg swelling.   Gastrointestinal: Positive for nausea (last night) and vomiting (last night). Negative for abdominal  distention, abdominal pain and diarrhea.   Genitourinary: Negative for dysuria, flank pain and frequency.   Musculoskeletal: Negative for arthralgias, back pain, myalgias, neck pain and neck stiffness.   Skin: Negative for pallor and rash.   Neurological: Positive for weakness and light-headedness. Negative for seizures, syncope, facial asymmetry, speech difficulty, numbness and headaches.   Psychiatric/Behavioral: Negative for confusion.       Physical Exam     Initial Vitals [01/30/19 1057]   BP Pulse Resp Temp SpO2   (!) 80/52 99 18 97.5 °F (36.4 °C) 99 %      MAP       --         Physical Exam    Constitutional: He appears well-developed. He is cooperative. He has a sickly appearance. No distress.   HENT:   Head: Normocephalic and atraumatic.   Eyes: Conjunctivae and EOM are normal. Pupils are equal, round, and reactive to light.   Neck: Normal range of motion. Neck supple.   Cardiovascular: Normal rate. Exam reveals no gallop and no friction rub.    No murmur heard.  Pulmonary/Chest: Effort normal. No respiratory distress. He has no wheezes. He has no rhonchi. He has no rales.   Abdominal: Soft. There is no tenderness. There is no rigidity, no rebound, no guarding and no CVA tenderness.   Musculoskeletal: Normal range of motion.   Neurological: He is alert and oriented to person, place, and time. No cranial nerve deficit or sensory deficit.   Skin: Skin is warm and dry.   Pt has multiple wounds on BLE that are being followed by wound care. Wounds do not appear to be infected.   Psychiatric: He has a normal mood and affect. His speech is normal and behavior is normal.         ED Course   Procedures  Labs Reviewed   CBC W/ AUTO DIFFERENTIAL - Abnormal; Notable for the following components:       Result Value    WBC 23.27 (*)     RBC 4.46 (*)     Hemoglobin 11.2 (*)     Hematocrit 36.7 (*)     MCH 25.1 (*)     MCHC 30.5 (*)     Platelets 504 (*)     Immature Granulocytes 0.6 (*)     Gran # (ANC) 18.8 (*)      Immature Grans (Abs) 0.14 (*)     Mono # 2.0 (*)     Gran% 80.9 (*)     Lymph% 9.3 (*)     All other components within normal limits   COMPREHENSIVE METABOLIC PANEL - Abnormal; Notable for the following components:    Sodium 135 (*)     Chloride 93 (*)     CO2 32 (*)     Glucose 62 (*)     Total Protein 8.5 (*)     Albumin 2.3 (*)     All other components within normal limits   URINALYSIS, REFLEX TO URINE CULTURE - Abnormal; Notable for the following components:    Appearance, UA Hazy (*)     Protein, UA 2+ (*)     Glucose, UA 3+ (*)     Ketones, UA Trace (*)     All other components within normal limits    Narrative:     Preferred Collection Type->Urine, Clean Catch   URINALYSIS MICROSCOPIC - Abnormal; Notable for the following components:    Bacteria, UA Few (*)     Hyaline Casts, UA 13 (*)     All other components within normal limits    Narrative:     Preferred Collection Type->Urine, Clean Catch   HEMOGLOBIN A1C - Abnormal; Notable for the following components:    Hemoglobin A1C >14.0 (*)     All other components within normal limits    Narrative:     add on [facility] ER #[id number] per Bandar Brennan MD @ 17:08  01/30/2019    POCT GLUCOSE - Abnormal; Notable for the following components:    POCT Glucose 124 (*)     All other components within normal limits   CULTURE, BLOOD   CULTURE, BLOOD   LACTIC ACID, PLASMA   BETA - HYDROXYBUTYRATE, SERUM   HEMOGLOBIN A1C   BETA - HYDROXYBUTYRATE, SERUM   POCT GLUCOSE   POCT GLUCOSE MONITORING CONTINUOUS   POCT GLUCOSE MONITORING CONTINUOUS        ECG Results          EKG 12-lead (Final result)  Result time 01/30/19 14:55:18    Final result by Interface, Lab In [facility] (01/30/19 14:55:18)                 Narrative:    Test Reason : R53.83,    Vent. Rate : 098 BPM     Atrial Rate : 098 BPM     P-R Int : 136 ms          QRS Dur : 104 ms      QT Int : 376 ms       P-R-T Axes : 073 080 017 degrees     QTc Int : 480 ms    Normal sinus rhythm  Minimal voltage criteria for LVH, may be  normal variant  Borderline Abnormal ECG  When compared with ECG of 25-JAN-2019 13:52,  No significant change was found  Confirmed by Jacqueline Alston MD ([id number]) on 1/30/2019 2:55:08 PM    Referred By: [email or web address]   SELF           Confirmed By:Jacquleine Alston MD                            Imaging Results          X-Ray Chest PA And Lateral (Final result)  Result time 01/30/19 14:34:43    Final result by Kody Aiken MD (01/30/19 14:34:43)                 Impression:      No acute abnormality.      Electronically signed by: Kody Aiken MD  Date:    01/30/2019  Time:    14:34             Narrative:    EXAMINATION:  XR CHEST PA AND LATERAL    CLINICAL HISTORY:  Weakness    TECHNIQUE:  PA and lateral views of the chest were performed.    COMPARISON:  1/25/2019    FINDINGS:  The lungs are clear, with normal appearance of pulmonary vasculature and no pleural effusion or pneumothorax.    The cardiac silhouette is normal in size. The hilar and mediastinal contours are unremarkable.    Bones are intact.                                 Medical Decision Making:   History:   Old Medical Records: I decided to obtain old medical records.  Initial Assessment:   Mr Field is a 40 yo  male patient with PMHx of HTN, HLD, IDDM, seizure disorder, depression , and developmental delay that presents to the ED with three day history of fatigue, light-headedness upon standing with three episodes of vomiting last night. Pt hypotensive at 80/52 in triage on arrival to ED. Pt seen in ED four days ago for hyperglycemia and BRYSON. Pt's family states that in the last three days he has been sleeping a lot and not properly eating and drinking as he should. Reports compliance with his medications. Pt currently complaining of light-headedness, weakness and fatigue.   Clinical Tests:   Lab Tests: Ordered and Reviewed  Radiological Study: Ordered and Reviewed  Medical Tests: Ordered and Reviewed  ED Management:  Pt hypotensive at 80/52 on  arrival to ED. Labs, 12 lead, CXR ordered. Pt given 1L NS bolus. POC glucose 71. BP improved with fluids. Pt able to eat and drink so patient given crispin crackers and juice. Labs reveal WBC of 23K. Pt's BP dropped back down to 94/59. Additional NS bolus given. CXR reveals no acute process. UA shows few bacteria. Plan is to place patient in observation for further evaluation of leukocytosis and hypotension. Results and plan explained to patient who is understanding and agreeable with plan. Discussed the patient with [facility] and patient placed in observation for further treatment and evaluation.                    ED Course as of Jan 30 1844 Wed Jan 30, 2019   1259 EKG interpretation by ED physician:  Normal sinus rhythm, rate 98, nonspecific T-wave changes inferior and lateral leads, no ST elevations or other signs of ischemia, normal intervals.  Grossly stable from prior EKG 01/25/2019. EKG 12-lead [SS]      ED Course User Index  [SS] Jasvir Lopez MD     Clinical Impression:   The primary encounter diagnosis was Leukocytosis, unspecified type. Diagnoses of Fatigue, Weakness generalized, and Hypotension, unspecified hypotension type were also pertinent to this visit.      Disposition:   Disposition: Placed in Observation  Condition: Stable                        Woo Jackson PA-C  01/30/19 2020

## 2019-01-30 NOTE — ASSESSMENT & PLAN NOTE
Fatigue of 2 days duration, with vomitx3, lightheadedness, dizziness and 1 fall.   Pt takes 15U aspart TIDWM and 48U long acting insulin  Recent ED visit for similar presentation, Glu >500 at that time, given insulin and IVF and discharged  Given 2L IVF in ED with improvement of BP  Obtain BH4

## 2019-01-31 PROBLEM — R23.9 ALTERATION IN SKIN INTEGRITY: Status: ACTIVE | Noted: 2019-01-31

## 2019-01-31 LAB
ANION GAP SERPL CALC-SCNC: 10 MMOL/L
BASOPHILS # BLD AUTO: 0.04 K/UL
BASOPHILS NFR BLD: 0.3 %
BUN SERPL-MCNC: 13 MG/DL
CALCIUM SERPL-MCNC: 9.4 MG/DL
CHLORIDE SERPL-SCNC: 101 MMOL/L
CO2 SERPL-SCNC: 28 MMOL/L
CREAT SERPL-MCNC: 0.8 MG/DL
DIFFERENTIAL METHOD: ABNORMAL
EOSINOPHIL # BLD AUTO: 0.2 K/UL
EOSINOPHIL NFR BLD: 1.1 %
ERYTHROCYTE [DISTWIDTH] IN BLOOD BY AUTOMATED COUNT: 13.3 %
EST. GFR  (AFRICAN AMERICAN): >60 ML/MIN/1.73 M^2
EST. GFR  (NON AFRICAN AMERICAN): >60 ML/MIN/1.73 M^2
GLUCOSE SERPL-MCNC: 79 MG/DL
HCT VFR BLD AUTO: 33.5 %
HGB BLD-MCNC: 10.1 G/DL
IMM GRANULOCYTES # BLD AUTO: 0.09 K/UL
IMM GRANULOCYTES NFR BLD AUTO: 0.6 %
LYMPHOCYTES # BLD AUTO: 2.2 K/UL
LYMPHOCYTES NFR BLD: 13.4 %
MCH RBC QN AUTO: 25.1 PG
MCHC RBC AUTO-ENTMCNC: 30.1 G/DL
MCV RBC AUTO: 83 FL
MONOCYTES # BLD AUTO: 1.2 K/UL
MONOCYTES NFR BLD: 7.6 %
NEUTROPHILS # BLD AUTO: 12.3 K/UL
NEUTROPHILS NFR BLD: 77 %
NRBC BLD-RTO: 0 /100 WBC
PLATELET # BLD AUTO: 482 K/UL
PMV BLD AUTO: 11.3 FL
POCT GLUCOSE: 124 MG/DL (ref 70–110)
POCT GLUCOSE: 176 MG/DL (ref 70–110)
POCT GLUCOSE: 196 MG/DL (ref 70–110)
POCT GLUCOSE: 63 MG/DL (ref 70–110)
POCT GLUCOSE: 83 MG/DL (ref 70–110)
POTASSIUM SERPL-SCNC: 3.4 MMOL/L
RBC # BLD AUTO: 4.03 M/UL
SODIUM SERPL-SCNC: 139 MMOL/L
WBC # BLD AUTO: 15.99 K/UL

## 2019-01-31 PROCEDURE — S5571 INSULIN DISPOS PEN 3 ML: HCPCS | Performed by: STUDENT IN AN ORGANIZED HEALTH CARE EDUCATION/TRAINING PROGRAM

## 2019-01-31 PROCEDURE — 80048 BASIC METABOLIC PNL TOTAL CA: CPT

## 2019-01-31 PROCEDURE — 25000003 PHARM REV CODE 250: Performed by: STUDENT IN AN ORGANIZED HEALTH CARE EDUCATION/TRAINING PROGRAM

## 2019-01-31 PROCEDURE — 63600175 PHARM REV CODE 636 W HCPCS: Performed by: STUDENT IN AN ORGANIZED HEALTH CARE EDUCATION/TRAINING PROGRAM

## 2019-01-31 PROCEDURE — 97161 PT EVAL LOW COMPLEX 20 MIN: CPT

## 2019-01-31 PROCEDURE — 99225 PR SUBSEQUENT OBSERVATION CARE,LEVEL II: ICD-10-PCS | Mod: GC,,, | Performed by: STUDENT IN AN ORGANIZED HEALTH CARE EDUCATION/TRAINING PROGRAM

## 2019-01-31 PROCEDURE — 99225 PR SUBSEQUENT OBSERVATION CARE,LEVEL II: CPT | Mod: GC,,, | Performed by: STUDENT IN AN ORGANIZED HEALTH CARE EDUCATION/TRAINING PROGRAM

## 2019-01-31 PROCEDURE — G0378 HOSPITAL OBSERVATION PER HR: HCPCS

## 2019-01-31 PROCEDURE — 36415 COLL VENOUS BLD VENIPUNCTURE: CPT

## 2019-01-31 PROCEDURE — 85025 COMPLETE CBC W/AUTO DIFF WBC: CPT

## 2019-01-31 RX ORDER — LISINOPRIL 2.5 MG/1
2.5 TABLET ORAL DAILY
Status: ON HOLD | COMMUNITY
End: 2019-02-01 | Stop reason: HOSPADM

## 2019-01-31 RX ORDER — CITALOPRAM 40 MG/1
40 TABLET, FILM COATED ORAL DAILY
Status: ON HOLD | COMMUNITY
End: 2019-02-01 | Stop reason: HOSPADM

## 2019-01-31 RX ORDER — INSULIN ASPART 100 [IU]/ML
8 INJECTION, SOLUTION INTRAVENOUS; SUBCUTANEOUS
Status: DISCONTINUED | OUTPATIENT
Start: 2019-01-31 | End: 2019-02-01 | Stop reason: HOSPADM

## 2019-01-31 RX ADMIN — CARBAMAZEPINE 400 MG: 200 TABLET ORAL at 10:01

## 2019-01-31 RX ADMIN — CARBAMAZEPINE 400 MG: 200 TABLET ORAL at 08:01

## 2019-01-31 RX ADMIN — INSULIN ASPART 8 UNITS: 100 INJECTION, SOLUTION INTRAVENOUS; SUBCUTANEOUS at 05:01

## 2019-01-31 RX ADMIN — INSULIN DETEMIR 25 UNITS: 100 INJECTION, SOLUTION SUBCUTANEOUS at 10:01

## 2019-01-31 RX ADMIN — VENLAFAXINE HYDROCHLORIDE 75 MG: 75 CAPSULE, EXTENDED RELEASE ORAL at 08:01

## 2019-01-31 RX ADMIN — INSULIN ASPART 8 UNITS: 100 INJECTION, SOLUTION INTRAVENOUS; SUBCUTANEOUS at 12:01

## 2019-01-31 RX ADMIN — ATORVASTATIN CALCIUM 80 MG: 20 TABLET, FILM COATED ORAL at 08:01

## 2019-01-31 RX ADMIN — ASPIRIN 81 MG: 81 TABLET, COATED ORAL at 08:01

## 2019-01-31 NOTE — ASSESSMENT & PLAN NOTE
WBC 23 on admission, afebrile  UA indicative of diabetic nephropathy, no signs of infection  Blood cultures NGTD  Will hold off on antibiotic therapy at this time as low suspicion for sepsis

## 2019-01-31 NOTE — PLAN OF CARE
Problem: Adult Inpatient Plan of Care  Goal: Plan of Care Review  Outcome: Ongoing (interventions implemented as appropriate)  Pt AAOx4. POC reviewed with pt. Pt verbalizes understanding of plan of care. Pt remained free of injury/trauma throughout the shift. Safety precautions maintained. Bedrails upx2, bed in lowest position and locked. Call bell in reach at all times. Pt denied pain throughout the shift. Blood glucose monitored throughout the shift. Pt able to ambulate and reposition self in bed. No acute events throughout the shift.

## 2019-01-31 NOTE — PROGRESS NOTES
Food & Nutrition  Education    Diet Education: Elevated A1C  Time Spent: 15 min  Learners: Patient      Nutrition Education provided with handouts:   Meal planning using the plate method    Comments:  Educated pt on moderating cho choices and building a better plate.   All questions and concerns answered. Dietitian's contact information provided.       Follow-Up:  No pt to d/c  Please Re-consult as needed      Thanks!  Jayson Hooper RDN

## 2019-01-31 NOTE — HPI
Mr Field is a 40 yo  male patient with PMHx of HTN, HLD, IDDM, seizure disorder, depression , and developmental delay that presents to the ED with three day history of fatigue, light-headedness upon standing. Patient noted to have bilateral lower extremity wounds and similar wounds on the left elbow. Dermatology consulted for evaluation.     Difficult to obtain accurate patient history. Per patient, lesions started as bug bites on his lower legs several months ago. Patient reports constantly scratching and picking at the lesions. He currently goes to wound care every other week as an outpatient, but states he has difficulty bandaging the wounds at home and they are often exposed. During interview, patient was scratching at lesions on left elbow.

## 2019-01-31 NOTE — ASSESSMENT & PLAN NOTE
Patient with chronic, non-healing wounds of bilateral lower extremities and left extensor elbow. Per patient lesions started as pruritic bug bites. No primary lesions evident today, but current presentation indicative of factitial ulcers caused by frequent patient manipulation. During interview/exam, patient picked at lesions multiple times supporting diagnosis of factitial ulcerations.   - Lesions do not appear infected   - No primary lesions that could help identify initial cause of itch. DDx includes bites vs pruritus secondary to diabetic neuropathy  - Counseled patient to avoid picking/scratching, though this will likely be difficult for patient given developmental delay   - Continue wound care as an outpatient. Keep lesions covered when possible to help discourage manipulation.

## 2019-01-31 NOTE — ASSESSMENT & PLAN NOTE
Pt takes 15U aspart TIDWM and 48U detemir  Will start on 8U aspart TIDWM and 25U detemir QHS   BH4 normal, A1C >14%

## 2019-01-31 NOTE — H&P
[facility] Hospital Medicine  History & Physical    Patient Name: Pavel Field  MRN: [id number]  Admission Date: 1/30/2019  Attending Physician: Humza Ortiz MD   Primary Care Provider: Ingrid Madrigal MD    [facility] Medicine Team: [facility]OSP MED 5 Bandar Brennan MD     Patient information was obtained from patient, caregiver / friend and ER records.     Subjective:     Principal Problem:Fatigue    Chief Complaint:   Chief Complaint   Patient presents with    Fatigue     diabetic been feeling weak since yest,         HPI: 38 yo M with background of HTn, HLD, IDDM (diagnosed at age 23 with A1C >20), depression, seizure, developmental delay presenting for 2 day history of fatigue and weakness. Patient has been feeling weak and dizzy for 2 days, experienced an unwitnessed fall without loss of consciousness and hit his head without LOC or apparent injury. Pt had 3 episodes of vomiting last night and was unable to keep any food down, but was able to tolerate breakfast this morning. Pt continued to feel weak and attempted to feel well as he went to work, but left to present to ED due to excessive weakness. Pt had a similar presentation to ED 4 days ago with nausea, vomiting, weakness and Glu >500, he was given insulin and IVF and discharged. Pt today had Glu of 60 and hypotensive into 80s systolic. Pt denies diarrhea, vomiting today, abdominal pain, fevers, CP or SOB. Pt sees wound care every other week for lower leg wounds that do not heal properly.     Furthermore, patient reports that he has had multiple falls over the last few months. Pt reports that he collapses, without tripping, due to his weakness. He denies losing consciousness during these falls. He reports that his last seizure was in 1996 and he is compliant with his medication. Pt reports taking 48U of detemir nightly, 15U lantus TIDWM and glimiperide.     Pt received 2L IVF in ED and some food, no longer reporting nausea  "or weakness.     Past Medical History:   Diagnosis Date    Anxiety     Depression     Diabetes mellitus     DKA (diabetic ketoacidoses)     Glaucoma     Hyperlipemia     Hypertension     Seizures        History reviewed. No pertinent surgical history.    Review of patient's allergies indicates:  No Known Allergies    No current facility-administered medications on file prior to encounter.      Current Outpatient Medications on File Prior to Encounter   Medication Sig    aspirin (ECOTRIN) 81 MG EC tablet Take 1 tablet by mouth once daily.    atorvastatin (LIPITOR) 80 MG tablet Take 80 mg by mouth once daily.    BASAGLAR KWIKPEN U-100 INSULIN 100 unit/mL (3 mL) InPn pen Inject 48 Units into the skin every evening.     carBAMazepine (TEGRETOL) 200 mg tablet Take 400 mg by mouth 2 (two) times daily.    glimepiride (AMARYL) 4 MG tablet Take 1 tablet by mouth once daily.    insulin aspart U-100 (NOVOLOG) 100 unit/mL InPn pen Inject 15 Units into the skin 3 (three) times daily with meals.     venlafaxine (EFFEXOR-XR) 75 MG 24 hr capsule Take 1 capsule by mouth once daily.    BD ULTRA-FINE BRUNILDA PEN NEEDLE 32 gauge x 5/32" Ndle Inject 1 Units into the skin 4 (four) times daily.    olmesartan (BENICAR) 20 MG tablet Take 0.5 tablets (10 mg total) by mouth once daily. Please contact primary care provider for refills.HOLD UNTIL YOUR NEXT PRIMARY CARE APPT.    timolol maleate 0.5% (TIMOPTIC) 0.5 % Drop INSTILL ONE DROP IN OU BID     Family History     None        Tobacco Use    Smoking status: Never Smoker    Smokeless tobacco: Never Used   Substance and Sexual Activity    Alcohol use: No    Drug use: No    Sexual activity: Not on file     Review of Systems   Constitutional: Positive for fatigue. Negative for appetite change, chills and fever.   Respiratory: Negative for cough and shortness of breath.    Cardiovascular: Negative for chest pain and leg swelling.   Gastrointestinal: Positive for nausea and " vomiting. Negative for abdominal distention and abdominal pain.   Genitourinary: Negative for difficulty urinating, dysuria, flank pain and frequency.   Musculoskeletal: Negative.    Skin: Positive for wound (L elbow and bilateral LL).   Neurological: Positive for dizziness, syncope, weakness and light-headedness. Negative for seizures and speech difficulty.     Objective:     Vital Signs (Most Recent):  Temp: 97.8 °F (36.6 °C) (01/30/19 1427)  Pulse: 88 (01/30/19 1658)  Resp: 18 (01/30/19 1057)  BP: 114/79 (01/30/19 1658)  SpO2: 100 % (01/30/19 1658) Vital Signs (24h Range):  Temp:  [97.5 °F (36.4 °C)-97.9 °F (36.6 °C)] 97.8 °F (36.6 °C)  Pulse:  [80-99] 88  Resp:  [18] 18  SpO2:  [99 %-100 %] 100 %  BP: ()/(52-79) 114/79     Weight: 59 kg (130 lb)  Body mass index is 19.2 kg/m².    Physical Exam   Constitutional: He is oriented to person, place, and time. No distress.   Thin-disheveled appearing male   Eyes: Conjunctivae are normal. Pupils are equal, round, and reactive to light.   Cardiovascular: Normal rate, regular rhythm and normal heart sounds.   Pulmonary/Chest: Effort normal and breath sounds normal.   Abdominal: Soft. There is no tenderness.   Musculoskeletal: He exhibits no edema or tenderness.   Neurological: He is alert and oriented to person, place, and time.   Skin: He is not diaphoretic.   Bilateral LL wounds, not completely healed, not fully healed. L elbow abrasions, healing.   Nursing note and vitals reviewed.        CRANIAL NERVES     CN III, IV, VI   Pupils are equal, round, and reactive to light.       Significant Labs:   CBC:   Recent Labs   Lab 01/30/19  1215   WBC 23.27*   HGB 11.2*   HCT 36.7*   *       Significant Imaging: I have reviewed all pertinent imaging results/findings within the past 24 hours.    Assessment/Plan:     * Fatigue    Fatigue of 2 days duration, with vomitx3, lightheadedness, dizziness and 1 fall.   Pt takes 15U aspart TIDWM and 48U long acting  insulin  Recent ED visit for similar presentation, Glu >500 at that time, given insulin and IVF and discharged  Given 2L IVF in ED with improvement of BP  Obtain BH4           Leukocytosis    WBC 23 on admission, afebrile  Obtain UA and blood cultures  Will hold off on antibiotic therapy at this time as low suspicion for sepsis       Type 2 diabetes mellitus with hyperglycemia, with long-term current use of insulin    Pt takes 15U aspart TIDWM and 48U detemir  Will start on 6U aspart TIDWM and 20U detemir QHS   Obtain BH4     Essential hypertension    Was previously on olmesartan, but held on previous discharge, to be reassessed by PCP for need   Will continue to hold in setting of hypotension   Likely will not need going forward       Seizure disorder    Continue home carbamazepine       Depression    Continue home venlafaxine     Developmental disability           Orthostatic hypotension    Pt hypotensive on presentation, given 2L IVF and became normotensive  Will obtain orthostatic vitals         VTE Risk Mitigation (From admission, onward)        Ordered     IP VTE LOW RISK PATIENT  Once      01/30/19 1645     Place ROSHNI hose  Until discontinued      01/30/19 1645     Place sequential compression device  Until discontinued      01/30/19 1645             Bandar Brennan MD  Department of Hospital Medicine   [facility]

## 2019-01-31 NOTE — ED NOTES
Pt. Is alert and awake upon departure to room; no acute distress noted; respirations are even and unlabored

## 2019-01-31 NOTE — PROGRESS NOTES
[facility] Hospital Medicine  Progress Note    Patient Name: Pavel Field  MRN: [id number]  Patient Class: OP- Observation   Admission Date: 1/30/2019  Length of Stay: 0 days  Attending Physician: Humza Ortiz MD  Primary Care Provider: Ingrid Madrigal MD    [facility] Medicine Team: [facility] HOSP MED 5 Bandar Brennan MD    Subjective:     Principal Problem:Fatigue    HPI:  38 yo M with background of HTn, HLD, IDDM (diagnosed at age 23 with A1C >20), depression, seizure, developmental delay presenting for 2 day history of fatigue and weakness. Patient has been feeling weak and dizzy for 2 days, experienced an unwitnessed fall without loss of consciousness and hit his head without LOC or apparent injury. Pt had 3 episodes of vomiting last night and was unable to keep any food down, but was able to tolerate breakfast this morning. Pt continued to feel weak and attempted to feel well as he went to work, but left to present to ED due to excessive weakness. Pt had a similar presentation to ED 4 days ago with nausea, vomiting, weakness and Glu >500, he was given insulin and IVF and discharged. Pt today had Glu of 60 and hypotensive into 80s systolic. Pt denies diarrhea, vomiting today, abdominal pain, fevers, CP or SOB. Pt sees wound care every other week for lower leg wounds that do not heal properly.     Furthermore, patient reports that he has had multiple falls over the last few months. Pt reports that he collapses, without tripping, due to his weakness. He denies losing consciousness during these falls. He reports that his last seizure was in 1996 and he is compliant with his medication. Pt reports taking 48U of detemir nightly, 15U lantus TIDWM and glimiperide.     Pt received 2L IVF in ED and some food, no longer reporting nausea or weakness.     Hospital Course:  01/31/2019 SINAN.     Interval History: See hospital course    Review of Systems  Objective:     Vital Signs (Most  Recent):  Temp: 98.8 °F (37.1 °C) (01/31/19 1205)  Pulse: 93 (01/31/19 1205)  Resp: 18 (01/31/19 1205)  BP: (!) 140/88 (01/31/19 1205)  SpO2: 100 % (01/31/19 1205) Vital Signs (24h Range):  Temp:  [97.3 °F (36.3 °C)-98.8 °F (37.1 °C)] 98.8 °F (37.1 °C)  Pulse:  [80-96] 93  Resp:  [14-18] 18  SpO2:  [98 %-100 %] 100 %  BP: ()/(59-97) 140/88     Weight: 52.1 kg (114 lb 13.8 oz)  Body mass index is 16.96 kg/m².    Intake/Output Summary (Last 24 hours) at 1/31/2019 1317  Last data filed at 1/30/2019 1957  Gross per 24 hour   Intake 1000 ml   Output --   Net 1000 ml      Physical Exam   Constitutional: He is oriented to person, place, and time. He appears well-developed.   Thin male   HENT:   Head: Normocephalic and atraumatic.   Eyes: Pupils are equal, round, and reactive to light.   Cardiovascular: Normal rate, regular rhythm and normal heart sounds.   Pulmonary/Chest: Effort normal and breath sounds normal.   Abdominal: Soft. There is no tenderness.   Musculoskeletal: He exhibits no edema or tenderness.   Neurological: He is alert and oriented to person, place, and time.   Skin:   Lower leg wounds, not fully healed with excoriations   Nursing note and vitals reviewed.      Significant Labs:   CBC:   Recent Labs   Lab 01/30/19 1215 01/31/19  0511   WBC 23.27* 15.99*   HGB 11.2* 10.1*   HCT 36.7* 33.5*   * 482*     CMP:   Recent Labs   Lab 01/30/19  1215 01/31/19  0511   * 139   K 3.5 3.4*   CL 93* 101   CO2 32* 28   GLU 62* 79   BUN 18 13   CREATININE 1.2 0.8   CALCIUM 10.0 9.4   PROT 8.5*  --    ALBUMIN 2.3*  --    BILITOT 0.1  --    ALKPHOS 135  --    AST 14  --    ALT 16  --    ANIONGAP 10 10   EGFRNONAA >60.0 >60.0       Significant Imaging: I have reviewed all pertinent imaging results/findings within the past 24 hours.    Assessment/Plan:      * Fatigue    Fatigue of 2 days duration, with vomitx3, lightheadedness, dizziness and 1 fall.   Pt takes 15U aspart TIDWM and 48U long acting  insulin  Recent ED visit for similar presentation, Glu >500 at that time, given insulin and IVF and discharged  Given 2L IVF in ED with improvement of BP  BH4 normal           Leukocytosis    WBC 23 on admission, afebrile  UA indicative of diabetic nephropathy, no signs of infection  Blood cultures NGTD  Will hold off on antibiotic therapy at this time as low suspicion for sepsis       Type 2 diabetes mellitus with hyperglycemia, with long-term current use of insulin    Pt takes 15U aspart TIDWM and 48U detemir  Will start on 8U aspart TIDWM and 25U detemir QHS   BH4 normal, A1C >14%     Essential hypertension    Was previously on olmesartan, but held on previous discharge, to be reassessed by PCP for need   Will continue to hold in setting of hypotension   Likely will not need going forward       Seizure disorder    Continue home carbamazepine       Depression    Continue home venlafaxine     Alteration in skin integrity    Wound care consulted and following  Dermatology consulted     Developmental disability           Orthostatic hypotension    Pt hypotensive on presentation, given 2L IVF and became normotensive  Will obtain orthostatic vitals         VTE Risk Mitigation (From admission, onward)        Ordered     IP VTE LOW RISK PATIENT  Once      01/30/19 1645     Place ROSHNI hose  Until discontinued      01/30/19 1645     Place sequential compression device  Until discontinued      01/30/19 1645              Bandar Brennan MD  Department of Hospital Medicine   [facility]

## 2019-01-31 NOTE — NURSING
CBG=63. Pt asymptomatic at this time. Pt waiting on breakfast tray and refusing glucose tabs at this time.

## 2019-01-31 NOTE — PLAN OF CARE
PCP TANK ARIAS  PHARMACY [facility] ON [address]  MOTHER THOMAS TUCKER      01/31/19 1035   Discharge Assessment   Assessment Type Discharge Planning Assessment   Confirmed/corrected address and phone number on facesheet? Yes   Assessment information obtained from? Patient   Expected Length of Stay (days) 3   Communicated expected length of stay with patient/caregiver no   Prior to hospitilization cognitive status: Unable to Assess   Prior to hospitalization functional status: Independent   Current cognitive status: Unable to Assess   Current Functional Status: Independent   Lives With alone   Able to Return to Prior Arrangements yes   Is patient able to care for self after discharge? Unable to determine at this time (comments)   Patient's perception of discharge disposition home or selfcare   Readmission Within the Last 30 Days no previous admission in last 30 days   Patient currently being followed by outpatient case management? Unable to determine (comments)   Patient currently receives any other outside agency services? Yes   Equipment Currently Used at Home none   Do you have any problems affording any of your prescribed medications? No   Is the patient taking medications as prescribed? no   Does the patient have transportation home? No   Does the patient receive services at the [facility]? No   Discharge Plan A Home;Home Health   Discharge Plan B Home;Home Health   DME Needed Upon Discharge  none   Patient/Family in Agreement with Plan unable to assess

## 2019-01-31 NOTE — PT/OT/SLP EVAL
Physical Therapy Evaluation and Discharge Note    Patient Name:  Pavel Field   MRN:  [id number]    Recommendations:     Discharge Recommendations:  (home)   Discharge Equipment Recommendations: none   Barriers to discharge: None    Assessment:     Pavel Field is a 39 y.o. male admitted with a medical diagnosis of Fatigue. .  At this time, patient is functioning at their prior level of function and does not require further acute PT services.     Recent Surgery: * No surgery found *      Plan:     During this hospitalization, patient does not require further acute PT services.  Please re-consult if situation changes.      Subjective     Chief Complaint: none; pt very pleasant  Patient/Family Comments/goals: to return home and go back to work    Pain/Comfort:  · Pain Rating 1: 0/10  · Pain Rating Post-Intervention 1: 0/10  · Pain Rating Post-Intervention 2: 0/10    Patients cultural, spiritual, Christian conflicts given the current situation: no    Living Environment:  Pt lives on the first floor of his apt alone.   Prior to admission, patients level of function was ( I), taking public transportation and working part time as a .  Equipment used at home: none.  DME owned (not currently used): none.  Upon discharge, patient will have assistance from family if needed. .    Objective:     Communicated with RN  prior to session.  Patient found supine upon PT entry to room found with: telemetry     General Precautions: Standard, fall   Orthopedic Precautions:N/A   Braces: N/A     Exams:  · Cognitive Exam:  Patient is oriented to Person, Place, Time and Situation  · Fine Motor Coordination: -       Intact  · Gross Motor Coordination:  WFL  · Postural Exam:  Patient presented with the following abnormalities: -       No postural abnormalities identified  · Sensation: -       Intact  · Skin Integrity/Edema:  -       Skin integrity: Visible skin intact with B post calfs wrapped with gauze; no saturation noted  · RLE  ROM: WFL  · RLE Strength: WFL  · LLE ROM: WFL  · LLE Strength: WFL    Functional Mobility:  · Bed Mobility:  Rolling Left:  independence  · Rolling Right: independence  · Supine to Sit: independence  · Sit to Supine: independence  · Transfers:  Sit to Stand:  independence with no AD  · Gait: x > 350 feet ( I) with no AD  · Balance: ( I) for gait    AM-PAC 6 CLICK MOBILITY  Total Score:24       Therapeutic Activities and Exercises:  Pt able to don sweatpants prior to gait with no A required       Patient education  · Patient educated on the role of PT and POC  · Patient educated on importance  activity while in the hosptial per tolerance for improved endurance and to limit deconditioning   · Patient educated on safe transfers with nursing as appropriate  · Patient educated on energy conservation, pursed lip breathing  · Patient educated on proper transfer mechanics and safety  · All of patients questions were answered within the scope of PT        AM-PAC 6 CLICK MOBILITY  Total Score:24     Patient left HOB elevated with all lines intact and call button in reach.    GOALS:   Multidisciplinary Problems     Physical Therapy Goals     Not on file          Multidisciplinary Problems (Resolved)        Problem: Physical Therapy Goal    Goal Priority Disciplines Outcome Goal Variances Interventions   Physical Therapy Goal   (Resolved)     PT, PT/OT Outcome(s) achieved                     History:     Past Medical History:   Diagnosis Date    Anxiety     Depression     Diabetes mellitus     DKA (diabetic ketoacidoses)     Glaucoma     Hyperlipemia     Hypertension     Seizures        History reviewed. No pertinent surgical history.    Clinical Decision Making:     History  Co-morbidities and personal factors that may impact the plan of care Examination  Body Structures and Functions, activity limitations and participation restrictions that may impact the plan of care Clinical Presentation   Decision Making/  Complexity Score   Co-morbidities:   [] Time since onset of injury / illness / exacerbation  [] Status of current condition  []Patient's cognitive status and safety concerns    [x] Multiple Medical Problems (see med hx)  Personal Factors:   [] Patient's age  [] Prior Level of function   [] Patient's home situation (environment and family support)  [] Patient's level of motivation  [] Expected progression of patient      HISTORY:(criteria)    [x] 72505 - no personal factors/history    [] 75986 - has 1-2 personal factor/comorbidity     [] 15685 - has >3 personal factor/comorbidity     Body Regions:  [] Objective examination findings  [] Head     []  Neck  [] Trunk   [] Upper Extremity  [] Lower Extremity    Body Systems:  [] For communication ability, affect, cognition, language, and learning style: the assessment of the ability to make needs known, consciousness, orientation (person, place, and time), expected emotional /behavioral responses, and learning preferences (eg, learning barriers, education  needs)  [] For the neuromuscular system: a general assessment of gross coordinated movement (eg, balance, gait, locomotion, transfers, and transitions) and motor function  (motor control and motor learning)  [] For the musculoskeletal system: the assessment of gross symmetry, gross range of motion, gross strength, height, and weight  [] For the integumentary system: the assessment of pliability(texture), presence of scar formation, skin color, and skin integrity  [] For cardiovascular/pulmonary system: the assessment of heart rate, respiratory rate, blood pressure, and edema     Activity limitations:    [] Patient's cognitive status and saf ety concerns          [] Status of current condition      [] Weight bearing restriction  [] Cardiopulmunary Restriction    Participation Restrictions:   [] Goals and goal agreement with the patient     [] Rehab potential (prognosis) and probable outcome      Examination of Body  System: (criteria)    [] 26101 - addressing 1-2 elements    [] 31829 - addressing a total of 3 or more elements     [] 37733 -  Addressing a total of 4 or more elements         Clinical Presentation: (criteria)  Stable - 91668     On examination of body system using standardized tests and measures patient presents with 1-2 elements from any of the following: body structures and functions, activity limitations, and/or participation restrictions.  Leading to a clinical presentation that is considered stable and/or uncomplicated                              Clinical Decision Making  (Eval Complexity):  Low- 53146     Time Tracking:     PT Received On: 01/31/19  PT Start Time: 1032     PT Stop Time: 1040  PT Total Time (min): 8 min     Billable Minutes: Evaluation 8 min      Godfrey Khanna PT  01/31/2019

## 2019-01-31 NOTE — PROGRESS NOTES
Wound care consult to wounds to the BLEs and left elbow.    PMH: Hga1c >14    Patient seen on the 3rd floor. He states he has been itching and causing wounds for about a week. The BLEs have multiple areas of full thickness wounds with excoriation from the ankle to the knee in multiple stages of healing. It appears these wounds have been there for a significant amount of time as there are multiple well healed/scarred areas.   Wounds cleansed and thick layer of sween (pink top moisturizer) to the legs, covered with ABD and kerlex.   Elbow wound appears similar to legs. Wound cleansed and covered with foam.    Recommend:  Cleansing legs daily and cover with thick layer of sween (pink top), abd and kerlex  Dermatology consult to r/out underlying skin condition.     Wound care to follow PRN.   Tiff Flores RN [facility]   [phone]             01/31/19 0933       Wound lower Leg   No Date First Assessed or Time First Assessed found.   Side: Left  Orientation: lower  Location: Leg   Wound Image    Wound WDL ex   Dressing Appearance Open to air   Drainage Amount Scant   Drainage Characteristics/Odor Serosanguineous   Appearance Fibrin   Tissue loss description Full thickness   Periwound Area Excoriated   Wound Edges Defined   Care Cleansed with:;Moisturizing agent   Dressing Abd pad;Rolled gauze   Dressing Change Due 02/01/19       Wound lower Leg   No Date First Assessed or Time First Assessed found.   Side: Right  Orientation: lower  Location: Leg   Wound Image    Wound WDL ex   Dressing Appearance Open to air   Appearance Fibrin   Tissue loss description Full thickness   Periwound Area Excoriated   Wound Edges Defined   Care Cleansed with:;Moisturizing agent   Dressing Change Due 02/01/19       Wound posterior Elbow   No Date First Assessed or Time First Assessed found.   Side: Left  Orientation: posterior  Location: Elbow   Wound Image    Wound WDL ex   Drainage Amount Scant   Drainage Characteristics/Odor  Serosanguineous   Appearance Pink;Red   Wound Edges Undefined   Wound Length (cm) 4 cm   Wound Width (cm) 4 cm   Wound Depth (cm) 0.1 cm   Wound Volume (cm^3) 1.6 cm^3   Wound Surface Area (cm^2) 16 cm^2   Care Cleansed with:;Sterile normal saline   Dressing Removed;Applied;Changed;Foam   Dressing Change Due 02/01/19

## 2019-01-31 NOTE — SUBJECTIVE & OBJECTIVE
Interval History: See hospital course    Review of Systems  Objective:     Vital Signs (Most Recent):  Temp: 98.8 °F (37.1 °C) (01/31/19 1205)  Pulse: 93 (01/31/19 1205)  Resp: 18 (01/31/19 1205)  BP: (!) 140/88 (01/31/19 1205)  SpO2: 100 % (01/31/19 1205) Vital Signs (24h Range):  Temp:  [97.3 °F (36.3 °C)-98.8 °F (37.1 °C)] 98.8 °F (37.1 °C)  Pulse:  [80-96] 93  Resp:  [14-18] 18  SpO2:  [98 %-100 %] 100 %  BP: ()/(59-97) 140/88     Weight: 52.1 kg (114 lb 13.8 oz)  Body mass index is 16.96 kg/m².    Intake/Output Summary (Last 24 hours) at 1/31/2019 1317  Last data filed at 1/30/2019 1957  Gross per 24 hour   Intake 1000 ml   Output --   Net 1000 ml      Physical Exam   Constitutional: He is oriented to person, place, and time. He appears well-developed.   Thin male   HENT:   Head: Normocephalic and atraumatic.   Eyes: Pupils are equal, round, and reactive to light.   Cardiovascular: Normal rate, regular rhythm and normal heart sounds.   Pulmonary/Chest: Effort normal and breath sounds normal.   Abdominal: Soft. There is no tenderness.   Musculoskeletal: He exhibits no edema or tenderness.   Neurological: He is alert and oriented to person, place, and time.   Skin:   Lower leg wounds, not fully healed with excoriations   Nursing note and vitals reviewed.      Significant Labs:   CBC:   Recent Labs   Lab 01/30/19  1215 01/31/19  0511   WBC 23.27* 15.99*   HGB 11.2* 10.1*   HCT 36.7* 33.5*   * 482*     CMP:   Recent Labs   Lab 01/30/19  1215 01/31/19  0511   * 139   K 3.5 3.4*   CL 93* 101   CO2 32* 28   GLU 62* 79   BUN 18 13   CREATININE 1.2 0.8   CALCIUM 10.0 9.4   PROT 8.5*  --    ALBUMIN 2.3*  --    BILITOT 0.1  --    ALKPHOS 135  --    AST 14  --    ALT 16  --    ANIONGAP 10 10   EGFRNONAA >60.0 >60.0       Significant Imaging: I have reviewed all pertinent imaging results/findings within the past 24 hours.

## 2019-01-31 NOTE — HOSPITAL COURSE
Patient admitted w/ hypoglycemia & found to have a leukocytosis which resolved w/ IVF. His insulin regimen was titrated and patient symptomatically improved. Additionally, his amaryl was discontinued. Due to concern that he may not be taking his insulin correctly home health orders were placed. He was found to have wounds on his extremities from picking his skin. Patient educated on avoiding picking his scabs. Patient is stable for discharge.

## 2019-01-31 NOTE — SUBJECTIVE & OBJECTIVE
Past Medical History:   Diagnosis Date    Anxiety     Depression     Diabetes mellitus     DKA (diabetic ketoacidoses)     Glaucoma     Hyperlipemia     Hypertension     Seizures        History reviewed. No pertinent surgical history.  Family History     None        Tobacco Use    Smoking status: Never Smoker    Smokeless tobacco: Never Used   Substance and Sexual Activity    Alcohol use: No    Drug use: No    Sexual activity: Not on file       Review of patient's allergies indicates:  No Known Allergies    Medications:  Continuous Infusions:  Scheduled Meds:   aspirin  81 mg Oral Daily    atorvastatin  80 mg Oral Daily    carBAMazepine  400 mg Oral BID    insulin aspart U-100  8 Units Subcutaneous TIDWM    insulin detemir U-100  30 Units Subcutaneous QHS    venlafaxine  75 mg Oral Daily     PRN Meds:dextrose 50%, dextrose 50%, glucagon (human recombinant), glucose, glucose, insulin aspart U-100, ondansetron, sodium chloride 0.9%    Review of Systems   Constitutional: Positive for fatigue and malaise.   Skin: Positive for itching and rash.     Objective:     Vital Signs (Most Recent):  Temp: 98.3 °F (36.8 °C) (01/31/19 0752)  Pulse: 94 (01/31/19 0752)  Resp: 18 (01/31/19 0752)  BP: 131/85 (01/31/19 0752)  SpO2: 99 % (01/31/19 0752) Vital Signs (24h Range):  Temp:  [97.3 °F (36.3 °C)-98.3 °F (36.8 °C)] 98.3 °F (36.8 °C)  Pulse:  [80-96] 94  Resp:  [14-18] 18  SpO2:  [98 %-100 %] 99 %  BP: ()/(56-97) 131/85     Weight: 52.1 kg (114 lb 13.8 oz)  Body mass index is 16.96 kg/m².    Physical Exam   Constitutional: He appears well-developed.   Neurological: He is alert.   Psychiatric: He has a normal mood and affect.   Skin:   Areas Examined (abnormalities noted in diagram):   Head / Face Inspection Performed  Chest / Axilla Inspection Performed  Abdomen Inspection Performed  Back Inspection Performed  RUE Inspected  LUE Inspection Performed  RLE Inspected  LLE Inspection Performed

## 2019-01-31 NOTE — ASSESSMENT & PLAN NOTE
Fatigue of 2 days duration, with vomitx3, lightheadedness, dizziness and 1 fall.   Pt takes 15U aspart TIDWM and 48U long acting insulin  Recent ED visit for similar presentation, Glu >500 at that time, given insulin and IVF and discharged  Given 2L IVF in ED with improvement of BP  BH4 normal

## 2019-01-31 NOTE — PLAN OF CARE
Problem: Adult Inpatient Plan of Care  Goal: Plan of Care Review  Pt free of falls and injury. Pt AAOx4. Denies pain during the shift.VS stable.Bed low, breaks locked, SR up x2, call light within reach, will continue to monitor.

## 2019-01-31 NOTE — CONSULTS
[facility]  Dermatology  Consult Note    Patient Name: Pavel Field  MRN: [id number]  Admission Date: 1/30/2019  Hospital Length of Stay: 0 days  Attending Physician: Humza Ortiz MD  Primary Care Provider: Ingrid Madrigal MD     Inpatient consult to Dermatology  Consult performed by: Karen Chester MD  Consult ordered by: Bandar Brennan MD        Subjective:     Principal Problem:Fatigue    HPI:     Mr Field is a 38 yo  male patient with PMHx of HTN, HLD, IDDM, seizure disorder, depression , and developmental delay that presents to the ED with three day history of fatigue, light-headedness upon standing. Patient noted to have bilateral lower extremity wounds and similar wounds on the left elbow. Dermatology consulted for evaluation.     Difficult to obtain accurate patient history. Per patient, lesions started as bug bites on his lower legs several months ago. Patient reports constantly scratching and picking at the lesions. He currently goes to wound care every other week as an outpatient, but states he has difficulty bandaging the wounds at home and they are often exposed. During interview, patient was scratching at lesions on left elbow.         Past Medical History:   Diagnosis Date    Anxiety     Depression     Diabetes mellitus     DKA (diabetic ketoacidoses)     Glaucoma     Hyperlipemia     Hypertension     Seizures        History reviewed. No pertinent surgical history.  Family History     None        Tobacco Use    Smoking status: Never Smoker    Smokeless tobacco: Never Used   Substance and Sexual Activity    Alcohol use: No    Drug use: No    Sexual activity: Not on file       Review of patient's allergies indicates:  No Known Allergies    Medications:  Continuous Infusions:  Scheduled Meds:   aspirin  81 mg Oral Daily    atorvastatin  80 mg Oral Daily    carBAMazepine  400 mg Oral BID    insulin aspart U-100  8 Units  Subcutaneous TIDWM    insulin detemir U-100  30 Units Subcutaneous QHS    venlafaxine  75 mg Oral Daily     PRN Meds:dextrose 50%, dextrose 50%, glucagon (human recombinant), glucose, glucose, insulin aspart U-100, ondansetron, sodium chloride 0.9%    Review of Systems   Constitutional: Positive for fatigue and malaise.   Skin: Positive for itching and rash.     Objective:     Vital Signs (Most Recent):  Temp: 98.3 °F (36.8 °C) (01/31/19 0752)  Pulse: 94 (01/31/19 0752)  Resp: 18 (01/31/19 0752)  BP: 131/85 (01/31/19 0752)  SpO2: 99 % (01/31/19 0752) Vital Signs (24h Range):  Temp:  [97.3 °F (36.3 °C)-98.3 °F (36.8 °C)] 98.3 °F (36.8 °C)  Pulse:  [80-96] 94  Resp:  [14-18] 18  SpO2:  [98 %-100 %] 99 %  BP: ()/(56-97) 131/85     Weight: 52.1 kg (114 lb 13.8 oz)  Body mass index is 16.96 kg/m².    Physical Exam   Constitutional: He appears well-developed.   Neurological: He is alert.   Psychiatric: He has a normal mood and affect.   Skin:   Areas Examined (abnormalities noted in diagram):   Head / Face Inspection Performed  Chest / Axilla Inspection Performed  Abdomen Inspection Performed  Back Inspection Performed  RUE Inspected  LUE Inspection Performed  RLE Inspected  LLE Inspection Performed                           Assessment/Plan:     Alteration in skin integrity    Patient with chronic, non-healing wounds of bilateral lower extremities and left extensor elbow. Per patient lesions started as pruritic bug bites. No primary lesions evident today, but current presentation indicative of factitial ulcers caused by frequent patient manipulation. During interview/exam, patient picked at lesions multiple times supporting diagnosis of factitial ulcerations.   - Lesions do not appear infected   - No primary lesions that could help identify initial cause of itch. DDx includes bites vs pruritus secondary to diabetic neuropathy  - Counseled patient to avoid picking/scratching, though this will likely be difficult  for patient given developmental delay   - Continue wound care as an outpatient. Keep lesions covered when possible to help discourage manipulation.            Thank you for your consult.    Cierra Chester MD  Dermatology  [facility]

## 2019-01-31 NOTE — PLAN OF CARE
Problem: Physical Therapy Goal  Goal: Physical Therapy Goal  Outcome: Outcome(s) achieved Date Met: 01/31/19  Patient at this time is at their functional baseline and does not require skilled acute PT services at this time. Please re consult PT if pt has change in functional status.   Godfrey Khanna PT, DPT  1/31/2019  Pager: [phone]

## 2019-02-01 VITALS
HEIGHT: 69 IN | TEMPERATURE: 98 F | DIASTOLIC BLOOD PRESSURE: 67 MMHG | BODY MASS INDEX: 17.02 KG/M2 | WEIGHT: 114.88 LBS | OXYGEN SATURATION: 98 % | RESPIRATION RATE: 18 BRPM | HEART RATE: 75 BPM | SYSTOLIC BLOOD PRESSURE: 147 MMHG

## 2019-02-01 LAB
ANION GAP SERPL CALC-SCNC: 7 MMOL/L
BASOPHILS # BLD AUTO: 0.02 K/UL
BASOPHILS NFR BLD: 0.2 %
BUN SERPL-MCNC: 7 MG/DL
CALCIUM SERPL-MCNC: 9.6 MG/DL
CHLORIDE SERPL-SCNC: 97 MMOL/L
CO2 SERPL-SCNC: 33 MMOL/L
CREAT SERPL-MCNC: 0.8 MG/DL
DIFFERENTIAL METHOD: ABNORMAL
EOSINOPHIL # BLD AUTO: 0.1 K/UL
EOSINOPHIL NFR BLD: 1.6 %
ERYTHROCYTE [DISTWIDTH] IN BLOOD BY AUTOMATED COUNT: 13.3 %
EST. GFR  (AFRICAN AMERICAN): >60 ML/MIN/1.73 M^2
EST. GFR  (NON AFRICAN AMERICAN): >60 ML/MIN/1.73 M^2
GLUCOSE SERPL-MCNC: 55 MG/DL
HCT VFR BLD AUTO: 34.4 %
HGB BLD-MCNC: 10.4 G/DL
IMM GRANULOCYTES # BLD AUTO: 0.04 K/UL
IMM GRANULOCYTES NFR BLD AUTO: 0.5 %
LYMPHOCYTES # BLD AUTO: 2.4 K/UL
LYMPHOCYTES NFR BLD: 27.7 %
MCH RBC QN AUTO: 24.8 PG
MCHC RBC AUTO-ENTMCNC: 30.2 G/DL
MCV RBC AUTO: 82 FL
MONOCYTES # BLD AUTO: 0.9 K/UL
MONOCYTES NFR BLD: 10.1 %
NEUTROPHILS # BLD AUTO: 5.3 K/UL
NEUTROPHILS NFR BLD: 59.9 %
NRBC BLD-RTO: 0 /100 WBC
PLATELET # BLD AUTO: 505 K/UL
PMV BLD AUTO: 11.1 FL
POCT GLUCOSE: 174 MG/DL (ref 70–110)
POCT GLUCOSE: 193 MG/DL (ref 70–110)
POCT GLUCOSE: 62 MG/DL (ref 70–110)
POTASSIUM SERPL-SCNC: 3.2 MMOL/L
RBC # BLD AUTO: 4.2 M/UL
SODIUM SERPL-SCNC: 137 MMOL/L
WBC # BLD AUTO: 8.8 K/UL

## 2019-02-01 PROCEDURE — 25000003 PHARM REV CODE 250: Performed by: STUDENT IN AN ORGANIZED HEALTH CARE EDUCATION/TRAINING PROGRAM

## 2019-02-01 PROCEDURE — 36415 COLL VENOUS BLD VENIPUNCTURE: CPT

## 2019-02-01 PROCEDURE — 99217 PR OBSERVATION CARE DISCHARGE: ICD-10-PCS | Mod: GC,,, | Performed by: STUDENT IN AN ORGANIZED HEALTH CARE EDUCATION/TRAINING PROGRAM

## 2019-02-01 PROCEDURE — 80048 BASIC METABOLIC PNL TOTAL CA: CPT

## 2019-02-01 PROCEDURE — 82962 GLUCOSE BLOOD TEST: CPT

## 2019-02-01 PROCEDURE — 85025 COMPLETE CBC W/AUTO DIFF WBC: CPT

## 2019-02-01 PROCEDURE — 99217 PR OBSERVATION CARE DISCHARGE: CPT | Mod: GC,,, | Performed by: STUDENT IN AN ORGANIZED HEALTH CARE EDUCATION/TRAINING PROGRAM

## 2019-02-01 PROCEDURE — G0378 HOSPITAL OBSERVATION PER HR: HCPCS

## 2019-02-01 RX ORDER — INSULIN ASPART 100 [IU]/ML
8 INJECTION, SOLUTION INTRAVENOUS; SUBCUTANEOUS
Qty: 1 BOX | Refills: 1 | Status: ON HOLD | OUTPATIENT
Start: 2019-02-01 | End: 2022-10-14

## 2019-02-01 RX ORDER — POTASSIUM CHLORIDE 20 MEQ/1
40 TABLET, EXTENDED RELEASE ORAL ONCE
Status: COMPLETED | OUTPATIENT
Start: 2019-02-01 | End: 2019-02-01

## 2019-02-01 RX ORDER — INSULIN GLARGINE 100 [IU]/ML
18 INJECTION, SOLUTION SUBCUTANEOUS NIGHTLY
Qty: 1 BOX | Refills: 1 | Status: ON HOLD | OUTPATIENT
Start: 2019-02-01 | End: 2022-10-14 | Stop reason: SDUPTHER

## 2019-02-01 RX ADMIN — POTASSIUM CHLORIDE 40 MEQ: 1500 TABLET, EXTENDED RELEASE ORAL at 10:02

## 2019-02-01 RX ADMIN — VENLAFAXINE HYDROCHLORIDE 75 MG: 75 CAPSULE, EXTENDED RELEASE ORAL at 10:02

## 2019-02-01 RX ADMIN — INSULIN ASPART 8 UNITS: 100 INJECTION, SOLUTION INTRAVENOUS; SUBCUTANEOUS at 01:02

## 2019-02-01 RX ADMIN — ATORVASTATIN CALCIUM 80 MG: 20 TABLET, FILM COATED ORAL at 10:02

## 2019-02-01 RX ADMIN — ASPIRIN 81 MG: 81 TABLET, COATED ORAL at 10:02

## 2019-02-01 RX ADMIN — CARBAMAZEPINE 400 MG: 200 TABLET ORAL at 10:02

## 2019-02-01 NOTE — PLAN OF CARE
[facility]    HOME HEALTH ORDERS  FACE TO FACE ENCOUNTER    Patient Name: Pavel Field  YOB: 1980    PCP: Ingrid Madrigal MD   [facility] [address] [facility] [id number]  PCP Phone Number: [phone]  PCP Fax: [phone]    Encounter Date: 02/01/2019    Admit to Home Health    Diagnoses:  Active Hospital Problems    Diagnosis  POA    *Fatigue [R53.83]  Yes    Alteration in skin integrity [R23.9]  Yes    Depression [F32.9]  Yes    Leukocytosis [D72.829]  Yes    Developmental disability [F89]  Yes    Orthostatic hypotension [I95.1]  Yes    Type 2 diabetes mellitus with hyperglycemia, with long-term current use of insulin [E11.65, Z79.4]  Not Applicable    Seizure disorder [G40.909]  Yes    Essential hypertension [I10]  Yes      Resolved Hospital Problems   No resolved problems to display.       No future appointments.        I have seen and examined this patient face to face today. My clinical findings that support the need for the home health skilled services and home bound status are the following:  Patient with medication mismanagement issues requiring home bound status as evidenced by  Poor understanding of medication regimen/dosage, Poor adherence to medication regimen/dosage and Uncontrolled hyperglycemic/hypoglycemic events.    Allergies:Review of patient's allergies indicates:  No Known Allergies    Diet: diabetic diet: 2000 calorie    Activities: activity as tolerated    Nursing:   SN to complete comprehensive assessment including routine vital signs. Instruct on disease process and s/s of complications to report to MD. Review/verify medication list sent home with the patient at time of discharge  and instruct patient/caregiver as needed. Frequency may be adjusted depending on start of care date.    Notify MD if SBP > 160 or < 90; DBP > 90 or < 50; HR > 120 or < 50; Temp > 101      CONSULTS:     to evaluate for  "community resources/long-range planning.  Aide to provide assistance with personal care, ADLs, and vital signs.    MISCELLANEOUS CARE:  Diabetic Care:   Report CBG < 60 or > 350 to physician.    Medications: Review discharge medications with patient and family and provide education.      Current Discharge Medication List      CONTINUE these medications which have CHANGED    Details   BASAGLNENA BAILONIKPEN U-100 INSULIN glargine 100 units/mL (3mL) SubQ pen Inject 18 Units into the skin every evening.  Qty: 1 Box, Refills: 1      insulin aspart U-100 (NOVOLOG) 100 unit/mL InPn pen Inject 8 Units into the skin 3 (three) times daily with meals.  Qty: 1 Box, Refills: 1         CONTINUE these medications which have NOT CHANGED    Details   aspirin (ECOTRIN) 81 MG EC tablet Take 1 tablet by mouth once daily.  Refills: 3      atorvastatin (LIPITOR) 80 MG tablet Take 80 mg by mouth once daily.      BD ULTRA-FINE BRUNILDA PEN NEEDLE 32 gauge x 5/32" Ndle Inject 1 Units into the skin 4 (four) times daily.  Refills: 6      carBAMazepine (TEGRETOL) 200 mg tablet Take 400 mg by mouth 2 (two) times daily.  Refills: 5      venlafaxine (EFFEXOR-XR) 75 MG 24 hr capsule Take 1 capsule by mouth once daily.  Refills: 5         STOP taking these medications       glimepiride (AMARYL) 4 MG tablet Comments:   Reason for Stopping:         citalopram (CELEXA) 40 MG tablet Comments:   Reason for Stopping:         lisinopril (PRINIVIL,ZESTRIL) 2.5 MG tablet Comments:   Reason for Stopping:                   "

## 2019-02-01 NOTE — PLAN OF CARE
SW faxed HH referral to [facility] via  for review. LAYLA will continue to follow.      02/01/19 6429   Post-Acute Status   Post-Acute Authorization Home Health/Hospice   Home Health/Hospice Status Referrals Sent     Marissa Martínez LMSW   - [facility]  Ext. [phone]

## 2019-02-01 NOTE — PLAN OF CARE
Dr Ortiz called stating the pt will need HHC for DC. JUDY requested he put in orders. JUDY then called Marissa RICH who will make HHC arrangements.

## 2019-02-01 NOTE — PLAN OF CARE
02/01/19 1435   Final Note   Assessment Type Final Discharge Note   Anticipated Discharge Disposition Home   Hospital Follow Up  Appt(s) scheduled? No

## 2019-02-01 NOTE — PLAN OF CARE
Problem: Adult Inpatient Plan of Care  Goal: Plan of Care Review  Outcome: Ongoing (interventions implemented as appropriate)  Pt aox4, blood sugars monitored ac/hs, plan is for pt to discharge home today. Wound care completed to BLE, pt instructed to apply lotion to legs when he returns home and to not pick at scabs. Pt verbalized understanding. Safety precautions maintained

## 2019-02-01 NOTE — PROGRESS NOTES
This morning, blood sugar was 62 before breakfast. Pt aox4 and stated he is asymptomatic, pt given juice and ate 100% of breakfast. Blood sugar rechecked after breakfast and was 174. Will continue to monitor blood sugars AC/HS.

## 2019-02-01 NOTE — PLAN OF CARE
Problem: Adult Inpatient Plan of Care  Goal: Plan of Care Review  Outcome: Ongoing (interventions implemented as appropriate)  Stable overnight, slept well.  Safety maintained: no fall or injury occurred overnight. Blood sugar checked, insulin due given. Given snacks of sandwhich and apple juice. Dressing on both legs intact and dry. Promoted rest and sleep.

## 2019-02-02 NOTE — DISCHARGE SUMMARY
[facility] Hospital Medicine  Discharge Summary      Patient Name: Pavel Field  MRN: [id number]  Admission Date: 1/30/2019  Hospital Length of Stay: 0 days  Discharge Date and Time: 2/1/2019  2:11 PM  Attending Physician: No att. providers found   Discharging Provider: Urbano Phillips MD  Primary Care Provider: Ingrid Madrigal MD  Hospital Medicine Team: [facility] HOSP MED 5 Urbano Phillips MD    HPI:   40 yo M with background of HTn, HLD, IDDM (diagnosed at age 23 with A1C >20), depression, seizure, developmental delay presenting for 2 day history of fatigue and weakness. Patient has been feeling weak and dizzy for 2 days, experienced an unwitnessed fall without loss of consciousness and hit his head without LOC or apparent injury. Pt had 3 episodes of vomiting last night and was unable to keep any food down, but was able to tolerate breakfast this morning. Pt continued to feel weak and attempted to feel well as he went to work, but left to present to ED due to excessive weakness. Pt had a similar presentation to ED 4 days ago with nausea, vomiting, weakness and Glu >500, he was given insulin and IVF and discharged. Pt today had Glu of 60 and hypotensive into 80s systolic. Pt denies diarrhea, vomiting today, abdominal pain, fevers, CP or SOB. Pt sees wound care every other week for lower leg wounds that do not heal properly.     Furthermore, patient reports that he has had multiple falls over the last few months. Pt reports that he collapses, without tripping, due to his weakness. He denies losing consciousness during these falls. He reports that his last seizure was in 1996 and he is compliant with his medication. Pt reports taking 48U of detemir nightly, 15U lantus TIDWM and glimiperide.     Pt received 2L IVF in ED and some food, no longer reporting nausea or weakness.     * No surgery found *      Hospital Course:   Patient admitted w/ hypoglycemia & found to have a leukocytosis  which resolved w/ IVF. His insulin regimen was titrated and patient symptomatically improved. Additionally, his amaryl was discontinued. Due to concern that he may not be taking his insulin correctly home health orders were placed. He was found to have wounds on his extremities from picking his skin. Patient educated on avoiding picking his scabs. Patient is stable for discharge.     Consults:   Consults (From admission, onward)        Status Ordering Provider     Inpatient consult to Dermatology  Once     Provider:  (Not yet assigned)    MOY Ornelas          No new Assessment & Plan notes have been filed under this hospital service since the last note was generated.  Service: Hospital Medicine    Final Active Diagnoses:    Diagnosis Date Noted POA    PRINCIPAL PROBLEM:  Fatigue [R53.83] 01/30/2019 Yes    Alteration in skin integrity [R23.9] 01/31/2019 Yes    Depression [F32.9] 01/30/2019 Yes    Leukocytosis [D72.829] 01/30/2019 Yes    Developmental disability [F89] 10/29/2018 Yes    Orthostatic hypotension [I95.1] 10/02/2018 Yes    Type 2 diabetes mellitus with hyperglycemia, with long-term current use of insulin [E11.65, Z79.4] 10/02/2018 Not Applicable    Seizure disorder [G40.909] 12/16/2016 Yes    Essential hypertension [I10] 12/16/2016 Yes      Problems Resolved During this Admission:       Discharged Condition: stable    Disposition: Home or Self Care    Follow Up:  Follow-up Information     Schedule an appointment as soon as possible for a visit with Ingrid Madrigal MD.    Specialty:  Cardiology  Contact information:  [facility] [id number]  [phone]                 Patient Instructions:      Ambulatory consult to Diabetic Education   Referral Priority: Routine Referral Type: Consultation   Referral Reason: Specialty Services Required   Requested Specialty: Diabetes   Number of Visits Requested: 1 Expiration Date: 02/01/20     Ambulatory  "consult to Endocrinology   Referral Priority: Routine Referral Type: Consultation   Requested Specialty: Endocrinology   Number of Visits Requested: 1     Physical Exam      Significant Diagnostic Studies: Labs:   CMP   Recent Labs   Lab 01/31/19 0511 02/01/19 0559    137   K 3.4* 3.2*    97   CO2 28 33*   GLU 79 55*   BUN 13 7   CREATININE 0.8 0.8   CALCIUM 9.4 9.6   ANIONGAP 10 7*   ESTGFRAFRICA >60.0 >60.0   EGFRNONAA >60.0 >60.0    and CBC   Recent Labs   Lab 01/31/19 0511 02/01/19 0559   WBC 15.99* 8.80   HGB 10.1* 10.4*   HCT 33.5* 34.4*   * 505*       Pending Diagnostic Studies:     None         Medications:  Reconciled Home Medications:      Medication List      CHANGE how you take these medications    BASAGLAR KWIKPEN U-100 INSULIN glargine 100 units/mL (3mL) SubQ pen  Generic drug:  insulin  Inject 18 Units into the skin every evening.  What changed:  how much to take     insulin aspart U-100 100 unit/mL Inpn pen  Commonly known as:  NovoLOG  Inject 8 Units into the skin 3 (three) times daily with meals.  What changed:  how much to take        CONTINUE taking these medications    aspirin 81 MG EC tablet  Commonly known as:  ECOTRIN  Take 1 tablet by mouth once daily.     atorvastatin 80 MG tablet  Commonly known as:  LIPITOR  Take 80 mg by mouth once daily.     BD ULTRA-FINE BRUNILDA PEN NEEDLE 32 gauge x 5/32" Ndle  Generic drug:  pen needle, diabetic  Inject 1 Units into the skin 4 (four) times daily.     carBAMazepine 200 mg tablet  Commonly known as:  TEGRETOL  Take 400 mg by mouth 2 (two) times daily.     venlafaxine 75 MG 24 hr capsule  Commonly known as:  EFFEXOR-XR  Take 1 capsule by mouth once daily.        STOP taking these medications    citalopram 40 MG tablet  Commonly known as:  CELEXA     glimepiride 4 MG tablet  Commonly known as:  AMARYL     lisinopril 2.5 MG tablet  Commonly known as:  PRINIVIL,ZESTRIL            Indwelling Lines/Drains at time of discharge: "   Lines/Drains/Airways          None          Time spent on the discharge of patient: 31 minutes  Patient was seen and examined on the date of discharge and determined to be suitable for discharge.         Urbano Phillips MD  Department of Hospital Medicine  [facility]

## 2019-02-04 LAB
BACTERIA BLD CULT: NORMAL
BACTERIA BLD CULT: NORMAL

## 2019-03-30 PROCEDURE — 96360 HYDRATION IV INFUSION INIT: CPT

## 2019-03-30 PROCEDURE — 96361 HYDRATE IV INFUSION ADD-ON: CPT

## 2019-03-30 PROCEDURE — 99284 PR EMERGENCY DEPT VISIT,LEVEL IV: ICD-10-PCS | Mod: ,,, | Performed by: EMERGENCY MEDICINE

## 2019-03-30 PROCEDURE — 82962 GLUCOSE BLOOD TEST: CPT

## 2019-03-30 PROCEDURE — 99284 EMERGENCY DEPT VISIT MOD MDM: CPT | Mod: ,,, | Performed by: EMERGENCY MEDICINE

## 2019-03-30 PROCEDURE — 99284 EMERGENCY DEPT VISIT MOD MDM: CPT | Mod: 25

## 2019-03-31 ENCOUNTER — HOSPITAL ENCOUNTER (EMERGENCY)
Facility: HOSPITAL | Age: 39
Discharge: HOME | End: 2019-03-31
Attending: EMERGENCY MEDICINE
Payer: MEDICARE

## 2019-03-31 VITALS
BODY MASS INDEX: 16.88 KG/M2 | TEMPERATURE: 98 F | RESPIRATION RATE: 16 BRPM | HEART RATE: 84 BPM | HEIGHT: 69 IN | DIASTOLIC BLOOD PRESSURE: 70 MMHG | OXYGEN SATURATION: 100 % | WEIGHT: 114 LBS | SYSTOLIC BLOOD PRESSURE: 121 MMHG

## 2019-03-31 DIAGNOSIS — R42 DIZZINESS: ICD-10-CM

## 2019-03-31 LAB
ALBUMIN SERPL BCP-MCNC: 2.4 G/DL (ref 3.5–5.2)
ALP SERPL-CCNC: 162 U/L (ref 55–135)
ALT SERPL W/O P-5'-P-CCNC: 15 U/L (ref 10–44)
ANION GAP SERPL CALC-SCNC: 11 MMOL/L (ref 8–16)
AST SERPL-CCNC: 13 U/L (ref 10–40)
BASOPHILS # BLD AUTO: 0.05 K/UL (ref 0–0.2)
BASOPHILS NFR BLD: 0.5 % (ref 0–1.9)
BILIRUB SERPL-MCNC: 0.1 MG/DL (ref 0.1–1)
BUN SERPL-MCNC: 15 MG/DL (ref 6–20)
CALCIUM SERPL-MCNC: 9.8 MG/DL (ref 8.7–10.5)
CHLORIDE SERPL-SCNC: 97 MMOL/L (ref 95–110)
CO2 SERPL-SCNC: 31 MMOL/L (ref 23–29)
CREAT SERPL-MCNC: 1.1 MG/DL (ref 0.5–1.4)
DIFFERENTIAL METHOD: ABNORMAL
EOSINOPHIL # BLD AUTO: 0.2 K/UL (ref 0–0.5)
EOSINOPHIL NFR BLD: 1.9 % (ref 0–8)
ERYTHROCYTE [DISTWIDTH] IN BLOOD BY AUTOMATED COUNT: 13 % (ref 11.5–14.5)
EST. GFR  (AFRICAN AMERICAN): >60 ML/MIN/1.73 M^2
EST. GFR  (NON AFRICAN AMERICAN): >60 ML/MIN/1.73 M^2
GLUCOSE SERPL-MCNC: 57 MG/DL (ref 70–110)
HCT VFR BLD AUTO: 37.8 % (ref 40–54)
HGB BLD-MCNC: 11.6 G/DL (ref 14–18)
IMM GRANULOCYTES # BLD AUTO: 0.03 K/UL (ref 0–0.04)
IMM GRANULOCYTES NFR BLD AUTO: 0.3 % (ref 0–0.5)
LYMPHOCYTES # BLD AUTO: 1.8 K/UL (ref 1–4.8)
LYMPHOCYTES NFR BLD: 18.9 % (ref 18–48)
MCH RBC QN AUTO: 25 PG (ref 27–31)
MCHC RBC AUTO-ENTMCNC: 30.7 G/DL (ref 32–36)
MCV RBC AUTO: 82 FL (ref 82–98)
MONOCYTES # BLD AUTO: 1 K/UL (ref 0.3–1)
MONOCYTES NFR BLD: 10.4 % (ref 4–15)
NEUTROPHILS # BLD AUTO: 6.4 K/UL (ref 1.8–7.7)
NEUTROPHILS NFR BLD: 68 % (ref 38–73)
NRBC BLD-RTO: 0 /100 WBC
PLATELET # BLD AUTO: 382 K/UL (ref 150–350)
PMV BLD AUTO: 10.6 FL (ref 9.2–12.9)
POCT GLUCOSE: 251 MG/DL (ref 70–110)
POCT GLUCOSE: 96 MG/DL (ref 70–110)
POTASSIUM SERPL-SCNC: 3.9 MMOL/L (ref 3.5–5.1)
PROT SERPL-MCNC: 7.9 G/DL (ref 6–8.4)
RBC # BLD AUTO: 4.64 M/UL (ref 4.6–6.2)
SODIUM SERPL-SCNC: 139 MMOL/L (ref 136–145)
WBC # BLD AUTO: 9.46 K/UL (ref 3.9–12.7)

## 2019-03-31 PROCEDURE — 25000003 PHARM REV CODE 250: Performed by: EMERGENCY MEDICINE

## 2019-03-31 PROCEDURE — 82962 GLUCOSE BLOOD TEST: CPT | Mod: 91

## 2019-03-31 PROCEDURE — 93010 ELECTROCARDIOGRAM REPORT: CPT | Mod: ,,, | Performed by: INTERNAL MEDICINE

## 2019-03-31 PROCEDURE — 93005 ELECTROCARDIOGRAM TRACING: CPT

## 2019-03-31 PROCEDURE — 93010 EKG 12-LEAD: ICD-10-PCS | Mod: ,,, | Performed by: INTERNAL MEDICINE

## 2019-03-31 PROCEDURE — 80053 COMPREHEN METABOLIC PANEL: CPT

## 2019-03-31 PROCEDURE — 85025 COMPLETE CBC W/AUTO DIFF WBC: CPT

## 2019-03-31 RX ADMIN — SODIUM CHLORIDE 1000 ML: 0.9 INJECTION, SOLUTION INTRAVENOUS at 01:03

## 2019-03-31 NOTE — ED PROVIDER NOTES
Encounter Date: 3/30/2019    SCRIBE #1 NOTE: I, Fatemeh Vicente, am scribing for, and in the presence of,  Dr. Moses. I have scribed the entire note.       History     Chief Complaint   Patient presents with    Dizziness     Felt lightheaded. Went to grab something and fell. History of seizures and developmental delay     Time seen by provider: 12:19 AM    39 y.o. male with medical conditions including seizures, DM, HLD, and DKA, who presents with complaint of dizziness. Per caregiver, patient was complaining of dizziness and light-headedness around 10:00PM. Patient went to the refrigerator for a water bottle and fell forward when closing the door. No LOC or head trauma during episode per caregiver. Patient has no physical complaints at this time. Patient reports similar hx of sx which he states were related to dehydration, glucose level, and blood pressure. Patient endorses bilateral lower extremity skin sores which are at baseline. Patient also endorses fluctuation of blood sugar levels today, reporting blood sugar of 346 earlier this morning and 147 shortly PTA. Patient denies current dizziness, light-headedness, fever, chills, n/v/d, dysuria, bowel or urinary incontinence. Patient compliant with all at home medications.       The history is provided by the patient and a caregiver.     Review of patient's allergies indicates:  No Known Allergies  Past Medical History:   Diagnosis Date    Anxiety     Depression     Diabetes mellitus     DKA (diabetic ketoacidoses)     Glaucoma     Hyperlipemia     Hypertension     Seizures      No past surgical history on file.  No family history on file.  Social History     Tobacco Use    Smoking status: Never Smoker    Smokeless tobacco: Never Used   Substance Use Topics    Alcohol use: No    Drug use: No     Review of Systems   Constitutional: Negative for chills and fever.   HENT: Negative for rhinorrhea and sore throat.    Eyes: Negative for visual  disturbance.   Respiratory: Negative for cough and shortness of breath.    Cardiovascular: Negative for chest pain.   Gastrointestinal: Negative for nausea and vomiting.   Genitourinary: Negative for dysuria and flank pain.   Musculoskeletal: Negative for back pain and gait problem.   Skin: Negative for rash.   Neurological: Negative for dizziness, weakness, light-headedness and numbness.       Physical Exam     Initial Vitals [03/30/19 2356]   BP Pulse Resp Temp SpO2   123/73 101 17 97.7 °F (36.5 °C) 99 %      MAP       --         Physical Exam    Nursing note and vitals reviewed.    Appearance: No acute distress.  Skin: No rashes seen.  Good turgor.  No abrasions.  No ecchymoses.  Eyes: No conjunctival injection.  ENT: Oropharynx clear.    Chest: Clear to auscultation bilaterally.  Good air movement.  No wheezes.  No rhonchi.  Cardiovascular: Regular rate and rhythm.  No murmurs. No gallops. No rubs.  Abdomen: Soft.  Not distended.  Nontender.  No guarding.  No rebound. No Masses  Musculoskeletal: Good range of motion all joints.  No deformities.  Neck supple.  No meningismus.  Neurologic: Equal strength in upper and lower extremities bilaterally.  Normal sensation.  No facial droop.  Normal speech.    Mental Status:  Alert and oriented x 3.  Appropriate, conversant.    ED Course   Procedures  Labs Reviewed   CBC W/ AUTO DIFFERENTIAL   COMPREHENSIVE METABOLIC PANEL   POCT GLUCOSE          Imaging Results    None          Medical Decision Making:   History:   Old Medical Records: I decided to obtain old medical records.  Initial Assessment:   Patient here for dizziness and a fall from standing. No sign of trauma. Patient remained hemodynamically stable but with a low blood glucose. Recheck sugar is high but patient is asymptomatic. Patient states he feels better after fluid and food and is comfy for discharge. Advised pt to follow up with PCP or return if concerning symptoms arise. Pt understands and agrees with  plan. Will d/c home.      Clinical Tests:   Radiological Study: Ordered and Reviewed  Medical Tests: Ordered and Reviewed            Scribe Attestation:   Scribe #1: I performed the above scribed service and the documentation accurately describes the services I performed. I attest to the accuracy of the note.            ED Course as of Mar 31 0437   Sun Mar 31, 2019   0141 CBC auto differential(!) [DT]      ED Course User Index  [DT] Coy Moses MD     Clinical Impression:       ICD-10-CM ICD-9-CM   1. Dizziness R42 780.4         Disposition:   Disposition: Discharged  Condition: Stable                        Coy Moses MD  03/31/19 4015

## 2019-03-31 NOTE — ED NOTES
"Historian states, "earlier his sugar was high. He has 24 hour care. We gave him some insulin and then rechecked it and it was 175 mg/dL, that was at about 10:40 tonight. Then he was in the kitchen and fell and hit his head (approximately 10:55 pm tonight)." historian denies pt having LOC. No acute distress noted. Stable condition. GCS 15.   "

## 2019-05-17 ENCOUNTER — HOSPITAL ENCOUNTER (EMERGENCY)
Facility: HOSPITAL | Age: 39
Discharge: HOME | End: 2019-05-17
Attending: EMERGENCY MEDICINE
Payer: MEDICARE

## 2019-05-17 VITALS
WEIGHT: 135 LBS | HEIGHT: 70 IN | HEART RATE: 76 BPM | SYSTOLIC BLOOD PRESSURE: 161 MMHG | TEMPERATURE: 99 F | OXYGEN SATURATION: 100 % | RESPIRATION RATE: 18 BRPM | BODY MASS INDEX: 19.33 KG/M2 | DIASTOLIC BLOOD PRESSURE: 98 MMHG

## 2019-05-17 DIAGNOSIS — K52.9 GASTROENTERITIS: Primary | ICD-10-CM

## 2019-05-17 DIAGNOSIS — R73.9 HYPERGLYCEMIA: ICD-10-CM

## 2019-05-17 LAB
ALBUMIN SERPL BCP-MCNC: 2.3 G/DL (ref 3.5–5.2)
ALLENS TEST: ABNORMAL
ALP SERPL-CCNC: 133 U/L (ref 55–135)
ALT SERPL W/O P-5'-P-CCNC: 15 U/L (ref 10–44)
ANION GAP SERPL CALC-SCNC: 6 MMOL/L (ref 8–16)
AST SERPL-CCNC: 15 U/L (ref 10–40)
B-OH-BUTYR BLD STRIP-SCNC: 0.1 MMOL/L (ref 0–0.5)
BACTERIA #/AREA URNS AUTO: ABNORMAL /HPF
BASOPHILS # BLD AUTO: 0.05 K/UL (ref 0–0.2)
BASOPHILS NFR BLD: 0.5 % (ref 0–1.9)
BILIRUB SERPL-MCNC: 0.1 MG/DL (ref 0.1–1)
BILIRUB UR QL STRIP: NEGATIVE
BUN SERPL-MCNC: 20 MG/DL (ref 6–20)
CALCIUM SERPL-MCNC: 9.8 MG/DL (ref 8.7–10.5)
CHLORIDE SERPL-SCNC: 98 MMOL/L (ref 95–110)
CLARITY UR REFRACT.AUTO: ABNORMAL
CO2 SERPL-SCNC: 32 MMOL/L (ref 23–29)
COLOR UR AUTO: ABNORMAL
CREAT SERPL-MCNC: 1.1 MG/DL (ref 0.5–1.4)
DIFFERENTIAL METHOD: ABNORMAL
EOSINOPHIL # BLD AUTO: 0.2 K/UL (ref 0–0.5)
EOSINOPHIL NFR BLD: 1.5 % (ref 0–8)
ERYTHROCYTE [DISTWIDTH] IN BLOOD BY AUTOMATED COUNT: 13.2 % (ref 11.5–14.5)
EST. GFR  (AFRICAN AMERICAN): >60 ML/MIN/1.73 M^2
EST. GFR  (NON AFRICAN AMERICAN): >60 ML/MIN/1.73 M^2
GLUCOSE SERPL-MCNC: 47 MG/DL (ref 70–110)
GLUCOSE UR QL STRIP: ABNORMAL
HCO3 UR-SCNC: 33.2 MMOL/L (ref 24–28)
HCT VFR BLD AUTO: 35 % (ref 40–54)
HGB BLD-MCNC: 11.2 G/DL (ref 14–18)
HGB UR QL STRIP: NEGATIVE
HYALINE CASTS UR QL AUTO: 11 /LPF
IMM GRANULOCYTES # BLD AUTO: 0.05 K/UL (ref 0–0.04)
IMM GRANULOCYTES NFR BLD AUTO: 0.5 % (ref 0–0.5)
KETONES UR QL STRIP: ABNORMAL
LDH SERPL L TO P-CCNC: 1.86 MMOL/L (ref 0.5–2.2)
LEUKOCYTE ESTERASE UR QL STRIP: NEGATIVE
LIPASE SERPL-CCNC: 7 U/L (ref 4–60)
LYMPHOCYTES # BLD AUTO: 1.6 K/UL (ref 1–4.8)
LYMPHOCYTES NFR BLD: 14.9 % (ref 18–48)
MCH RBC QN AUTO: 25.5 PG (ref 27–31)
MCHC RBC AUTO-ENTMCNC: 32 G/DL (ref 32–36)
MCV RBC AUTO: 80 FL (ref 82–98)
MICROSCOPIC COMMENT: ABNORMAL
MONOCYTES # BLD AUTO: 1.1 K/UL (ref 0.3–1)
MONOCYTES NFR BLD: 10.1 % (ref 4–15)
NEUTROPHILS # BLD AUTO: 7.9 K/UL (ref 1.8–7.7)
NEUTROPHILS NFR BLD: 72.5 % (ref 38–73)
NITRITE UR QL STRIP: NEGATIVE
NRBC BLD-RTO: 0 /100 WBC
PCO2 BLDA: 61.6 MMHG (ref 35–45)
PH SMN: 7.34 [PH] (ref 7.35–7.45)
PH UR STRIP: 5 [PH] (ref 5–8)
PLATELET # BLD AUTO: 376 K/UL (ref 150–350)
PMV BLD AUTO: 11.3 FL (ref 9.2–12.9)
PO2 BLDA: 19 MMHG (ref 40–60)
POC BE: 7 MMOL/L
POC SATURATED O2: 24 % (ref 95–100)
POC TCO2: 35 MMOL/L (ref 24–29)
POCT GLUCOSE: 133 MG/DL (ref 70–110)
POCT GLUCOSE: 239 MG/DL (ref 70–110)
POCT GLUCOSE: 53 MG/DL (ref 70–110)
POTASSIUM SERPL-SCNC: 4 MMOL/L (ref 3.5–5.1)
PROT SERPL-MCNC: 8.1 G/DL (ref 6–8.4)
PROT UR QL STRIP: ABNORMAL
RBC # BLD AUTO: 4.4 M/UL (ref 4.6–6.2)
RBC #/AREA URNS AUTO: 4 /HPF (ref 0–4)
SAMPLE: ABNORMAL
SITE: ABNORMAL
SODIUM SERPL-SCNC: 136 MMOL/L (ref 136–145)
SP GR UR STRIP: 1.02 (ref 1–1.03)
URN SPEC COLLECT METH UR: ABNORMAL
WBC # BLD AUTO: 10.78 K/UL (ref 3.9–12.7)
WBC #/AREA URNS AUTO: 4 /HPF (ref 0–5)
YEAST UR QL AUTO: ABNORMAL

## 2019-05-17 PROCEDURE — 99285 EMERGENCY DEPT VISIT HI MDM: CPT | Mod: ,,, | Performed by: EMERGENCY MEDICINE

## 2019-05-17 PROCEDURE — 96361 HYDRATE IV INFUSION ADD-ON: CPT

## 2019-05-17 PROCEDURE — 82010 KETONE BODYS QUAN: CPT

## 2019-05-17 PROCEDURE — 82962 GLUCOSE BLOOD TEST: CPT | Mod: 91

## 2019-05-17 PROCEDURE — 82803 BLOOD GASES ANY COMBINATION: CPT

## 2019-05-17 PROCEDURE — 25000003 PHARM REV CODE 250: Performed by: EMERGENCY MEDICINE

## 2019-05-17 PROCEDURE — 96374 THER/PROPH/DIAG INJ IV PUSH: CPT

## 2019-05-17 PROCEDURE — 81001 URINALYSIS AUTO W/SCOPE: CPT

## 2019-05-17 PROCEDURE — 87040 BLOOD CULTURE FOR BACTERIA: CPT

## 2019-05-17 PROCEDURE — 83690 ASSAY OF LIPASE: CPT

## 2019-05-17 PROCEDURE — 99285 PR EMERGENCY DEPT VISIT,LEVEL V: ICD-10-PCS | Mod: ,,, | Performed by: EMERGENCY MEDICINE

## 2019-05-17 PROCEDURE — 93005 ELECTROCARDIOGRAM TRACING: CPT

## 2019-05-17 PROCEDURE — 80053 COMPREHEN METABOLIC PANEL: CPT

## 2019-05-17 PROCEDURE — 93010 ELECTROCARDIOGRAM REPORT: CPT | Mod: ,,, | Performed by: INTERNAL MEDICINE

## 2019-05-17 PROCEDURE — 83605 ASSAY OF LACTIC ACID: CPT

## 2019-05-17 PROCEDURE — 99285 EMERGENCY DEPT VISIT HI MDM: CPT | Mod: 25

## 2019-05-17 PROCEDURE — 85025 COMPLETE CBC W/AUTO DIFF WBC: CPT

## 2019-05-17 PROCEDURE — 93010 EKG 12-LEAD: ICD-10-PCS | Mod: ,,, | Performed by: INTERNAL MEDICINE

## 2019-05-17 RX ORDER — ONDANSETRON 4 MG/1
4 TABLET, FILM COATED ORAL EVERY 6 HOURS
Qty: 12 TABLET | Refills: 0 | Status: SHIPPED | OUTPATIENT
Start: 2019-05-17 | End: 2022-11-22

## 2019-05-17 RX ORDER — DEXTROSE 50 % IN WATER (D50W) INTRAVENOUS SYRINGE
25
Status: COMPLETED | OUTPATIENT
Start: 2019-05-17 | End: 2019-05-17

## 2019-05-17 RX ADMIN — SODIUM CHLORIDE 1000 ML: 0.9 INJECTION, SOLUTION INTRAVENOUS at 03:05

## 2019-05-17 RX ADMIN — DEXTROSE MONOHYDRATE 25 G: 25 INJECTION, SOLUTION INTRAVENOUS at 02:05

## 2019-05-17 NOTE — ED NOTES
CBG=53.  Pt provided with 8 oz of orange juice.  Dr Nixon made aware; order for Dextrose received.

## 2019-05-17 NOTE — ED PROVIDER NOTES
"Encounter Date: 5/17/2019    SCRIBE #1 NOTE: I, Figueroa Lazar, am scribing for, and in the presence of,  Aramis Morse MD. I have scribed the following portions of the note - the EKG reading. Other sections scribed: HPI, ROS.       History     Chief Complaint   Patient presents with    Abdominal Pain     nvd     39 year old male with past medical history of anxiety, DKA, HTN, HLD, and diabetes mellitus presents to the ED with complaints of nausea, vomiting, and diarrhea. The patient was brought in by a man who works at [employer], whom states that the patient is "a client of [employer]." He states that his supervisor noticed that the patient felt bad and requested that he transport the patient to the ED. When they arrived, the patient vomited twice. The patient endorses having a sore throat before he began vomiting, feeling dizzy. The patient stated that his abdominal pain had resolved by the time he got to the ED.    The history is provided by the patient and a friend.     Review of patient's allergies indicates:  No Known Allergies  Past Medical History:   Diagnosis Date    Anxiety     Depression     Diabetes mellitus     DKA (diabetic ketoacidoses)     Glaucoma     Hyperlipemia     Hypertension     Seizures      History reviewed. No pertinent surgical history.  History reviewed. No pertinent family history.  Social History     Tobacco Use    Smoking status: Never Smoker    Smokeless tobacco: Never Used   Substance Use Topics    Alcohol use: No    Drug use: No     Review of Systems   Constitutional: Negative for chills and fever.   HENT: Positive for sore throat.    Respiratory: Negative for shortness of breath.    Cardiovascular: Negative for chest pain.   Gastrointestinal: Positive for diarrhea, nausea and vomiting. Negative for abdominal pain.   Genitourinary: Negative for dysuria.   Musculoskeletal: Negative for arthralgias and myalgias.   Skin: Negative for rash. "   Neurological: Negative for seizures and syncope.   Hematological: Does not bruise/bleed easily.       Physical Exam     Initial Vitals [05/17/19 1332]   BP Pulse Resp Temp SpO2   (!) 87/55 95 18 98.5 °F (36.9 °C) 95 %      MAP       --         Physical Exam    Constitutional: He is cooperative. He appears ill.   HENT:   Head: Normocephalic and atraumatic.   Mouth/Throat: Mucous membranes are dry.   Eyes: Conjunctivae are normal. No scleral icterus.   Neck: Normal range of motion. Neck supple. No JVD present.   Cardiovascular: Normal rate, regular rhythm and normal heart sounds.   Pulmonary/Chest: No accessory muscle usage. No tachypnea. No respiratory distress.   Abdominal: He exhibits no distension. There is no tenderness.   Musculoskeletal: Normal range of motion.   Neurological: He is alert.   Skin: Skin is warm and dry.         ED Course   Procedures  Labs Reviewed   CBC W/ AUTO DIFFERENTIAL - Abnormal; Notable for the following components:       Result Value    RBC 4.40 (*)     Hemoglobin 11.2 (*)     Hematocrit 35.0 (*)     Mean Corpuscular Volume 80 (*)     Mean Corpuscular Hemoglobin 25.5 (*)     Platelets 376 (*)     Gran # (ANC) 7.9 (*)     Immature Grans (Abs) 0.05 (*)     Mono # 1.1 (*)     Lymph% 14.9 (*)     All other components within normal limits   COMPREHENSIVE METABOLIC PANEL - Abnormal; Notable for the following components:    CO2 32 (*)     Glucose 47 (*)     Albumin 2.3 (*)     Anion Gap 6 (*)     All other components within normal limits   URINALYSIS, REFLEX TO URINE CULTURE - Abnormal; Notable for the following components:    Appearance, UA Hazy (*)     Protein, UA 2+ (*)     Glucose, UA 3+ (*)     Ketones, UA Trace (*)     All other components within normal limits    Narrative:     Preferred Collection Type->Urine, Clean Catch  yellow and grey   URINALYSIS MICROSCOPIC - Abnormal; Notable for the following components:    Hyaline Casts, UA 11 (*)     All other components within normal limits     Narrative:     Preferred Collection Type->Urine, Clean Catch  yellow and grey   ISTAT PROCEDURE - Abnormal; Notable for the following components:    POC PH 7.339 (*)     POC PCO2 61.6 (*)     POC PO2 19 (*)     POC HCO3 33.2 (*)     POC SATURATED O2 24 (*)     POC TCO2 35 (*)     All other components within normal limits   POCT GLUCOSE - Abnormal; Notable for the following components:    POCT Glucose 53 (*)     All other components within normal limits   POCT GLUCOSE - Abnormal; Notable for the following components:    POCT Glucose 239 (*)     All other components within normal limits   POCT GLUCOSE - Abnormal; Notable for the following components:    POCT Glucose 133 (*)     All other components within normal limits   CULTURE, BLOOD   CULTURE, BLOOD   BETA - HYDROXYBUTYRATE, SERUM   LIPASE     EKG Readings: (Independently Interpreted)   (EKG independently interpreted) Normal sinus rhythm at 83 bpm. No STEMI.        Imaging Results          X-Ray Chest AP Portable (Final result)  Result time 05/17/19 14:57:00    Final result by Arlene Ellington MD (05/17/19 14:57:00)                 Impression:      No intrathoracic disease identified.      Electronically signed by: Arlene Ellington MD  Date:    05/17/2019  Time:    14:57             Narrative:    EXAMINATION:  XR CHEST AP PORTABLE    CLINICAL HISTORY:  hyperglycemia;    TECHNIQUE:  Single frontal view of the chest was performed.    COMPARISON:  01/30/2019.    FINDINGS:  Mediastinal structures are midline. Cardiac silhouette and pulmonary vascular distribution are normal.    Lung volumes are normal and symmetric. I detect no pulmonary disease, pleural fluid, lymph node enlargement, cardiac decompensation, pneumothorax, pneumomediastinum, pneumoperitoneum or significant osseous abnormality.                              X-Rays:   Independently Interpreted Readings:   Chest X-Ray: Normal heart size.  No infiltrates.  No acute abnormalities.     Medical Decision  Making:   History:   Old Medical Records: I decided to obtain old medical records.  Initial Assessment:   Patient with multiple medical problems the most important at this time is a history of ketoacidosis with the patient having nausea vomiting this has to be considered  Clinical Tests:   Lab Tests: Ordered and Reviewed  Medical Tests: Ordered and Reviewed  ED Management:  Because of concern for ketoacidosis we will do labs including a beta hydroxy a venous gas a lipase a CMP and a CBC and will hydrate the patient aggressively  At this point we do not know if he is going to require a an insulin drip            Scribe Attestation:   Scribe #1: I performed the above scribed service and the documentation accurately describes the services I performed. I attest to the accuracy of the note.    Attending Attestation:             Attending ED Notes:   2:35 p.m.  Accu-Chek at this time was noted to be 55 and the patient will be given D50W IV.    39-year-old male presenting to the ED with a chief complaint of abdominal pain he was brought in by paramedics and at the scene he was found to be hypoglycemic patient was evaluated in the ED was given IV fluids labs were drawn several times during the treatment the patient's her chest and had to be given D50.  Patient was given medication for nausea.  Lab labs were reviewed and there were no major abnormalities patient improved significantly with the IV fluids and patient was subsequently discharged and prescriptions were given for the nausea it was felt that the patient had a gastroenteritis and the patient was subsequently discharged             Clinical Impression:       ICD-10-CM ICD-9-CM   1. Gastroenteritis K52.9 558.9   2. Hyperglycemia R73.9 790.29         Disposition:   Disposition: Discharged  Condition: Stable                        Aramis Morse MD  05/21/19 8885

## 2019-05-17 NOTE — ED NOTES
Pt awake and alert; resting quietly on stretcher.  Pt remains on continuous cardiac and pulse ox monitoring with non-invasive blood pressure to cycle every 30 minutes.  VS stable; NSR noted. Pt denies pain at this time; no acute distress or discomfort reported or observed.  Pt denies restroom needs at this time; urinal at bedside.  Pt is able to reposition self on stretcher. Bed locked in lowest position; side rails up and locked x 2; call light, bedside table, and personal belongings within reach. Room assessed for safety measures and cleanliness; no action needed at this time. Plan of care discussed.  Pt instructed to alert nurse for assistance and before attempting to get out of bed; verbalizes understanding. Pt denies needs or complaints at this time.  Caregiver remains at bedside; will continue to monitor.

## 2019-05-17 NOTE — ED NOTES
LOC: The patient is awake, alert, and oriented to place, time, situation. Affect is appropriate.  Speech is appropriate and clear.     APPEARANCE: Patient resting comfortably in no acute distress.  Patient appears poorly groomed; clothing soiled.    SKIN: The skin is warm and dry; color consistent with ethnicity.  Patient has poor skin turgor and dry mucus membranes. Skin abrasions noted to the forehead and left elbow due to recent fall; no bruising noted.     MUSCULOSKELETAL: Patient moving upper and lower extremities without difficulty.  Denies weakness.     RESPIRATORY: Airway is open and patent. Respirations spontaneous, even, easy, and non-labored.  Patient has a normal effort and rate.  No accessory muscle use noted. Denies cough.     CARDIAC:  Normal rhythm and rate noted; pt is normotensive.  No peripheral edema noted. No complaints of chest pain.      ABDOMEN: Soft and non tender to palpation.  No distention noted. Pt is very thin; appear cachectic.    NEUROLOGIC: Eyes open spontaneously.  Behavior appropriate to situation.  Follows commands; facial expression symmetrical.  Purposeful motor response noted; normal sensation in all extremities.

## 2019-05-17 NOTE — ED TRIAGE NOTES
Pt to the ER with complaints of dizziness that started this afternoon.  Pt also reporting onset of sore throat followed by two episodes of vomiting.  Pt denies dizziness currently; denies nausea or pain.

## 2019-05-17 NOTE — ED NOTES
Pt awake and alert; resting quietly on stretcher.  Pt remains on continuous cardiac and pulse ox monitoring with non-invasive blood pressure to cycle every 30 minutes.  VS stable; NSR noted. Pt denies pain at this time; no acute distress or discomfort reported or observed.  Pt denies restroom needs at this time; is able to reposition self on stretcher. Bed locked in lowest position; side rails up and locked x 2; call light, bedside table, and personal belongings within reach. Room assessed for safety measures and cleanliness; no action needed at this time. Pt awaiting reassessment per MD.  Pt instructed to alert nurse for assistance and before attempting to get out of bed; verbalizes understanding. Pt denies needs or complaints at this time. Volunteer remains at bedside; will continue to monitor.

## 2019-05-22 LAB
BACTERIA BLD CULT: NORMAL
BACTERIA BLD CULT: NORMAL

## 2019-07-01 NOTE — PROGRESS NOTES
[facility]:   INITIAL VISIT    Name: Pavel Field  MRN:[id number]   CSN: [id number]  Date of service: 7/1/2019    Age:39 y.o.   Gender:male     Referring Physician/Service: Ingrid Madrigal MD  [address]  [place] [address]   The patient is here today with his care-taker,  (patient lives in an apt with 24h care)   History obtained from patient - poor historian - limited info: no corraboration    CHIEF COMPLAINT:   - evaluation and management of seizures    PRESENT ILLNESS:    This is a 39 y.o. right handed male who presents for evaluation of seizures.    - no seizure x 5-6 years    Hx from patient:   - onset of seizures from 11/6/1996: nocturnal - unable to provide any details  - states that he was evaluated and started on AED: PHT -> PB -> CBZ  - no seizures reported since starting CBZ (unsure of duration) but no sz reported x at least 5-6 years (caretaker states that she has been with patient since that time and not seen any sz)    However, per caretaker, patient has been having hypoglecmia consistently (70s) x Jan 2019 with associated dizziness     Otherwise doing good    Any other relevant information:  - no    PREVIOUS EVALUATIONS:    Previous EEGs:   - in the past: normal     MRI Brain:  Results for orders placed or performed during the hospital encounter of 10/02/18   MRI Brain Without Contrast    Narrative    EXAMINATION:  MRI BRAIN WITHOUT CONTRAST    CLINICAL HISTORY:  ataxia;Ataxia, unspecified    TECHNIQUE:  Multiplanar multisequence MR imaging of the brain was performed without intravenous contrast.    COMPARISON:  Head CT 10/02/2018    FINDINGS:  Intracranial Compartment:    Ventricles are normal in size for age without evidence of hydrocephalus.    No abnormal restricted diffusion to suggest an acute infarction on today's examination.  However, there are multiple punctate foci of T2/FLAIR hyperintensity scattered throughout the supratentorial  white matter.  Lesions primarily involve the subcortical and deep white matter of the frontal lobes.  No parenchymal mass or hemorrhage.  Recent or remote major vascular distribution infarct.  In    No extra-axial blood or fluid collections.    Normal vascular flow voids are preserved.    Skull/Extracranial Contents (limited evaluation):    Bone marrow signal intensity is normal.  Mucous retention cysts in the right maxillary sinus.      Impression    No evidence of acute infarct or hemorrhage.    Nonspecific supratentorial leukoencephalopathy as above.  Favor chronic microvascular ischemic changes that are advanced for age, but clinical correlation is required.      Electronically signed by: José Luis Zhao MD  Date:    10/02/2018  Time:    15:18   CT Head Without Contrast    Narrative    EXAMINATION:  CT HEAD WITHOUT CONTRAST    CLINICAL HISTORY:  Dizziness;    TECHNIQUE:  Low dose axial CT images obtained throughout the head without intravenous contrast. Sagittal and coronal reconstructions were performed.    COMPARISON:  None.    FINDINGS:  Intracranial compartment:    Ventricles and sulci are normal in size for age without evidence of hydrocephalus.    No extra-axial blood or fluid collections.    The brain parenchyma appears within normal limits.  No parenchymal mass, hemorrhage, edema or major vascular distribution infarct.    Skull/extracranial contents (limited evaluation):    No fracture. Mastoid air cells and paranasal sinuses are essentially clear.      Impression    No evidence of acute intracranial pathology    Electronically signed by resident: Gautam Flores MD  Date:    10/02/2018  Time:    10:33    Electronically signed by: José Luis Zhao MD  Date:    10/02/2018  Time:    12:09      Additional tests:  1)CT Scan: no   2) EEG\Video Monitoring no   3) PET Scan: no  4) Neuropsychological evaluation: no  5) DEXA Scan: no  6) Others: no    RISK FACTORS FOR SEIZURES:    1. Head Trauma:  No    2. CNS  "Infections:  No  3. CNS Tumors: No     4. CNS Vascular Disease: No     5. Febrile Seizures: No    6. Developmental Delay: Yes - ?language difficulties - went to  Ed   7. Family History of Seizures: Yes  - maternal GM (no details)  8. Birth history: not sure    Pregnancy/Labor/Delivery: n/a    CURRENT MEDICATIONS:   Current Outpatient Medications   Medication Sig Dispense Refill    aspirin (ECOTRIN) 81 MG EC tablet Take 1 tablet by mouth once daily.  3    atorvastatin (LIPITOR) 80 MG tablet Take 80 mg by mouth once daily.      BASAGLAR KWIKPEN U-100 INSULIN glargine 100 units/mL (3mL) SubQ pen Inject 18 Units into the skin every evening. (Patient taking differently: Inject 25 Units into the skin every evening. ) 1 Box 1    BD ULTRA-FINE BRUNILDA PEN NEEDLE 32 gauge x 5/32" Ndle Inject 1 Units into the skin 4 (four) times daily.  6    carBAMazepine (TEGRETOL) 200 mg tablet Take 400 mg by mouth 2 (two) times daily.  5    insulin aspart U-100 (NOVOLOG) 100 unit/mL InPn pen Inject 8 Units into the skin 3 (three) times daily with meals. 1 Box 1    ondansetron (ZOFRAN) 4 MG tablet Take 1 tablet (4 mg total) by mouth every 6 (six) hours. 12 tablet 0    venlafaxine (EFFEXOR-XR) 75 MG 24 hr capsule Take 1 capsule by mouth once daily.  5     No current facility-administered medications for this visit.       CURRENT ANTI EPILEPTIC MEDICATIONS:    - CBZ 400mg bid    VAGAL NERVE STIMULATOR: n/a    PRIOR ANTICONVULSANT HISTORY: PHT, PB      PAST MEDICAL HISTORY:   Active Ambulatory Problems     Diagnosis Date Noted    Diabetic ketoacidosis without coma associated with type 1 diabetes mellitus 12/16/2016    Essential hypertension 12/16/2016    Seizure disorder 12/16/2016    Type 1 diabetes mellitus with hyperglycemia 12/17/2016    Orthostatic hypotension 10/02/2018    Type 2 diabetes mellitus with hyperglycemia, with long-term current use of insulin 10/02/2018    BRYSON (acute kidney injury) 10/02/2018    Acute metabolic " encephalopathy 10/02/2018    Severe malnutrition 10/03/2018    Developmental disability 10/29/2018    Fatigue 01/30/2019    Depression 01/30/2019    Leukocytosis 01/30/2019    Alteration in skin integrity 01/31/2019     Resolved Ambulatory Problems     Diagnosis Date Noted    Diabetic ketoacidosis associated with type 1 diabetes mellitus 12/16/2016    BRYSON (acute kidney injury) 12/16/2016    Hyperkalemia 12/16/2016    Non-intractable vomiting with nausea 12/16/2016    Volume depletion, gastrointestinal loss 12/16/2016    Dehydration 10/02/2018    Hypotension 10/29/2018    SIRS (systemic inflammatory response syndrome) 10/29/2018     Past Medical History:   Diagnosis Date    Anxiety     Depression     Diabetes mellitus     DKA (diabetic ketoacidoses)     Glaucoma     Hyperlipemia     Hypertension     Seizures       PAST PSYCHIATRIC HISTORY:  Depression - Effexor is helping    PAST SURGICAL HISTORY including Epilepsy surgery: No past surgical history on file.     FAMILY HISTORY: No family history on file.      SOCIAL HISTORY:   Social History     Socioeconomic History    Marital status: Single     Spouse name: Not on file    Number of children: Not on file    Years of education: Not on file    Highest education level: Not on file   Occupational History    Not on file   Social Needs    Financial resource strain: Not on file    Food insecurity:     Worry: Not on file     Inability: Not on file    Transportation needs:     Medical: Not on file     Non-medical: Not on file   Tobacco Use    Smoking status: Never Smoker    Smokeless tobacco: Never Used   Substance and Sexual Activity    Alcohol use: No    Drug use: No    Sexual activity: Not on file   Lifestyle    Physical activity:     Days per week: Not on file     Minutes per session: Not on file    Stress: Not on file   Relationships    Social connections:     Talks on phone: Not on file     Gets together: Not on file     Attends  Zoroastrian service: Not on file     Active member of club or organization: Not on file     Attends meetings of clubs or organizations: Not on file     Relationship status: Not on file   Other Topics Concern    Not on file   Social History Narrative    Not on file      a) Marital status: Single                                                    b) Living situation: patient lives alone  c) Employed/Unemployed/Other: did janitorial work until ~ few months ago - stopped working due to poor eyesight and sores in LE    DRIVING HISTORY:  Currently driving: No      LEVEL OF EDUCATION: 12th grade    SUBSTANCE USE: no hx of tobacco, etoh or other substance use    ALLERGIES: Patient has no known allergies.     REVIEW OF SYSTEMS:  Review of Systems   Constitutional: Negative for appetite change and fatigue.   HENT: Negative for dental problem and sore throat.    Eyes: Negative for photophobia and visual disturbance.   Respiratory: Negative for cough and shortness of breath.    Cardiovascular: Negative for chest pain and palpitations.   Gastrointestinal: Negative for nausea and vomiting.   Endocrine: Negative for polydipsia and polyuria.   Genitourinary: Negative for frequency and urgency.   Musculoskeletal: Negative for arthralgias and joint swelling.   Skin: Negative for color change and rash.   Allergic/Immunologic: Negative for immunocompromised state.   All other systems reviewed and are negative.  - except as per hpi    GENERAL EXAMINATION:  There were no vitals taken for this visit.     General Appearance:    This is an average built male who looks older than stated age  HEENT: There are no dysmorphic features   Skin: There are no obvious stigmata for neurocutaneous disorders.  Neck: not assessed  Cardiovascular: not assessed  Lungs: not assessed  Abdomen: deferred  Spine: not assessed  Extremities: not assessed    NEUROLOGICAL EXAMINATION:  Mental status: Alert and oriented x 4; pleasant and cooperative with  exam  Memory: not assessed in detail  Attention and concentration: intact  Speech: normal  Dysarthria: No   Eyes: PERRL; EOM intact; No nystagmus.The visual pursuits  were smooth with normal saccadic eye movements.   Fundoscopic Exam: not assessed  No facial asymmetry. Facial sensation bilaterally: not assessed  Hearing was intact bilaterally  Tongue and palate; SCM and trapezii bilaterally: not assessed     Motor examination:  Normal bulk and tone bilaterally. Power: no obvious deficits noted  Abnormal movements: none  Deep tendon reflexes: not assessed  Dysmetria: not assessed    Sensory examination:   - not assessed    Gait:  Normal gait and station    IMPRESSION:  The patient's history is consistent with:  Seizure disorder  40yo M with hx of seizures x 1996  - well-controlled on CBZ 400mg bid; last unprovoked sz was ~5-6 years ago  - recent episodes associated with hypoglycemia: AM blood sugars <70 consistently     Plan:  - no change in AED dose at this time  - chk level; labs  - ensure no hypoglycemia    Event/sz log  Seizure/fall precautions/restrictions    Plan of care was discussed in detail with patient and his care-giver.    Type 2 diabetes mellitus with hyperglycemia, with long-term current use of insulin  - per patient and care-giver persistent AM hypoglycemia since recent med change  - discussed importance of no hypoglycemia as it can provoke seizures  - advices to discuss with PCP/Endocrinologist      The patient was asked to call me/the clinic 1 week after the test(s) are done to obtain results.    More than 50% of the 60 minutes spent with the patient (as well as family/caregiver(s) was spent on face-to-face counseling about:    1. Diagnosis, plans, prognosis, medications and possible side-effects, risks and benefits of treatment, other alternatives to AEDs.  2. Risks related to continued seizures, status epilepticus, SUDEP, driving restrictions and seizure precautions ( no baths but showers are ok,  no swimming unsupervised, no use of heavy machinery, no use of sharp moving objects, avoid heights).   3. Issues related to pregnancy, OCP and breast feeding as it relates to epilepsy.  4. The potential of teratogenicity and suicidal risks of anti-epileptic medications.  5.Avoid any activity that compromise patient safety related to seizures.     Questions and concerns raised by the patient and family/care-giver(s) were addressed and they indicated understanding of everything discussed and agreed to plans as above.    Return in 1 year or earlier prn    Kitty Perez MD, RENU(HC), FACNS, FAJAMIE.  Neurology-Epilepsy.  [facility].

## 2019-07-02 ENCOUNTER — OFFICE VISIT (OUTPATIENT)
Dept: NEUROLOGY | Facility: CLINIC | Age: 39
End: 2019-07-02
Payer: MEDICARE

## 2019-07-02 DIAGNOSIS — G40.909 SEIZURE DISORDER: Primary | ICD-10-CM

## 2019-07-02 DIAGNOSIS — E11.65 TYPE 2 DIABETES MELLITUS WITH HYPERGLYCEMIA, WITH LONG-TERM CURRENT USE OF INSULIN: ICD-10-CM

## 2019-07-02 DIAGNOSIS — Z79.4 TYPE 2 DIABETES MELLITUS WITH HYPERGLYCEMIA, WITH LONG-TERM CURRENT USE OF INSULIN: ICD-10-CM

## 2019-07-02 PROCEDURE — 99205 PR OFFICE/OUTPT VISIT, NEW, LEVL V, 60-74 MIN: ICD-10-PCS | Mod: S$PBB,,, | Performed by: PSYCHIATRY & NEUROLOGY

## 2019-07-02 PROCEDURE — 99211 OFF/OP EST MAY X REQ PHY/QHP: CPT | Mod: PBBFAC | Performed by: PSYCHIATRY & NEUROLOGY

## 2019-07-02 PROCEDURE — 99999 PR PBB SHADOW E&M-EST. PATIENT-LVL I: CPT | Mod: PBBFAC,,, | Performed by: PSYCHIATRY & NEUROLOGY

## 2019-07-02 PROCEDURE — 99999 PR PBB SHADOW E&M-EST. PATIENT-LVL I: ICD-10-PCS | Mod: PBBFAC,,, | Performed by: PSYCHIATRY & NEUROLOGY

## 2019-07-02 PROCEDURE — 99205 OFFICE O/P NEW HI 60 MIN: CPT | Mod: S$PBB,,, | Performed by: PSYCHIATRY & NEUROLOGY

## 2019-07-02 NOTE — LETTER
July 7, 2019      Ingrid Madrigal MD  [address]  [facility] [address]           [facility] - Neurology Epilepsy  [address]  [facility] [address]  Phone: [phone]  Fax: [phone]          Patient: Pavel Field   MR Number: [id number]   YOB: 1980   Date of Visit: 7/2/2019       Dear Dr. Ingrid Madrigal:    Thank you for referring Pavel Field to me for evaluation. Attached you will find relevant portions of my assessment and plan of care.    If you have questions, please do not hesitate to call me. I look forward to following Pavel Field along with you.    Sincerely,    Kitty Perez MD    Enclosure  CC:  No Recipients    If you would like to receive this communication electronically, please contact [email or web address] or [phone] to request more information on [facility] access.    For providers and/or their staff who would like to refer a patient to [facility], please contact us through our one-stop-shop provider referral line, [facility], at [phone].    If you feel you have received this communication in error or would no longer like to receive these types of communications, please e-mail [email or web address]

## 2019-07-08 NOTE — ASSESSMENT & PLAN NOTE
38yo M with hx of seizures x 1996  - well-controlled on CBZ 400mg bid; last unprovoked sz was ~5-6 years ago  - recent episodes associated with hypoglycemia: AM blood sugars <70 consistently     Plan:  - no change in AED dose at this time  - chk level; labs  - ensure no hypoglycemia    Event/sz log  Seizure/fall precautions/restrictions    Plan of care was discussed in detail with patient and his care-giver.

## 2019-07-08 NOTE — ASSESSMENT & PLAN NOTE
- per patient and care-giver persistent AM hypoglycemia since recent med change  - discussed importance of no hypoglycemia as it can provoke seizures  - advices to discuss with PCP/Endocrinologist

## 2019-08-17 ENCOUNTER — HOSPITAL ENCOUNTER (EMERGENCY)
Facility: HOSPITAL | Age: 39
Discharge: HOME | End: 2019-08-17
Attending: EMERGENCY MEDICINE
Payer: MEDICARE

## 2019-08-17 VITALS
HEART RATE: 71 BPM | WEIGHT: 140 LBS | RESPIRATION RATE: 11 BRPM | BODY MASS INDEX: 20.73 KG/M2 | TEMPERATURE: 97 F | DIASTOLIC BLOOD PRESSURE: 71 MMHG | OXYGEN SATURATION: 100 % | HEIGHT: 69 IN | SYSTOLIC BLOOD PRESSURE: 116 MMHG

## 2019-08-17 DIAGNOSIS — I95.9 HYPOTENSION: ICD-10-CM

## 2019-08-17 DIAGNOSIS — E11.649 HYPOGLYCEMIA ASSOCIATED WITH DIABETES: Primary | ICD-10-CM

## 2019-08-17 DIAGNOSIS — N28.9 RENAL INSUFFICIENCY: ICD-10-CM

## 2019-08-17 LAB
ALBUMIN SERPL BCP-MCNC: 2.5 G/DL (ref 3.5–5.2)
ALP SERPL-CCNC: 165 U/L (ref 55–135)
ALT SERPL W/O P-5'-P-CCNC: 20 U/L (ref 10–44)
ANION GAP SERPL CALC-SCNC: 10 MMOL/L (ref 8–16)
AST SERPL-CCNC: 26 U/L (ref 10–40)
BACTERIA #/AREA URNS AUTO: ABNORMAL /HPF
BASOPHILS # BLD AUTO: 0.06 K/UL (ref 0–0.2)
BASOPHILS NFR BLD: 0.6 % (ref 0–1.9)
BILIRUB SERPL-MCNC: <0.1 MG/DL (ref 0.1–1)
BILIRUB UR QL STRIP: NEGATIVE
BUN SERPL-MCNC: 16 MG/DL (ref 6–20)
CALCIUM SERPL-MCNC: 9.9 MG/DL (ref 8.7–10.5)
CHLORIDE SERPL-SCNC: 97 MMOL/L (ref 95–110)
CLARITY UR REFRACT.AUTO: ABNORMAL
CO2 SERPL-SCNC: 27 MMOL/L (ref 23–29)
COLOR UR AUTO: YELLOW
CREAT SERPL-MCNC: 1.5 MG/DL (ref 0.5–1.4)
DIFFERENTIAL METHOD: ABNORMAL
EOSINOPHIL # BLD AUTO: 0.1 K/UL (ref 0–0.5)
EOSINOPHIL NFR BLD: 1.3 % (ref 0–8)
ERYTHROCYTE [DISTWIDTH] IN BLOOD BY AUTOMATED COUNT: 14.4 % (ref 11.5–14.5)
EST. GFR  (AFRICAN AMERICAN): >60 ML/MIN/1.73 M^2
EST. GFR  (NON AFRICAN AMERICAN): 58.2 ML/MIN/1.73 M^2
GLUCOSE SERPL-MCNC: 51 MG/DL (ref 70–110)
GLUCOSE SERPL-MCNC: 61 MG/DL (ref 70–110)
GLUCOSE UR QL STRIP: NEGATIVE
HCT VFR BLD AUTO: 38.9 % (ref 40–54)
HGB BLD-MCNC: 12.1 G/DL (ref 14–18)
HGB UR QL STRIP: NEGATIVE
HYALINE CASTS UR QL AUTO: 5 /LPF
IMM GRANULOCYTES # BLD AUTO: 0.05 K/UL (ref 0–0.04)
IMM GRANULOCYTES NFR BLD AUTO: 0.5 % (ref 0–0.5)
INR PPP: 1 (ref 0.8–1.2)
KETONES UR QL STRIP: NEGATIVE
LEUKOCYTE ESTERASE UR QL STRIP: NEGATIVE
LYMPHOCYTES # BLD AUTO: 1.8 K/UL (ref 1–4.8)
LYMPHOCYTES NFR BLD: 18.1 % (ref 18–48)
MAGNESIUM SERPL-MCNC: 2.2 MG/DL (ref 1.6–2.6)
MCH RBC QN AUTO: 25.5 PG (ref 27–31)
MCHC RBC AUTO-ENTMCNC: 31.1 G/DL (ref 32–36)
MCV RBC AUTO: 82 FL (ref 82–98)
MICROSCOPIC COMMENT: ABNORMAL
MONOCYTES # BLD AUTO: 1.3 K/UL (ref 0.3–1)
MONOCYTES NFR BLD: 13 % (ref 4–15)
NEUTROPHILS # BLD AUTO: 6.6 K/UL (ref 1.8–7.7)
NEUTROPHILS NFR BLD: 66.5 % (ref 38–73)
NITRITE UR QL STRIP: NEGATIVE
NRBC BLD-RTO: 0 /100 WBC
PH UR STRIP: 5 [PH] (ref 5–8)
PHOSPHATE SERPL-MCNC: 3.8 MG/DL (ref 2.7–4.5)
PLATELET # BLD AUTO: 367 K/UL (ref 150–350)
PMV BLD AUTO: 11 FL (ref 9.2–12.9)
POCT GLUCOSE: 138 MG/DL (ref 70–110)
POCT GLUCOSE: 61 MG/DL (ref 70–110)
POTASSIUM SERPL-SCNC: 4.9 MMOL/L (ref 3.5–5.1)
PROT SERPL-MCNC: 8.7 G/DL (ref 6–8.4)
PROT UR QL STRIP: ABNORMAL
PROTHROMBIN TIME: 10.1 SEC (ref 9–12.5)
RBC # BLD AUTO: 4.75 M/UL (ref 4.6–6.2)
RBC #/AREA URNS AUTO: 2 /HPF (ref 0–4)
SODIUM SERPL-SCNC: 134 MMOL/L (ref 136–145)
SP GR UR STRIP: 1.02 (ref 1–1.03)
SQUAMOUS #/AREA URNS AUTO: 0 /HPF
TROPONIN I SERPL DL<=0.01 NG/ML-MCNC: <0.006 NG/ML (ref 0–0.03)
TSH SERPL DL<=0.005 MIU/L-ACNC: 2.9 UIU/ML (ref 0.4–4)
URN SPEC COLLECT METH UR: ABNORMAL
WBC # BLD AUTO: 9.95 K/UL (ref 3.9–12.7)
WBC #/AREA URNS AUTO: 4 /HPF (ref 0–5)

## 2019-08-17 PROCEDURE — 99284 PR EMERGENCY DEPT VISIT,LEVEL IV: ICD-10-PCS | Mod: ,,, | Performed by: EMERGENCY MEDICINE

## 2019-08-17 PROCEDURE — 85610 PROTHROMBIN TIME: CPT

## 2019-08-17 PROCEDURE — 83735 ASSAY OF MAGNESIUM: CPT

## 2019-08-17 PROCEDURE — 82962 GLUCOSE BLOOD TEST: CPT

## 2019-08-17 PROCEDURE — 80053 COMPREHEN METABOLIC PANEL: CPT

## 2019-08-17 PROCEDURE — 85025 COMPLETE CBC W/AUTO DIFF WBC: CPT

## 2019-08-17 PROCEDURE — 84484 ASSAY OF TROPONIN QUANT: CPT

## 2019-08-17 PROCEDURE — 96365 THER/PROPH/DIAG IV INF INIT: CPT

## 2019-08-17 PROCEDURE — 25000003 PHARM REV CODE 250: Performed by: EMERGENCY MEDICINE

## 2019-08-17 PROCEDURE — 99285 EMERGENCY DEPT VISIT HI MDM: CPT | Mod: 25

## 2019-08-17 PROCEDURE — 63600175 PHARM REV CODE 636 W HCPCS: Performed by: EMERGENCY MEDICINE

## 2019-08-17 PROCEDURE — 84443 ASSAY THYROID STIM HORMONE: CPT

## 2019-08-17 PROCEDURE — 93010 EKG 12-LEAD: ICD-10-PCS | Mod: ,,, | Performed by: INTERNAL MEDICINE

## 2019-08-17 PROCEDURE — 93010 ELECTROCARDIOGRAM REPORT: CPT | Mod: ,,, | Performed by: INTERNAL MEDICINE

## 2019-08-17 PROCEDURE — 84100 ASSAY OF PHOSPHORUS: CPT

## 2019-08-17 PROCEDURE — 81001 URINALYSIS AUTO W/SCOPE: CPT

## 2019-08-17 PROCEDURE — 99284 EMERGENCY DEPT VISIT MOD MDM: CPT | Mod: ,,, | Performed by: EMERGENCY MEDICINE

## 2019-08-17 PROCEDURE — 93005 ELECTROCARDIOGRAM TRACING: CPT

## 2019-08-17 RX ORDER — DEXTROSE 50 % IN WATER (D50W) INTRAVENOUS SYRINGE
12.5
Status: DISCONTINUED | OUTPATIENT
Start: 2019-08-17 | End: 2019-08-17

## 2019-08-17 RX ADMIN — SODIUM CHLORIDE, SODIUM LACTATE, POTASSIUM CHLORIDE, AND CALCIUM CHLORIDE 1000 ML: .6; .31; .03; .02 INJECTION, SOLUTION INTRAVENOUS at 02:08

## 2019-08-17 RX ADMIN — DEXTROSE 125 ML: 10 SOLUTION INTRAVENOUS at 02:08

## 2019-08-17 NOTE — ED TRIAGE NOTES
C/o intermittent dizziness x2 days with standing. + nausea, denies vomiting. Sent from [facility] for hypotension. Denies any chest pain, SOB, fevers or chills, or urinary complaints.

## 2019-08-17 NOTE — ED PROVIDER NOTES
Encounter Date: 8/17/2019       History     Chief Complaint   Patient presents with    Multiple Complaints     dizzy for 2d, sent from [facility], nausea with any smell of food, diabetic, bp at clinic 76/58    Hypotension     39-year-old man with comorbidity of insulin-dependent diabetes with previous history of DKA as well as hypertension and seizure disorder presents from [facility] Clinic earlier today for evaluation of hypotension and hypoglycemia noted on vital signs in clinic.  At the time of my exam, the patient describes generalized weakness only and an aversion to the scent of food.  The accompanying caretaker reports recent aggressive outpatient insulin regimen due to his previously uncontrolled DM, and has since been diminished by his managing physician.  Head the time my exam, the patient is noted to be hypoglycemic to the tune of 61 mg/dL prompting infusion of 10% dextrose in the setting of an awake, oriented, and conversant mental status.        Review of patient's allergies indicates:  No Known Allergies  Past Medical History:   Diagnosis Date    Anxiety     Depression     Diabetes mellitus     DKA (diabetic ketoacidoses)     Glaucoma     Hyperlipemia     Hypertension     Seizures      Past Surgical History:   Procedure Laterality Date    EYE SURGERY      cataracts - left 2019     History reviewed. No pertinent family history.  Social History     Tobacco Use    Smoking status: Never Smoker    Smokeless tobacco: Never Used   Substance Use Topics    Alcohol use: No    Drug use: No     Review of Systems   Constitutional: Positive for appetite change. Negative for chills and fever.   HENT: Negative for facial swelling and trouble swallowing.    Respiratory: Negative for chest tightness and shortness of breath.    Cardiovascular: Negative for chest pain and palpitations.   Gastrointestinal: Positive for nausea. Negative for vomiting.   Genitourinary: Negative for flank pain  and hematuria.   Musculoskeletal: Negative for joint swelling and neck pain.   Skin: Positive for wound (Superficial abrasions to the left dorsal 5th finger as well as left elbow approximately 9-day-old). Negative for rash.   Neurological: Negative for seizures and headaches.   Psychiatric/Behavioral: Negative for agitation and hallucinations.       Physical Exam     Initial Vitals [08/17/19 1357]   BP Pulse Resp Temp SpO2   (!) 83/52 97 18 97.4 °F (36.3 °C) 100 %      MAP       --         Physical Exam    Vitals reviewed.  Constitutional:   39-year-old  man, thin, no acute distress noted   HENT:   Head: Normocephalic and atraumatic.   Several dental caries noted without evidence of intraoral swelling   Eyes: EOM are normal. Pupils are equal, round, and reactive to light.   Neck: No tracheal deviation present.   Cardiovascular: Normal rate, regular rhythm and normal heart sounds.   Pulmonary/Chest: Breath sounds normal. No stridor. No respiratory distress.   Abdominal: Soft. He exhibits no distension. There is no tenderness. There is no rebound.   Musculoskeletal: Normal range of motion. He exhibits no edema.   Neurological: He is alert and oriented to person, place, and time. GCS score is 15. GCS eye subscore is 4. GCS verbal subscore is 5. GCS motor subscore is 6.   Skin: Skin is warm and dry.   Psychiatric:   Evidence of Mild developmental delay noted, cooperative with exam         ED Course   Procedures  Labs Reviewed   CBC W/ AUTO DIFFERENTIAL - Abnormal; Notable for the following components:       Result Value    Hemoglobin 12.1 (*)     Hematocrit 38.9 (*)     Mean Corpuscular Hemoglobin 25.5 (*)     Mean Corpuscular Hemoglobin Conc 31.1 (*)     Platelets 367 (*)     Immature Grans (Abs) 0.05 (*)     Mono # 1.3 (*)     All other components within normal limits   COMPREHENSIVE METABOLIC PANEL - Abnormal; Notable for the following components:    Sodium 134 (*)     Glucose 51 (*)     Creatinine  1.5 (*)     Total Protein 8.7 (*)     Albumin 2.5 (*)     Total Bilirubin <0.1 (*)     Alkaline Phosphatase 165 (*)     eGFR if non  58.2 (*)     All other components within normal limits   URINALYSIS, REFLEX TO URINE CULTURE - Abnormal; Notable for the following components:    Appearance, UA Hazy (*)     Protein, UA 2+ (*)     All other components within normal limits    Narrative:     Preferred Collection Type->Urine, Clean Catch   URINALYSIS MICROSCOPIC - Abnormal; Notable for the following components:    Hyaline Casts, UA 5 (*)     All other components within normal limits    Narrative:     Preferred Collection Type->Urine, Clean Catch   POCT GLUCOSE MONITORING CONTINUOUS - Abnormal; Notable for the following components:    POC Glucose 61 (*)     All other components within normal limits   POCT GLUCOSE - Abnormal; Notable for the following components:    POCT Glucose 61 (*)     All other components within normal limits   POCT GLUCOSE - Abnormal; Notable for the following components:    POCT Glucose 138 (*)     All other components within normal limits   TSH   TROPONIN I   PROTIME-INR   MAGNESIUM   PHOSPHORUS     EKG Readings: (Independently Interpreted)   Initial Reading: No STEMI. Rhythm: Normal Sinus Rhythm. Heart Rate: 73. Ectopy: No Ectopy. ST Segments: Normal ST Segments. Axis: Normal.       Imaging Results          X-Ray Chest AP Portable (Final result)  Result time 08/17/19 15:07:42    Final result by Melissa Jaramillo MD (08/17/19 15:07:42)                 Impression:      No acute or active disease.      Electronically signed by: Melissa Jaramillo  Date:    08/17/2019  Time:    15:07             Narrative:    EXAMINATION:  XR CHEST AP PORTABLE    CLINICAL HISTORY:  Chest Pain;    TECHNIQUE:  Single frontal view of the chest was performed.    COMPARISON:  05/17/2019 chest    FINDINGS:  Single AP portable view at 14:44    Heart and hilar shadows and trachea appear normal.  No pneumothorax or  pleural effusion.  No edema or consolidation or nodule.  Visualized bowel gas pattern appears normal.  No rib fracture.  There are overlying leads and snaps.                                 Medical Decision Making:   History:   Old Medical Records: I decided to obtain old medical records.  Differential Diagnosis:   Hypoglycemia, a KI, occult sepsis, electrolyte derangement, inappropriate insulin dosing  Clinical Tests:   Lab Tests: Ordered and Reviewed  Radiological Study: Ordered and Reviewed  Medical Tests: Ordered and Reviewed              Attending Attestation:             Attending ED Notes:   Emergency department findings today reveal evidence of mild renal insufficiency similar in character to previous recent findings as well as minimal anemia as well as hypoglycemia.  Urinalysis does not reveal evidence of occult infectious process, EKG does not reveal evidence of acute injury pattern, and chest x-ray is unremarkable for acute injury. The patient did receive IV glucose as well as food stuffs with subsequent normalization of his serum glucose.  I believe that his hypoglycemia issues are related to insulin dosing higher than indicated at this time.  He will be discharged home in improved condition with instructions to continue his revised outpatient insulin schedule and follow-up with his managing physician next available and return to the ED as needed for urgent concerns.             Clinical Impression:       ICD-10-CM ICD-9-CM   1. Hypoglycemia associated with diabetes E11.649 250.80   2. Hypotension I95.9 458.9   3. Renal insufficiency N28.9 593.9         Disposition:   Disposition: Discharged  Condition: Stable                        Jhonatan Conde MD  08/17/19 0058

## 2019-08-17 NOTE — ED NOTES
Patient identifiers have been checked and are correct.  Patient assisted to ED stretcher and placed in a hospital gown.     LOC: The patient is awake, alert, and aware of environment. The patient is oriented x 3 and speaking appropriately.   APPEARANCE: No acute distress noted.   PSYCHOSOCIAL: Patient is calm and cooperative.   SKIN: The skin is warm, dry.   RESPIRATORY: Airway is open and patent. Bilateral chest rise and fall. Respirations are spontaneous, even and unlabored. Normal effort and rate noted. No accessory muscle use noted. Denies any SOB.   CARDIAC: Patient has a normal rate and rhythm. Denies any CP.   ABDOMEN: Soft and non tender to palpation. No distention noted.   URINARY: Patient reports routine urination without pain, frequency, or urgency. Voids independently. Reports urine appears anna/yellow in color.  NEUROLOGIC: Eyes open spontaneously, 3mm pupils round and reactive bilaterally. Speech clear. Tolerating saliva secretions well. Able to follow commands, demonstrating ability to actively and appropriately communicate within context of current conversation. Symmetrical facial muscles. Moving all extremities well. Movement is purposeful. No evidence of impaired sensation.   MUSCULOSKELETAL: No obvious deformities noted.     Side rails up x2. Call light within reach. Personal care taker at bedside.

## 2019-08-17 NOTE — ED NOTES
Urine collection delayed as he had just urinated prior tto arrival, Dr. Conde aware. Does not want patient cathed.

## 2019-11-26 ENCOUNTER — HOSPITAL ENCOUNTER (EMERGENCY)
Facility: HOSPITAL | Age: 39
Discharge: HOME | End: 2019-11-26
Attending: EMERGENCY MEDICINE
Payer: MEDICARE

## 2019-11-26 VITALS
RESPIRATION RATE: 15 BRPM | TEMPERATURE: 98 F | SYSTOLIC BLOOD PRESSURE: 142 MMHG | DIASTOLIC BLOOD PRESSURE: 93 MMHG | HEART RATE: 72 BPM | OXYGEN SATURATION: 100 % | HEIGHT: 69 IN | WEIGHT: 145 LBS | BODY MASS INDEX: 21.48 KG/M2

## 2019-11-26 DIAGNOSIS — I10 ESSENTIAL HYPERTENSION: Primary | ICD-10-CM

## 2019-11-26 DIAGNOSIS — I10 HTN (HYPERTENSION): ICD-10-CM

## 2019-11-26 DIAGNOSIS — H53.30 BINOCULAR VISUAL DISTURBANCE: ICD-10-CM

## 2019-11-26 LAB — POCT GLUCOSE: 201 MG/DL (ref 70–110)

## 2019-11-26 PROCEDURE — 93010 ELECTROCARDIOGRAM REPORT: CPT | Mod: ,,, | Performed by: INTERNAL MEDICINE

## 2019-11-26 PROCEDURE — 99284 PR EMERGENCY DEPT VISIT,LEVEL IV: ICD-10-PCS | Mod: ,,, | Performed by: EMERGENCY MEDICINE

## 2019-11-26 PROCEDURE — 99283 EMERGENCY DEPT VISIT LOW MDM: CPT | Mod: 25

## 2019-11-26 PROCEDURE — 82962 GLUCOSE BLOOD TEST: CPT

## 2019-11-26 PROCEDURE — 93010 EKG 12-LEAD: ICD-10-PCS | Mod: ,,, | Performed by: INTERNAL MEDICINE

## 2019-11-26 PROCEDURE — 99284 EMERGENCY DEPT VISIT MOD MDM: CPT | Mod: ,,, | Performed by: EMERGENCY MEDICINE

## 2019-11-26 PROCEDURE — 93005 ELECTROCARDIOGRAM TRACING: CPT

## 2019-11-27 NOTE — ED PROVIDER NOTES
"Encounter Date: 11/26/2019    SCRIBE #1 NOTE: I, Clemencia Jah, am scribing for, and in the presence of,  Dr. Tam. I have scribed the entire note.       History     Chief Complaint   Patient presents with    Hypertension     /100 at home, took PRN lisinopril right after. Pt reports foggy vision.      Time patient was seen by the provider: 9:36 PM      The patient is a 39 y.o. male who presents to the ED with a complaint of hypertension. Patient reports hypertension with blood pressure of 153/100 at home today, referred to ED by doctor. He also endorses b/l blurry vision, "for a while." He states in the morning his vision is worse, but in the afternoon it slightly improves. He reports he had eye surgery for cataracts in August 2019.    The history is provided by the patient and medical records.     Review of patient's allergies indicates:  No Known Allergies  Past Medical History:   Diagnosis Date    Anxiety     Depression     Diabetes mellitus     DKA (diabetic ketoacidoses)     Glaucoma     Hyperlipemia     Hypertension     Seizures      Past Surgical History:   Procedure Laterality Date    EYE SURGERY      cataracts - left 2019     No family history on file.  Social History     Tobacco Use    Smoking status: Never Smoker    Smokeless tobacco: Never Used   Substance Use Topics    Alcohol use: No    Drug use: No     Review of Systems  General: No fever.  No chills.  Eyes: Blurry vision.No visual changes.  Head: No headache.    Integument: No rashes or lesions.  Chest: No shortness of breath.  Cardiovascular: No chest pain.  Abdomen: No abdominal pain.  No nausea or vomiting.  Urinary: No abnormal urination.  Neurologic: No focal weakness.  No numbness.  Hematologic: No easy bruising.  Endocrine: No excessive thirst or urination.   Physical Exam     Initial Vitals [11/26/19 2100]   BP Pulse Resp Temp SpO2   (!) 142/93 72 15 98.1 °F (36.7 °C) 100 %      MAP       --         Physical " Exam    Nursing note and vitals reviewed.    Appearance: No acute distress.  Skin: No rashes seen.  Good turgor.  No abrasions.  No ecchymoses.  Eyes: No conjunctival injection.  ENT: Oropharynx clear.    Chest: Clear to auscultation bilaterally.  Good air movement.  No wheezes.  No rhonchi.  Cardiovascular: Regular rate and rhythm.  No murmurs. No gallops. No rubs.  Abdomen: Soft.  Not distended.  Nontender.  No guarding.  No rebound.  Musculoskeletal: Good range of motion all joints.  No deformities.  Neck supple.  No meningismus.  Neurologic: Motor intact.  Sensation intact.  Cerebellar intact.  Cranial nerves intact.  Mental Status:  Alert and oriented x 3.  Appropriate, conversant.     ED Course   Procedures  Labs Reviewed   POCT GLUCOSE - Abnormal; Notable for the following components:       Result Value    POCT Glucose 201 (*)     All other components within normal limits          Imaging Results    None          Medical Decision Making:   History:   Old Medical Records: I decided to obtain old medical records.  Clinical Tests:   Lab Tests: Ordered and Reviewed  Medical Tests: Ordered and Reviewed  ED Management:  Blurry vision for months. Cataract surgery in both eyes. Not very compliant with diabetes medications and diet. Today he is worried about blood pressure of 153/100 and caretaker states they were told to come into the ED for that pressure. Came in currently 140/90. No acute interventions required. Blurry vision is not acute -- off and on for months, related to his cataracts and non-adherence to diabetes diet/medications.  Not unilateral -- doubt TIA/CVA.  No floaters, pain or redness. Will check visual acuity and refer back to ophthalmology.             Scribe Attestation:   Scribe #1: I performed the above scribed service and the documentation accurately describes the services I performed. I attest to the accuracy of the note.                          Clinical Impression:       ICD-10-CM ICD-9-CM    1. Essential hypertension I10 401.9   2. HTN (hypertension) I10 401.9   3. Binocular visual disturbance H53.30 368.30         Disposition:   Disposition: Discharged  Condition: Stable                     Conor Tam MD  11/26/19 0935

## 2019-11-28 ENCOUNTER — HOSPITAL ENCOUNTER (EMERGENCY)
Facility: HOSPITAL | Age: 39
Discharge: HOME | End: 2019-11-28
Attending: EMERGENCY MEDICINE
Payer: MEDICARE

## 2019-11-28 VITALS
BODY MASS INDEX: 21.48 KG/M2 | DIASTOLIC BLOOD PRESSURE: 76 MMHG | HEIGHT: 69 IN | SYSTOLIC BLOOD PRESSURE: 139 MMHG | OXYGEN SATURATION: 100 % | HEART RATE: 72 BPM | WEIGHT: 145 LBS | TEMPERATURE: 98 F | RESPIRATION RATE: 16 BRPM

## 2019-11-28 DIAGNOSIS — R11.10 VOMITING AND DIARRHEA: Primary | ICD-10-CM

## 2019-11-28 DIAGNOSIS — R19.7 VOMITING AND DIARRHEA: Primary | ICD-10-CM

## 2019-11-28 LAB
ALBUMIN SERPL BCP-MCNC: 2.8 G/DL (ref 3.5–5.2)
ALP SERPL-CCNC: 186 U/L (ref 55–135)
ALT SERPL W/O P-5'-P-CCNC: 24 U/L (ref 10–44)
ANION GAP SERPL CALC-SCNC: 12 MMOL/L (ref 8–16)
AST SERPL-CCNC: 23 U/L (ref 10–40)
BACTERIA #/AREA URNS AUTO: ABNORMAL /HPF
BASOPHILS # BLD AUTO: 0.07 K/UL (ref 0–0.2)
BASOPHILS NFR BLD: 0.7 % (ref 0–1.9)
BILIRUB SERPL-MCNC: 0.2 MG/DL (ref 0.1–1)
BILIRUB UR QL STRIP: NEGATIVE
BUN SERPL-MCNC: 15 MG/DL (ref 6–20)
CALCIUM SERPL-MCNC: 9 MG/DL (ref 8.7–10.5)
CHLORIDE SERPL-SCNC: 103 MMOL/L (ref 95–110)
CLARITY UR REFRACT.AUTO: ABNORMAL
CO2 SERPL-SCNC: 25 MMOL/L (ref 23–29)
COLOR UR AUTO: YELLOW
CREAT SERPL-MCNC: 1.1 MG/DL (ref 0.5–1.4)
DIFFERENTIAL METHOD: ABNORMAL
EOSINOPHIL # BLD AUTO: 0.1 K/UL (ref 0–0.5)
EOSINOPHIL NFR BLD: 0.8 % (ref 0–8)
ERYTHROCYTE [DISTWIDTH] IN BLOOD BY AUTOMATED COUNT: 14.2 % (ref 11.5–14.5)
EST. GFR  (AFRICAN AMERICAN): >60 ML/MIN/1.73 M^2
EST. GFR  (NON AFRICAN AMERICAN): >60 ML/MIN/1.73 M^2
GLUCOSE SERPL-MCNC: 159 MG/DL (ref 70–110)
GLUCOSE UR QL STRIP: ABNORMAL
HCT VFR BLD AUTO: 42.4 % (ref 40–54)
HGB BLD-MCNC: 12.4 G/DL (ref 14–18)
HGB UR QL STRIP: ABNORMAL
HYALINE CASTS UR QL AUTO: 4 /LPF
IMM GRANULOCYTES # BLD AUTO: 0.02 K/UL (ref 0–0.04)
IMM GRANULOCYTES NFR BLD AUTO: 0.2 % (ref 0–0.5)
KETONES UR QL STRIP: ABNORMAL
LACTATE SERPL-SCNC: 1.3 MMOL/L (ref 0.5–2.2)
LEUKOCYTE ESTERASE UR QL STRIP: NEGATIVE
LYMPHOCYTES # BLD AUTO: 1.2 K/UL (ref 1–4.8)
LYMPHOCYTES NFR BLD: 12.7 % (ref 18–48)
MAGNESIUM SERPL-MCNC: 1.8 MG/DL (ref 1.6–2.6)
MCH RBC QN AUTO: 25.1 PG (ref 27–31)
MCHC RBC AUTO-ENTMCNC: 29.2 G/DL (ref 32–36)
MCV RBC AUTO: 86 FL (ref 82–98)
MICROSCOPIC COMMENT: ABNORMAL
MONOCYTES # BLD AUTO: 0.8 K/UL (ref 0.3–1)
MONOCYTES NFR BLD: 8 % (ref 4–15)
NEUTROPHILS # BLD AUTO: 7.5 K/UL (ref 1.8–7.7)
NEUTROPHILS NFR BLD: 77.6 % (ref 38–73)
NITRITE UR QL STRIP: NEGATIVE
NRBC BLD-RTO: 0 /100 WBC
PH UR STRIP: 5 [PH] (ref 5–8)
PHOSPHATE SERPL-MCNC: 2.8 MG/DL (ref 2.7–4.5)
PLATELET # BLD AUTO: 301 K/UL (ref 150–350)
PMV BLD AUTO: 11.2 FL (ref 9.2–12.9)
POTASSIUM SERPL-SCNC: 4.5 MMOL/L (ref 3.5–5.1)
PROT SERPL-MCNC: 7.6 G/DL (ref 6–8.4)
PROT UR QL STRIP: ABNORMAL
RBC # BLD AUTO: 4.95 M/UL (ref 4.6–6.2)
RBC #/AREA URNS AUTO: 3 /HPF (ref 0–4)
SODIUM SERPL-SCNC: 140 MMOL/L (ref 136–145)
SP GR UR STRIP: 1.02 (ref 1–1.03)
SQUAMOUS #/AREA URNS AUTO: 1 /HPF
URN SPEC COLLECT METH UR: ABNORMAL
WBC # BLD AUTO: 9.69 K/UL (ref 3.9–12.7)
WBC #/AREA URNS AUTO: 1 /HPF (ref 0–5)

## 2019-11-28 PROCEDURE — 96374 THER/PROPH/DIAG INJ IV PUSH: CPT

## 2019-11-28 PROCEDURE — 84100 ASSAY OF PHOSPHORUS: CPT

## 2019-11-28 PROCEDURE — 85025 COMPLETE CBC W/AUTO DIFF WBC: CPT

## 2019-11-28 PROCEDURE — 81001 URINALYSIS AUTO W/SCOPE: CPT

## 2019-11-28 PROCEDURE — 80053 COMPREHEN METABOLIC PANEL: CPT

## 2019-11-28 PROCEDURE — 83735 ASSAY OF MAGNESIUM: CPT

## 2019-11-28 PROCEDURE — 96375 TX/PRO/DX INJ NEW DRUG ADDON: CPT | Mod: 59

## 2019-11-28 PROCEDURE — 83605 ASSAY OF LACTIC ACID: CPT

## 2019-11-28 PROCEDURE — 99284 EMERGENCY DEPT VISIT MOD MDM: CPT | Mod: ,,, | Performed by: EMERGENCY MEDICINE

## 2019-11-28 PROCEDURE — 99284 EMERGENCY DEPT VISIT MOD MDM: CPT | Mod: 25

## 2019-11-28 PROCEDURE — 63600175 PHARM REV CODE 636 W HCPCS: Performed by: EMERGENCY MEDICINE

## 2019-11-28 PROCEDURE — 99284 PR EMERGENCY DEPT VISIT,LEVEL IV: ICD-10-PCS | Mod: ,,, | Performed by: EMERGENCY MEDICINE

## 2019-11-28 RX ORDER — KETOROLAC TROMETHAMINE 30 MG/ML
10 INJECTION, SOLUTION INTRAMUSCULAR; INTRAVENOUS
Status: COMPLETED | OUTPATIENT
Start: 2019-11-28 | End: 2019-11-28

## 2019-11-28 RX ORDER — ONDANSETRON 4 MG/1
4 TABLET, ORALLY DISINTEGRATING ORAL EVERY 6 HOURS PRN
Qty: 12 TABLET | Refills: 0 | OUTPATIENT
Start: 2019-11-28 | End: 2022-02-03

## 2019-11-28 RX ORDER — DIPHENHYDRAMINE HYDROCHLORIDE 50 MG/ML
25 INJECTION INTRAMUSCULAR; INTRAVENOUS
Status: COMPLETED | OUTPATIENT
Start: 2019-11-28 | End: 2019-11-28

## 2019-11-28 RX ORDER — PROCHLORPERAZINE EDISYLATE 5 MG/ML
10 INJECTION INTRAMUSCULAR; INTRAVENOUS
Status: COMPLETED | OUTPATIENT
Start: 2019-11-28 | End: 2019-11-28

## 2019-11-28 RX ADMIN — PROCHLORPERAZINE EDISYLATE 10 MG: 5 INJECTION INTRAMUSCULAR; INTRAVENOUS at 08:11

## 2019-11-28 RX ADMIN — DIPHENHYDRAMINE HYDROCHLORIDE 25 MG: 50 INJECTION INTRAMUSCULAR; INTRAVENOUS at 08:11

## 2019-11-28 RX ADMIN — SODIUM CHLORIDE 1000 ML: 0.9 INJECTION, SOLUTION INTRAVENOUS at 08:11

## 2019-11-28 RX ADMIN — KETOROLAC TROMETHAMINE 10 MG: 30 INJECTION, SOLUTION INTRAMUSCULAR; INTRAVENOUS at 08:11

## 2019-11-29 NOTE — ED PROVIDER NOTES
Encounter Date: 11/28/2019    SCRIBE #1 NOTE: I, Johanne Longo, am scribing for, and in the presence of,  Dr. Crowley. I have scribed the entire note.       History     Chief Complaint   Patient presents with    Dizziness     since this morning. +nausea. Hx diabetes     Patient is a 39 year old male with a PMHx of IDDM, DKA, HLD, HTN, anxiety, and seizures, who presents to the ED with a chief complaint of emesis since yesterday. Patient reports being able to keep some food down until today, when he vomited after eating turkey and dressing. He also endorses an episode of diarrhea earlier. He has a headache to the right side of his head above his eyebrow since yesterday. Right eye is watering and he reports mild photophobia. He states that his right, inner eyelid hurts when he opens his eye, but denies pain in his actual eye. Endorses dizziness. Denies elevated glucose recently - last at-home reading was 124. Denies fever, problems from urination, ear pain, or nose pain.    The history is provided by the patient, a friend and medical records.     Review of patient's allergies indicates:  No Known Allergies  Past Medical History:   Diagnosis Date    Anxiety     Depression     Diabetes mellitus     DKA (diabetic ketoacidoses)     Glaucoma     Hyperlipemia     Hypertension     Seizures      Past Surgical History:   Procedure Laterality Date    EYE SURGERY      cataracts - left 2019     No family history on file.  Social History     Tobacco Use    Smoking status: Never Smoker    Smokeless tobacco: Never Used   Substance Use Topics    Alcohol use: No    Drug use: No     Review of Systems   Constitutional: Negative for fever.   HENT: Negative for ear pain.    Eyes: Positive for photophobia, pain and discharge. Negative for redness and itching.   Respiratory: Negative for shortness of breath.    Cardiovascular: Negative for leg swelling.   Gastrointestinal: Positive for diarrhea, nausea and vomiting.  Negative for abdominal pain.   Genitourinary: Negative for difficulty urinating and dysuria.   Musculoskeletal: Negative for back pain.   Skin: Negative for pallor.   Neurological: Positive for dizziness and headaches.       Physical Exam     Initial Vitals [11/28/19 2001]   BP Pulse Resp Temp SpO2   139/76 60 15 98.3 °F (36.8 °C) 100 %      MAP       --         Physical Exam    Nursing note and vitals reviewed.  Constitutional: He appears ill. No distress.   Skinny   HENT:   Head: Normocephalic and atraumatic.   Mouth/Throat: Oropharynx is clear and moist.   Eyes: Conjunctivae are normal.   Neck: Normal range of motion.   Cardiovascular: Normal rate, regular rhythm and normal heart sounds.   Pulmonary/Chest: Breath sounds normal. No respiratory distress.   Abdominal: Soft. He exhibits no distension. There is no tenderness.   Musculoskeletal: Normal range of motion.   Neurological: He is alert and oriented to person, place, and time.   Skin: Skin is warm and dry. Lesion noted.   Has bilateral chronic lower extremity wounds. Small, papules clustered around right temple, not vesicular or tender.          ED Course   Procedures  Labs Reviewed   CBC W/ AUTO DIFFERENTIAL - Abnormal; Notable for the following components:       Result Value    Hemoglobin 12.4 (*)     Mean Corpuscular Hemoglobin 25.1 (*)     Mean Corpuscular Hemoglobin Conc 29.2 (*)     Gran% 77.6 (*)     Lymph% 12.7 (*)     All other components within normal limits   COMPREHENSIVE METABOLIC PANEL - Abnormal; Notable for the following components:    Glucose 159 (*)     Albumin 2.8 (*)     Alkaline Phosphatase 186 (*)     All other components within normal limits   URINALYSIS, REFLEX TO URINE CULTURE - Abnormal; Notable for the following components:    Appearance, UA Hazy (*)     Protein, UA 3+ (*)     Glucose, UA 1+ (*)     Ketones, UA 1+ (*)     Occult Blood UA 1+ (*)     All other components within normal limits    Narrative:     Preferred Collection  Type->Urine, Clean Catch   URINALYSIS MICROSCOPIC - Abnormal; Notable for the following components:    Hyaline Casts, UA 4 (*)     All other components within normal limits    Narrative:     Preferred Collection Type->Urine, Clean Catch   MAGNESIUM   LACTIC ACID, PLASMA   PHOSPHORUS   POCT GLUCOSE MONITORING CONTINUOUS          Imaging Results    None          Medical Decision Making:   History:   Old Medical Records: I decided to obtain old medical records.  Clinical Tests:   Lab Tests: Ordered and Reviewed            Scribe Attestation:   Scribe #1: I performed the above scribed service and the documentation accurately describes the services I performed. I attest to the accuracy of the note.    Attending Attestation:             Attending ED Notes:   Evaluation of vomiting and diarrhea.  Patient is not significantly hyperglycemic.  I doubt DKA.  This is likely viral gastroenteritis.  Abdominal exam benign sore have low suspicion for an acute intra-abdominal process.  Headache likely due to vomiting and dehydration.  Does has continued on his face that this does not consistent with shingles.  Eye exam unremarkable. Lab work checked.  No significant abnormalities.  IV fluids and medication given.  After that his nausea vomiting resolved.  He is tolerating p.o. without difficulty.  His headache completely resolved.  No additional my pain. Will discharge with Zofran.  Return precautions advised discharged in good condition.                        Clinical Impression:       ICD-10-CM ICD-9-CM   1. Vomiting and diarrhea R11.10 787.03    R19.7 787.91         Disposition:   Disposition: Discharged  Condition: Stable                     Courtney Crowley MD  11/28/19 6623

## 2021-01-28 ENCOUNTER — HOSPITAL ENCOUNTER (EMERGENCY)
Facility: HOSPITAL | Age: 41
Discharge: HOME | End: 2021-01-29
Attending: EMERGENCY MEDICINE
Payer: MEDICARE

## 2021-01-28 VITALS
DIASTOLIC BLOOD PRESSURE: 94 MMHG | HEART RATE: 75 BPM | RESPIRATION RATE: 18 BRPM | WEIGHT: 145 LBS | SYSTOLIC BLOOD PRESSURE: 154 MMHG | TEMPERATURE: 98 F | OXYGEN SATURATION: 100 % | BODY MASS INDEX: 21.41 KG/M2

## 2021-01-28 DIAGNOSIS — E16.0 HYPOGLYCEMIA DUE TO INSULIN: Primary | ICD-10-CM

## 2021-01-28 DIAGNOSIS — T38.3X5A HYPOGLYCEMIA DUE TO INSULIN: Primary | ICD-10-CM

## 2021-01-28 LAB
POCT GLUCOSE: 154 MG/DL (ref 70–110)
POCT GLUCOSE: 92 MG/DL (ref 70–110)

## 2021-01-28 PROCEDURE — 99283 EMERGENCY DEPT VISIT LOW MDM: CPT | Mod: 25

## 2021-01-28 PROCEDURE — 99284 PR EMERGENCY DEPT VISIT,LEVEL IV: ICD-10-PCS | Mod: GC,,, | Performed by: EMERGENCY MEDICINE

## 2021-01-28 PROCEDURE — 99284 EMERGENCY DEPT VISIT MOD MDM: CPT | Mod: GC,,, | Performed by: EMERGENCY MEDICINE

## 2021-01-28 PROCEDURE — 82962 GLUCOSE BLOOD TEST: CPT

## 2021-01-29 LAB
ALBUMIN SERPL BCP-MCNC: 3.4 G/DL (ref 3.5–5.2)
ALP SERPL-CCNC: 138 U/L (ref 55–135)
ALT SERPL W/O P-5'-P-CCNC: 22 U/L (ref 10–44)
ANION GAP SERPL CALC-SCNC: 7 MMOL/L (ref 8–16)
AST SERPL-CCNC: 22 U/L (ref 10–40)
BASOPHILS # BLD AUTO: 0.05 K/UL (ref 0–0.2)
BASOPHILS NFR BLD: 0.5 % (ref 0–1.9)
BILIRUB SERPL-MCNC: 0.1 MG/DL (ref 0.1–1)
BUN SERPL-MCNC: 30 MG/DL (ref 6–20)
CALCIUM SERPL-MCNC: 8.5 MG/DL (ref 8.7–10.5)
CHLORIDE SERPL-SCNC: 101 MMOL/L (ref 95–110)
CO2 SERPL-SCNC: 28 MMOL/L (ref 23–29)
CREAT SERPL-MCNC: 1.3 MG/DL (ref 0.5–1.4)
DIFFERENTIAL METHOD: ABNORMAL
EOSINOPHIL # BLD AUTO: 0.1 K/UL (ref 0–0.5)
EOSINOPHIL NFR BLD: 1 % (ref 0–8)
ERYTHROCYTE [DISTWIDTH] IN BLOOD BY AUTOMATED COUNT: 13.2 % (ref 11.5–14.5)
EST. GFR  (AFRICAN AMERICAN): >60 ML/MIN/1.73 M^2
EST. GFR  (NON AFRICAN AMERICAN): >60 ML/MIN/1.73 M^2
GLUCOSE SERPL-MCNC: 232 MG/DL (ref 70–110)
GLUCOSE SERPL-MCNC: 335 MG/DL (ref 70–110)
HCT VFR BLD AUTO: 43.2 % (ref 40–54)
HGB BLD-MCNC: 12.8 G/DL (ref 14–18)
IMM GRANULOCYTES # BLD AUTO: 0.03 K/UL (ref 0–0.04)
IMM GRANULOCYTES NFR BLD AUTO: 0.3 % (ref 0–0.5)
LYMPHOCYTES # BLD AUTO: 1.2 K/UL (ref 1–4.8)
LYMPHOCYTES NFR BLD: 12.6 % (ref 18–48)
MCH RBC QN AUTO: 26.2 PG (ref 27–31)
MCHC RBC AUTO-ENTMCNC: 29.6 G/DL (ref 32–36)
MCV RBC AUTO: 89 FL (ref 82–98)
MONOCYTES # BLD AUTO: 0.7 K/UL (ref 0.3–1)
MONOCYTES NFR BLD: 7.1 % (ref 4–15)
NEUTROPHILS # BLD AUTO: 7.7 K/UL (ref 1.8–7.7)
NEUTROPHILS NFR BLD: 78.5 % (ref 38–73)
NRBC BLD-RTO: 0 /100 WBC
PLATELET # BLD AUTO: 281 K/UL (ref 150–350)
PMV BLD AUTO: 11.1 FL (ref 9.2–12.9)
POCT GLUCOSE: 335 MG/DL (ref 70–110)
POTASSIUM SERPL-SCNC: 4.5 MMOL/L (ref 3.5–5.1)
PROT SERPL-MCNC: 7.8 G/DL (ref 6–8.4)
RBC # BLD AUTO: 4.88 M/UL (ref 4.6–6.2)
SODIUM SERPL-SCNC: 136 MMOL/L (ref 136–145)
WBC # BLD AUTO: 9.77 K/UL (ref 3.9–12.7)

## 2021-01-29 PROCEDURE — 85025 COMPLETE CBC W/AUTO DIFF WBC: CPT

## 2021-01-29 PROCEDURE — 80053 COMPREHEN METABOLIC PANEL: CPT

## 2021-01-29 PROCEDURE — 82962 GLUCOSE BLOOD TEST: CPT

## 2021-04-26 ENCOUNTER — PATIENT MESSAGE (OUTPATIENT)
Dept: RESEARCH | Facility: HOSPITAL | Age: 41
End: 2021-04-26

## 2021-06-11 ENCOUNTER — HOSPITAL ENCOUNTER (EMERGENCY)
Facility: HOSPITAL | Age: 41
Discharge: HOME | End: 2021-06-12
Attending: EMERGENCY MEDICINE
Payer: MEDICARE

## 2021-06-11 DIAGNOSIS — R51.9 ACUTE INTRACTABLE HEADACHE, UNSPECIFIED HEADACHE TYPE: ICD-10-CM

## 2021-06-11 DIAGNOSIS — R11.2 NAUSEA VOMITING AND DIARRHEA: Primary | ICD-10-CM

## 2021-06-11 DIAGNOSIS — R19.7 NAUSEA VOMITING AND DIARRHEA: Primary | ICD-10-CM

## 2021-06-11 DIAGNOSIS — Z86.39 HISTORY OF DIABETES MELLITUS: ICD-10-CM

## 2021-06-11 LAB
ALBUMIN SERPL BCP-MCNC: 3.6 G/DL (ref 3.5–5.2)
ALP SERPL-CCNC: 116 U/L (ref 55–135)
ALT SERPL W/O P-5'-P-CCNC: 31 U/L (ref 10–44)
ANION GAP SERPL CALC-SCNC: 12 MMOL/L (ref 8–16)
AST SERPL-CCNC: 25 U/L (ref 10–40)
B-OH-BUTYR BLD STRIP-SCNC: 1 MMOL/L (ref 0–0.5)
BASOPHILS # BLD AUTO: 0.03 K/UL (ref 0–0.2)
BASOPHILS NFR BLD: 0.2 % (ref 0–1.9)
BILIRUB SERPL-MCNC: 0.3 MG/DL (ref 0.1–1)
BUN SERPL-MCNC: 25 MG/DL (ref 6–20)
CALCIUM SERPL-MCNC: 9 MG/DL (ref 8.7–10.5)
CHLORIDE SERPL-SCNC: 105 MMOL/L (ref 95–110)
CO2 SERPL-SCNC: 22 MMOL/L (ref 23–29)
CREAT SERPL-MCNC: 1.4 MG/DL (ref 0.5–1.4)
CTP QC/QA: YES
DIFFERENTIAL METHOD: ABNORMAL
EOSINOPHIL # BLD AUTO: 0 K/UL (ref 0–0.5)
EOSINOPHIL NFR BLD: 0.3 % (ref 0–8)
ERYTHROCYTE [DISTWIDTH] IN BLOOD BY AUTOMATED COUNT: 12.8 % (ref 11.5–14.5)
EST. GFR  (AFRICAN AMERICAN): >60 ML/MIN/1.73 M^2
EST. GFR  (NON AFRICAN AMERICAN): >60 ML/MIN/1.73 M^2
GLUCOSE SERPL-MCNC: 199 MG/DL (ref 70–110)
HCT VFR BLD AUTO: 42.1 % (ref 40–54)
HGB BLD-MCNC: 12.6 G/DL (ref 14–18)
IMM GRANULOCYTES # BLD AUTO: 0.07 K/UL (ref 0–0.04)
IMM GRANULOCYTES NFR BLD AUTO: 0.5 % (ref 0–0.5)
LIPASE SERPL-CCNC: 6 U/L (ref 4–60)
LYMPHOCYTES # BLD AUTO: 0.2 K/UL (ref 1–4.8)
LYMPHOCYTES NFR BLD: 1.9 % (ref 18–48)
MCH RBC QN AUTO: 26.4 PG (ref 27–31)
MCHC RBC AUTO-ENTMCNC: 29.9 G/DL (ref 32–36)
MCV RBC AUTO: 88 FL (ref 82–98)
MONOCYTES # BLD AUTO: 1.1 K/UL (ref 0.3–1)
MONOCYTES NFR BLD: 8.7 % (ref 4–15)
NEUTROPHILS # BLD AUTO: 11.3 K/UL (ref 1.8–7.7)
NEUTROPHILS NFR BLD: 88.4 % (ref 38–73)
NRBC BLD-RTO: 0 /100 WBC
PLATELET # BLD AUTO: 203 K/UL (ref 150–450)
PMV BLD AUTO: 12.3 FL (ref 9.2–12.9)
POTASSIUM SERPL-SCNC: 4.8 MMOL/L (ref 3.5–5.1)
PROT SERPL-MCNC: 7.8 G/DL (ref 6–8.4)
RBC # BLD AUTO: 4.77 M/UL (ref 4.6–6.2)
SARS-COV-2 RDRP RESP QL NAA+PROBE: NEGATIVE
SODIUM SERPL-SCNC: 139 MMOL/L (ref 136–145)
WBC # BLD AUTO: 12.83 K/UL (ref 3.9–12.7)

## 2021-06-11 PROCEDURE — 80047 BASIC METABLC PNL IONIZED CA: CPT

## 2021-06-11 PROCEDURE — 82010 KETONE BODYS QUAN: CPT | Performed by: PHYSICIAN ASSISTANT

## 2021-06-11 PROCEDURE — 25000003 PHARM REV CODE 250: Performed by: PHYSICIAN ASSISTANT

## 2021-06-11 PROCEDURE — 96375 TX/PRO/DX INJ NEW DRUG ADDON: CPT

## 2021-06-11 PROCEDURE — 80053 COMPREHEN METABOLIC PANEL: CPT | Performed by: PHYSICIAN ASSISTANT

## 2021-06-11 PROCEDURE — 99285 EMERGENCY DEPT VISIT HI MDM: CPT | Mod: 25

## 2021-06-11 PROCEDURE — 99284 EMERGENCY DEPT VISIT MOD MDM: CPT | Mod: CS,,, | Performed by: PHYSICIAN ASSISTANT

## 2021-06-11 PROCEDURE — 86703 HIV-1/HIV-2 1 RESULT ANTBDY: CPT | Performed by: EMERGENCY MEDICINE

## 2021-06-11 PROCEDURE — 85025 COMPLETE CBC W/AUTO DIFF WBC: CPT | Performed by: PHYSICIAN ASSISTANT

## 2021-06-11 PROCEDURE — 63600175 PHARM REV CODE 636 W HCPCS: Performed by: PHYSICIAN ASSISTANT

## 2021-06-11 PROCEDURE — 86803 HEPATITIS C AB TEST: CPT | Performed by: EMERGENCY MEDICINE

## 2021-06-11 PROCEDURE — 99284 PR EMERGENCY DEPT VISIT,LEVEL IV: ICD-10-PCS | Mod: CS,,, | Performed by: PHYSICIAN ASSISTANT

## 2021-06-11 PROCEDURE — 83690 ASSAY OF LIPASE: CPT | Performed by: PHYSICIAN ASSISTANT

## 2021-06-11 PROCEDURE — 81001 URINALYSIS AUTO W/SCOPE: CPT | Performed by: EMERGENCY MEDICINE

## 2021-06-11 PROCEDURE — U0002 COVID-19 LAB TEST NON-CDC: HCPCS | Performed by: PHYSICIAN ASSISTANT

## 2021-06-11 RX ORDER — ONDANSETRON 2 MG/ML
4 INJECTION INTRAMUSCULAR; INTRAVENOUS
Status: COMPLETED | OUTPATIENT
Start: 2021-06-11 | End: 2021-06-11

## 2021-06-11 RX ADMIN — ONDANSETRON 4 MG: 2 INJECTION INTRAMUSCULAR; INTRAVENOUS at 11:06

## 2021-06-11 RX ADMIN — SODIUM CHLORIDE 1000 ML: 0.9 INJECTION, SOLUTION INTRAVENOUS at 11:06

## 2021-06-12 VITALS
HEIGHT: 70 IN | DIASTOLIC BLOOD PRESSURE: 70 MMHG | RESPIRATION RATE: 18 BRPM | BODY MASS INDEX: 21.47 KG/M2 | SYSTOLIC BLOOD PRESSURE: 120 MMHG | WEIGHT: 150 LBS | HEART RATE: 80 BPM | TEMPERATURE: 99 F | OXYGEN SATURATION: 100 %

## 2021-06-12 LAB
ALLENS TEST: ABNORMAL
BACTERIA #/AREA URNS AUTO: NORMAL /HPF
BILIRUB UR QL STRIP: NEGATIVE
BUN SERPL-MCNC: 25 MG/DL (ref 6–30)
CHLORIDE SERPL-SCNC: 103 MMOL/L (ref 95–110)
CLARITY UR REFRACT.AUTO: CLEAR
COLOR UR AUTO: YELLOW
CREAT SERPL-MCNC: 1.5 MG/DL (ref 0.5–1.4)
DELSYS: ABNORMAL
GLUCOSE SERPL-MCNC: 215 MG/DL (ref 70–110)
GLUCOSE UR QL STRIP: ABNORMAL
HCO3 UR-SCNC: 27.8 MMOL/L (ref 24–28)
HCT VFR BLD CALC: 40 %PCV (ref 36–54)
HGB UR QL STRIP: NEGATIVE
HYALINE CASTS UR QL AUTO: 0 /LPF
KETONES UR QL STRIP: ABNORMAL
LEUKOCYTE ESTERASE UR QL STRIP: NEGATIVE
MICROSCOPIC COMMENT: NORMAL
MODE: ABNORMAL
NITRITE UR QL STRIP: NEGATIVE
PCO2 BLDA: 48.7 MMHG (ref 35–45)
PH SMN: 7.37 [PH] (ref 7.35–7.45)
PH UR STRIP: 5 [PH] (ref 5–8)
PO2 BLDA: 18 MMHG (ref 40–60)
POC BE: 2 MMOL/L
POC IONIZED CALCIUM: 1.01 MMOL/L (ref 1.06–1.42)
POC SATURATED O2: 26 % (ref 95–100)
POC TCO2 (MEASURED): 25 MMOL/L (ref 23–29)
POC TCO2: 29 MMOL/L (ref 24–29)
POTASSIUM BLD-SCNC: 4.5 MMOL/L (ref 3.5–5.1)
PROT UR QL STRIP: ABNORMAL
RBC #/AREA URNS AUTO: 0 /HPF (ref 0–4)
SAMPLE: ABNORMAL
SAMPLE: ABNORMAL
SITE: ABNORMAL
SODIUM BLD-SCNC: 141 MMOL/L (ref 136–145)
SP GR UR STRIP: 1.02 (ref 1–1.03)
URN SPEC COLLECT METH UR: ABNORMAL
WBC #/AREA URNS AUTO: 0 /HPF (ref 0–5)

## 2021-06-12 PROCEDURE — 96365 THER/PROPH/DIAG IV INF INIT: CPT

## 2021-06-12 PROCEDURE — 82803 BLOOD GASES ANY COMBINATION: CPT

## 2021-06-12 PROCEDURE — 80047 BASIC METABLC PNL IONIZED CA: CPT

## 2021-06-12 PROCEDURE — 25000003 PHARM REV CODE 250: Performed by: PHYSICIAN ASSISTANT

## 2021-06-12 PROCEDURE — 63600175 PHARM REV CODE 636 W HCPCS: Performed by: PHYSICIAN ASSISTANT

## 2021-06-12 PROCEDURE — 99900035 HC TECH TIME PER 15 MIN (STAT)

## 2021-06-12 RX ORDER — ACETAMINOPHEN 500 MG
1000 TABLET ORAL
Status: COMPLETED | OUTPATIENT
Start: 2021-06-12 | End: 2021-06-12

## 2021-06-12 RX ORDER — PROMETHAZINE HYDROCHLORIDE 25 MG/1
25 TABLET ORAL EVERY 6 HOURS PRN
Qty: 15 TABLET | Refills: 0 | Status: SHIPPED | OUTPATIENT
Start: 2021-06-12 | End: 2022-11-22

## 2021-06-12 RX ADMIN — ACETAMINOPHEN 1000 MG: 500 TABLET, FILM COATED ORAL at 01:06

## 2021-06-12 RX ADMIN — PROMETHAZINE HYDROCHLORIDE 6.25 MG: 25 INJECTION INTRAMUSCULAR; INTRAVENOUS at 12:06

## 2021-06-14 LAB
HCV AB SERPL QL IA: NEGATIVE
HIV 1+2 AB+HIV1 P24 AG SERPL QL IA: NEGATIVE

## 2022-02-03 ENCOUNTER — HOSPITAL ENCOUNTER (EMERGENCY)
Facility: HOSPITAL | Age: 42
Discharge: HOME | End: 2022-02-03
Attending: EMERGENCY MEDICINE
Payer: MEDICARE

## 2022-02-03 VITALS
WEIGHT: 140 LBS | OXYGEN SATURATION: 100 % | DIASTOLIC BLOOD PRESSURE: 90 MMHG | HEART RATE: 65 BPM | RESPIRATION RATE: 18 BRPM | SYSTOLIC BLOOD PRESSURE: 156 MMHG | TEMPERATURE: 98 F | HEIGHT: 70 IN | BODY MASS INDEX: 20.04 KG/M2

## 2022-02-03 DIAGNOSIS — E10.65 UNCONTROLLED TYPE 1 DIABETES MELLITUS WITH HYPERGLYCEMIA: ICD-10-CM

## 2022-02-03 DIAGNOSIS — R11.2 NAUSEA VOMITING AND DIARRHEA: Primary | ICD-10-CM

## 2022-02-03 DIAGNOSIS — R19.7 NAUSEA VOMITING AND DIARRHEA: Primary | ICD-10-CM

## 2022-02-03 LAB
ALBUMIN SERPL BCP-MCNC: 3.7 G/DL (ref 3.5–5.2)
ALLENS TEST: ABNORMAL
ALP SERPL-CCNC: 171 U/L (ref 55–135)
ALT SERPL W/O P-5'-P-CCNC: 33 U/L (ref 10–44)
ANION GAP SERPL CALC-SCNC: 10 MMOL/L (ref 8–16)
AST SERPL-CCNC: 28 U/L (ref 10–40)
B-OH-BUTYR BLD STRIP-SCNC: 0.4 MMOL/L (ref 0–0.5)
BASOPHILS # BLD AUTO: 0.06 K/UL (ref 0–0.2)
BASOPHILS NFR BLD: 0.6 % (ref 0–1.9)
BILIRUB SERPL-MCNC: 0.2 MG/DL (ref 0.1–1)
BUN SERPL-MCNC: 22 MG/DL (ref 6–20)
CALCIUM SERPL-MCNC: 9.8 MG/DL (ref 8.7–10.5)
CHLORIDE SERPL-SCNC: 97 MMOL/L (ref 95–110)
CO2 SERPL-SCNC: 29 MMOL/L (ref 23–29)
CREAT SERPL-MCNC: 1.4 MG/DL (ref 0.5–1.4)
CTP QC/QA: YES
DELSYS: ABNORMAL
DIFFERENTIAL METHOD: ABNORMAL
EOSINOPHIL # BLD AUTO: 0.1 K/UL (ref 0–0.5)
EOSINOPHIL NFR BLD: 1.2 % (ref 0–8)
ERYTHROCYTE [DISTWIDTH] IN BLOOD BY AUTOMATED COUNT: 12.3 % (ref 11.5–14.5)
EST. GFR  (AFRICAN AMERICAN): >60 ML/MIN/1.73 M^2
EST. GFR  (NON AFRICAN AMERICAN): >60 ML/MIN/1.73 M^2
GLUCOSE SERPL-MCNC: 163 MG/DL (ref 70–110)
HCO3 UR-SCNC: 34.8 MMOL/L (ref 24–28)
HCT VFR BLD AUTO: 42.7 % (ref 40–54)
HGB BLD-MCNC: 12.8 G/DL (ref 14–18)
IMM GRANULOCYTES # BLD AUTO: 0.03 K/UL (ref 0–0.04)
IMM GRANULOCYTES NFR BLD AUTO: 0.3 % (ref 0–0.5)
LIPASE SERPL-CCNC: 33 U/L (ref 4–60)
LYMPHOCYTES # BLD AUTO: 1.1 K/UL (ref 1–4.8)
LYMPHOCYTES NFR BLD: 10.7 % (ref 18–48)
MAGNESIUM SERPL-MCNC: 1.7 MG/DL (ref 1.6–2.6)
MCH RBC QN AUTO: 26.2 PG (ref 27–31)
MCHC RBC AUTO-ENTMCNC: 30 G/DL (ref 32–36)
MCV RBC AUTO: 88 FL (ref 82–98)
MODE: ABNORMAL
MONOCYTES # BLD AUTO: 0.6 K/UL (ref 0.3–1)
MONOCYTES NFR BLD: 6 % (ref 4–15)
NEUTROPHILS # BLD AUTO: 8.6 K/UL (ref 1.8–7.7)
NEUTROPHILS NFR BLD: 81.2 % (ref 38–73)
NRBC BLD-RTO: 0 /100 WBC
PCO2 BLDA: 71 MMHG (ref 35–45)
PH SMN: 7.3 [PH] (ref 7.35–7.45)
PLATELET # BLD AUTO: 287 K/UL (ref 150–450)
PMV BLD AUTO: 11.9 FL (ref 9.2–12.9)
PO2 BLDA: 14 MMHG (ref 40–60)
POC BE: 8 MMOL/L
POC SATURATED O2: 13 % (ref 95–100)
POC TCO2: 37 MMOL/L (ref 24–29)
POCT GLUCOSE: 134 MG/DL (ref 70–110)
POTASSIUM SERPL-SCNC: 4.9 MMOL/L (ref 3.5–5.1)
PROT SERPL-MCNC: 9.6 G/DL (ref 6–8.4)
RBC # BLD AUTO: 4.88 M/UL (ref 4.6–6.2)
SAMPLE: ABNORMAL
SARS-COV-2 RDRP RESP QL NAA+PROBE: NEGATIVE
SITE: ABNORMAL
SODIUM SERPL-SCNC: 136 MMOL/L (ref 136–145)
WBC # BLD AUTO: 10.61 K/UL (ref 3.9–12.7)

## 2022-02-03 PROCEDURE — U0002 COVID-19 LAB TEST NON-CDC: HCPCS | Performed by: EMERGENCY MEDICINE

## 2022-02-03 PROCEDURE — 82803 BLOOD GASES ANY COMBINATION: CPT

## 2022-02-03 PROCEDURE — 80053 COMPREHEN METABOLIC PANEL: CPT | Performed by: EMERGENCY MEDICINE

## 2022-02-03 PROCEDURE — 99900035 HC TECH TIME PER 15 MIN (STAT)

## 2022-02-03 PROCEDURE — 25000003 PHARM REV CODE 250: Performed by: EMERGENCY MEDICINE

## 2022-02-03 PROCEDURE — 86803 HEPATITIS C AB TEST: CPT | Performed by: EMERGENCY MEDICINE

## 2022-02-03 PROCEDURE — 85025 COMPLETE CBC W/AUTO DIFF WBC: CPT | Performed by: EMERGENCY MEDICINE

## 2022-02-03 PROCEDURE — 83735 ASSAY OF MAGNESIUM: CPT | Performed by: EMERGENCY MEDICINE

## 2022-02-03 PROCEDURE — 63600175 PHARM REV CODE 636 W HCPCS: Performed by: EMERGENCY MEDICINE

## 2022-02-03 PROCEDURE — 83690 ASSAY OF LIPASE: CPT | Performed by: EMERGENCY MEDICINE

## 2022-02-03 PROCEDURE — 82010 KETONE BODYS QUAN: CPT | Performed by: EMERGENCY MEDICINE

## 2022-02-03 PROCEDURE — 96361 HYDRATE IV INFUSION ADD-ON: CPT

## 2022-02-03 PROCEDURE — 96374 THER/PROPH/DIAG INJ IV PUSH: CPT

## 2022-02-03 PROCEDURE — 87389 HIV-1 AG W/HIV-1&-2 AB AG IA: CPT | Performed by: EMERGENCY MEDICINE

## 2022-02-03 PROCEDURE — 99284 PR EMERGENCY DEPT VISIT,LEVEL IV: ICD-10-PCS | Mod: CS,,, | Performed by: EMERGENCY MEDICINE

## 2022-02-03 PROCEDURE — 99284 EMERGENCY DEPT VISIT MOD MDM: CPT | Mod: CS,,, | Performed by: EMERGENCY MEDICINE

## 2022-02-03 PROCEDURE — 99284 EMERGENCY DEPT VISIT MOD MDM: CPT | Mod: 25

## 2022-02-03 RX ORDER — ONDANSETRON 2 MG/ML
4 INJECTION INTRAMUSCULAR; INTRAVENOUS
Status: COMPLETED | OUTPATIENT
Start: 2022-02-03 | End: 2022-02-03

## 2022-02-03 RX ORDER — LOPERAMIDE HYDROCHLORIDE 2 MG/1
4 CAPSULE ORAL
Status: COMPLETED | OUTPATIENT
Start: 2022-02-03 | End: 2022-02-03

## 2022-02-03 RX ORDER — ONDANSETRON 4 MG/1
4 TABLET, ORALLY DISINTEGRATING ORAL EVERY 6 HOURS PRN
Qty: 20 TABLET | Refills: 0 | Status: SHIPPED | OUTPATIENT
Start: 2022-02-03 | End: 2022-11-22

## 2022-02-03 RX ADMIN — SODIUM CHLORIDE 500 ML: 0.9 INJECTION, SOLUTION INTRAVENOUS at 06:02

## 2022-02-03 RX ADMIN — ONDANSETRON 4 MG: 2 INJECTION INTRAMUSCULAR; INTRAVENOUS at 06:02

## 2022-02-03 RX ADMIN — LOPERAMIDE HYDROCHLORIDE 4 MG: 2 CAPSULE ORAL at 06:02

## 2022-02-03 NOTE — ED PROVIDER NOTES
Encounter Date: 2/3/2022    SCRIBE #1 NOTE: I, Kerrie Stein , am scribing for, and in the presence of,  Rogelio Madrigal MD. I have scribed the following portions of the note - Other sections scribed: HPI ROS PE.       History     Chief Complaint   Patient presents with    Sore Throat     Sore throat abdominal pain and diarrhea onset today     The patient is a 42 y.o. male  who presents to the ED with a complaint of sore throat. Patient relative states he has a history of HTN, DM, and DKA. She states his A1C level was high and his glucose level was high this morning. Patient relative states his glucose level was 400 this morning. Relative notes that he is compliant with insulin which has recently been increased. Patient results for COVID is negative today. Relative reports patient is fully vaccinated with booster. Patient complains of diarrhea, nausea, vomiting, and polyuria. The patients available PMH, PSH, Social History, medications, allergies, and triage vital signs were reviewed just prior to their medical evaluation. A ten point review of systems was completed and is negative except as documented above.  Patient denies any other acute medical complaint.        The history is provided by the patient, medical records and a relative. No  was used.     Review of patient's allergies indicates:  No Known Allergies  Past Medical History:   Diagnosis Date    Anxiety     Depression     Diabetes mellitus     DKA (diabetic ketoacidoses)     Glaucoma     Hyperlipemia     Hypertension     Seizures      Past Surgical History:   Procedure Laterality Date    EYE SURGERY      cataracts - left 2019     History reviewed. No pertinent family history.  Social History     Tobacco Use    Smoking status: Never Smoker    Smokeless tobacco: Never Used   Substance Use Topics    Alcohol use: No    Drug use: No     Review of Systems   Constitutional: Negative for fever.   HENT: Positive for sore  throat.    Eyes: Negative for visual disturbance.   Respiratory: Negative for cough and shortness of breath.    Cardiovascular: Negative for chest pain.   Gastrointestinal: Positive for diarrhea, nausea and vomiting. Negative for abdominal pain.   Endocrine: Positive for polyuria.   Genitourinary: Negative for dysuria.   Musculoskeletal: Negative for neck pain.   Skin: Negative for rash and wound.   Allergic/Immunologic: Negative for immunocompromised state.   Neurological: Negative for syncope.   Psychiatric/Behavioral: Negative for confusion.       Physical Exam     Initial Vitals [02/03/22 1549]   BP Pulse Resp Temp SpO2   134/89 79 18 97.5 °F (36.4 °C) 99 %      MAP       --         Physical Exam    Nursing note and vitals reviewed.  Constitutional: He appears well-developed and well-nourished. He is not diaphoretic. No distress.   HENT:   Head: Normocephalic and atraumatic.   Nose: Nose normal.   Mild pharyngitis with no exudate.    Eyes: Conjunctivae are normal. Right eye exhibits no discharge. Left eye exhibits no discharge.   Neck: Neck supple.   Normal range of motion.  Cardiovascular: Normal rate, regular rhythm and normal heart sounds. Exam reveals no gallop and no friction rub.    No murmur heard.  Pulmonary/Chest: Breath sounds normal. No respiratory distress. He has no wheezes. He has no rhonchi. He has no rales.   Abdominal: Abdomen is soft. He exhibits no distension. There is no abdominal tenderness. There is no rebound and no guarding.   Musculoskeletal:         General: No tenderness or edema. Normal range of motion.      Cervical back: Normal range of motion and neck supple.     Neurological: He is alert and oriented to person, place, and time. GCS score is 15. GCS eye subscore is 4. GCS verbal subscore is 5. GCS motor subscore is 6.   Skin: Skin is warm and dry. No rash noted. No erythema.   Psychiatric: He has a normal mood and affect. His behavior is normal. Judgment and thought content normal.          ED Course   Procedures  Labs Reviewed   CBC W/ AUTO DIFFERENTIAL - Abnormal; Notable for the following components:       Result Value    Hemoglobin 12.8 (*)     MCH 26.2 (*)     MCHC 30.0 (*)     Gran # (ANC) 8.6 (*)     Gran % 81.2 (*)     Lymph % 10.7 (*)     All other components within normal limits   COMPREHENSIVE METABOLIC PANEL - Abnormal; Notable for the following components:    Glucose 163 (*)     BUN 22 (*)     Total Protein 9.6 (*)     Alkaline Phosphatase 171 (*)     All other components within normal limits   POCT GLUCOSE - Abnormal; Notable for the following components:    POCT Glucose 134 (*)     All other components within normal limits   ISTAT PROCEDURE - Abnormal; Notable for the following components:    POC PH 7.299 (*)     POC PCO2 71.0 (*)     POC PO2 14 (*)     POC HCO3 34.8 (*)     POC SATURATED O2 13 (*)     POC TCO2 37 (*)     All other components within normal limits   SARS-COV-2 RDRP GENE - Normal    Narrative:     This test utilizes isothermal nucleic acid amplification   technology to detect the SARS-CoV-2 RdRp nucleic acid segment.   The analytical sensitivity (limit of detection) is 125 genome   equivalents/mL.   A POSITIVE result implies infection with the SARS-CoV-2 virus;   the patient is presumed to be contagious.     A NEGATIVE result means that SARS-CoV-2 nucleic acids are not   present above the limit of detection. A NEGATIVE result should be   treated as presumptive. It does not rule out the possibility of   COVID-19 and should not be the sole basis for treatment decisions.   If COVID-19 is strongly suspected based on clinical and exposure   history, re-testing using an alternate molecular assay should be   considered.   This test is only for use under the Food and Drug   Administration s Emergency Use Authorization (EUA).   Commercial kits are provided by [employer].   Performance characteristics of the EUA have been independently   verified by [facility] of   Pathology and Laboratory Medicine.   _________________________________________________________________   The authorized Fact Sheet for Healthcare Providers and the authorized Fact   Sheet for Patients of the ID NOW COVID-19 are available on the FDA   website:     https://www.fda.gov/media/661268/download  https://www.fda.gov/media/183149/download         LIPASE   MAGNESIUM   BETA - HYDROXYBUTYRATE, SERUM   HIV 1 / 2 ANTIBODY   HEPATITIS C ANTIBODY   POCT GLUCOSE MONITORING CONTINUOUS          Imaging Results    None          Medications   sodium chloride 0.9% bolus 500 mL (0 mLs Intravenous Stopped 2/3/22 1854)   ondansetron injection 4 mg (4 mg Intravenous Given 2/3/22 1816)   loperamide capsule 4 mg (4 mg Oral Given 2/3/22 1816)     Medical Decision Making:   History:   Old Medical Records: I decided to obtain old medical records.  Clinical Tests:   Lab Tests: Ordered and Reviewed  ED Management:  42-year-old male presents with nausea, vomiting, and diarrhea.  Elevated sugar prior to arrival.  Vitals here unremarkable.  Physical exam as above.  Labs unremarkable.  Minimal hyperglycemia.  Doubt DKA.  Viral GI etiology likely.  Treated with fluids, Zofran, and loperamide.  No episodes of vomiting or diarrhea in the ED.  He feels improved.  Will discharge home.  He will continue sliding scale insulin.  Prescribe Zofran.  He will use Pepto and  Imodium over-the-counter.  Patient will return to ED for worsening symptoms, inability to eat/drink, fever greater than 100.4, or any other concerns.  Did bedside teaching with return precautions.  All questions answered.  The patient acknowledges understanding.  Gave verbal discharge instructions.          Scribe Attestation:   Scribe #1: I performed the above scribed service and the documentation accurately describes the services I performed. I attest to the accuracy of the note.                 Clinical Impression:   Final diagnoses:  [R11.2, R19.7]  Nausea vomiting and diarrhea (Primary)  [E10.65] Uncontrolled type 1 diabetes mellitus with hyperglycemia          ED Disposition Condition    Discharge Stable        ED Prescriptions     Medication Sig Dispense Start Date End Date Auth. Provider    ondansetron (ZOFRAN-ODT) 4 MG TbDL Take 1 tablet (4 mg total) by mouth every 6 (six) hours as needed (nausea and vomiting). 20 tablet 2/3/2022  Rogelio Madrigal MD        Follow-up Information     Follow up With Specialties Details Why Contact Info    Follow up with primary physician as soon as possible.  Call tomorrow for an appointment.        [facility] Emergency Medicine  Return to ED for worsening symptoms, inability to eat/drink, fever greater than 100.4, or any other concerns. [address]  [facility] [id number] [phone]        Level of Complexity:  High, level 5.     Rogelio Madrigal MD  02/03/22 2033

## 2022-02-03 NOTE — ED TRIAGE NOTES
Pt is a 41 y/o male that presents to the ED today via personal vehicle from home. Pt c/o new onset vomiting, diarrhea, and sore throat. First episode of diarrhea was approximately noon. Emesis x 1 about 20 minutes ago. Sore throat started upon waking today. Pt denies pain with swallowing. Believes it is related to elevated ketones. Stated that sense of smell is affected and when he tries to eat anything or smells anything he vomits.

## 2022-02-04 LAB
HCV AB SERPL QL IA: NEGATIVE
HIV 1+2 AB+HIV1 P24 AG SERPL QL IA: NEGATIVE

## 2022-02-04 NOTE — DISCHARGE INSTRUCTIONS
Stay well hydrated.  Eat a low sugar diet.  Use insulin sliding scale as ordered.    Our goal in the emergency department is to always give you outstanding care and exceptional service. You may receive a survey by mail or e-mail in the next week regarding your experience in our ED. We would greatly appreciate your completing and returning the survey. Your feedback provides us with a way to recognize our staff who give very good care and it helps us learn how to improve when your experience was below our aspiration of excellence.

## 2022-08-21 ENCOUNTER — HOSPITAL ENCOUNTER (EMERGENCY)
Facility: HOSPITAL | Age: 42
Discharge: HOME | End: 2022-08-22
Attending: EMERGENCY MEDICINE
Payer: MEDICARE

## 2022-08-21 VITALS
HEIGHT: 70 IN | BODY MASS INDEX: 20.04 KG/M2 | OXYGEN SATURATION: 98 % | DIASTOLIC BLOOD PRESSURE: 73 MMHG | HEART RATE: 79 BPM | RESPIRATION RATE: 18 BRPM | SYSTOLIC BLOOD PRESSURE: 129 MMHG | TEMPERATURE: 98 F | WEIGHT: 140 LBS

## 2022-08-21 DIAGNOSIS — R73.9 HYPERGLYCEMIA: ICD-10-CM

## 2022-08-21 DIAGNOSIS — R11.10 VOMITING: ICD-10-CM

## 2022-08-21 DIAGNOSIS — R11.2 NON-INTRACTABLE VOMITING WITH NAUSEA, UNSPECIFIED VOMITING TYPE: Primary | ICD-10-CM

## 2022-08-21 DIAGNOSIS — R31.9 HEMATURIA, UNSPECIFIED TYPE: ICD-10-CM

## 2022-08-21 DIAGNOSIS — R07.9 CHEST PAIN: ICD-10-CM

## 2022-08-21 LAB
ALBUMIN SERPL BCP-MCNC: 3.9 G/DL (ref 3.5–5.2)
ALP SERPL-CCNC: 116 U/L (ref 55–135)
ALT SERPL W/O P-5'-P-CCNC: 41 U/L (ref 10–44)
ANION GAP SERPL CALC-SCNC: 15 MMOL/L (ref 8–16)
AST SERPL-CCNC: 34 U/L (ref 10–40)
BASOPHILS # BLD AUTO: 0.03 K/UL (ref 0–0.2)
BASOPHILS NFR BLD: 0.3 % (ref 0–1.9)
BILIRUB SERPL-MCNC: 0.3 MG/DL (ref 0.1–1)
BUN SERPL-MCNC: 30 MG/DL (ref 6–20)
CALCIUM SERPL-MCNC: 9.7 MG/DL (ref 8.7–10.5)
CHLORIDE SERPL-SCNC: 99 MMOL/L (ref 95–110)
CO2 SERPL-SCNC: 24 MMOL/L (ref 23–29)
CREAT SERPL-MCNC: 1.7 MG/DL (ref 0.5–1.4)
DIFFERENTIAL METHOD: ABNORMAL
EOSINOPHIL # BLD AUTO: 0 K/UL (ref 0–0.5)
EOSINOPHIL NFR BLD: 0.1 % (ref 0–8)
ERYTHROCYTE [DISTWIDTH] IN BLOOD BY AUTOMATED COUNT: 13.4 % (ref 11.5–14.5)
EST. GFR  (NO RACE VARIABLE): 51.3 ML/MIN/1.73 M^2
GLUCOSE SERPL-MCNC: 257 MG/DL (ref 70–110)
HCT VFR BLD AUTO: 45.1 % (ref 40–54)
HGB BLD-MCNC: 14 G/DL (ref 14–18)
IMM GRANULOCYTES # BLD AUTO: 0.03 K/UL (ref 0–0.04)
IMM GRANULOCYTES NFR BLD AUTO: 0.3 % (ref 0–0.5)
LIPASE SERPL-CCNC: 11 U/L (ref 4–60)
LYMPHOCYTES # BLD AUTO: 0.8 K/UL (ref 1–4.8)
LYMPHOCYTES NFR BLD: 7.5 % (ref 18–48)
MCH RBC QN AUTO: 26.6 PG (ref 27–31)
MCHC RBC AUTO-ENTMCNC: 31 G/DL (ref 32–36)
MCV RBC AUTO: 86 FL (ref 82–98)
MONOCYTES # BLD AUTO: 0.5 K/UL (ref 0.3–1)
MONOCYTES NFR BLD: 4.7 % (ref 4–15)
NEUTROPHILS # BLD AUTO: 9.3 K/UL (ref 1.8–7.7)
NEUTROPHILS NFR BLD: 87.1 % (ref 38–73)
NRBC BLD-RTO: 0 /100 WBC
PLATELET # BLD AUTO: 259 K/UL (ref 150–450)
PMV BLD AUTO: 11.7 FL (ref 9.2–12.9)
POTASSIUM SERPL-SCNC: 5.6 MMOL/L (ref 3.5–5.1)
PROT SERPL-MCNC: 9 G/DL (ref 6–8.4)
RBC # BLD AUTO: 5.26 M/UL (ref 4.6–6.2)
SODIUM SERPL-SCNC: 138 MMOL/L (ref 136–145)
TROPONIN I SERPL DL<=0.01 NG/ML-MCNC: <0.006 NG/ML (ref 0–0.03)
WBC # BLD AUTO: 10.64 K/UL (ref 3.9–12.7)

## 2022-08-21 PROCEDURE — 93010 EKG 12-LEAD: ICD-10-PCS | Mod: ,,, | Performed by: INTERNAL MEDICINE

## 2022-08-21 PROCEDURE — 93005 ELECTROCARDIOGRAM TRACING: CPT

## 2022-08-21 PROCEDURE — 99284 EMERGENCY DEPT VISIT MOD MDM: CPT | Mod: ,,, | Performed by: PHYSICIAN ASSISTANT

## 2022-08-21 PROCEDURE — 85025 COMPLETE CBC W/AUTO DIFF WBC: CPT | Performed by: EMERGENCY MEDICINE

## 2022-08-21 PROCEDURE — 99284 PR EMERGENCY DEPT VISIT,LEVEL IV: ICD-10-PCS | Mod: ,,, | Performed by: PHYSICIAN ASSISTANT

## 2022-08-21 PROCEDURE — 96375 TX/PRO/DX INJ NEW DRUG ADDON: CPT

## 2022-08-21 PROCEDURE — 81001 URINALYSIS AUTO W/SCOPE: CPT | Performed by: EMERGENCY MEDICINE

## 2022-08-21 PROCEDURE — 63600175 PHARM REV CODE 636 W HCPCS: Performed by: EMERGENCY MEDICINE

## 2022-08-21 PROCEDURE — 83690 ASSAY OF LIPASE: CPT | Performed by: EMERGENCY MEDICINE

## 2022-08-21 PROCEDURE — 80053 COMPREHEN METABOLIC PANEL: CPT | Performed by: EMERGENCY MEDICINE

## 2022-08-21 PROCEDURE — 96374 THER/PROPH/DIAG INJ IV PUSH: CPT

## 2022-08-21 PROCEDURE — 99285 EMERGENCY DEPT VISIT HI MDM: CPT | Mod: 25

## 2022-08-21 PROCEDURE — 25000003 PHARM REV CODE 250: Performed by: PHYSICIAN ASSISTANT

## 2022-08-21 PROCEDURE — 84484 ASSAY OF TROPONIN QUANT: CPT | Performed by: PHYSICIAN ASSISTANT

## 2022-08-21 PROCEDURE — 63600175 PHARM REV CODE 636 W HCPCS: Performed by: PHYSICIAN ASSISTANT

## 2022-08-21 PROCEDURE — 93010 ELECTROCARDIOGRAM REPORT: CPT | Mod: ,,, | Performed by: INTERNAL MEDICINE

## 2022-08-21 PROCEDURE — 96361 HYDRATE IV INFUSION ADD-ON: CPT

## 2022-08-21 RX ORDER — FAMOTIDINE 10 MG/ML
20 INJECTION INTRAVENOUS
Status: COMPLETED | OUTPATIENT
Start: 2022-08-21 | End: 2022-08-21

## 2022-08-21 RX ORDER — ONDANSETRON 2 MG/ML
4 INJECTION INTRAMUSCULAR; INTRAVENOUS
Status: COMPLETED | OUTPATIENT
Start: 2022-08-21 | End: 2022-08-21

## 2022-08-21 RX ORDER — PROCHLORPERAZINE EDISYLATE 5 MG/ML
10 INJECTION INTRAMUSCULAR; INTRAVENOUS
Status: COMPLETED | OUTPATIENT
Start: 2022-08-21 | End: 2022-08-21

## 2022-08-21 RX ADMIN — PROCHLORPERAZINE EDISYLATE 10 MG: 5 INJECTION INTRAMUSCULAR; INTRAVENOUS at 10:08

## 2022-08-21 RX ADMIN — SODIUM CHLORIDE 1000 ML: 0.9 INJECTION, SOLUTION INTRAVENOUS at 10:08

## 2022-08-21 RX ADMIN — FAMOTIDINE 20 MG: 10 INJECTION, SOLUTION INTRAVENOUS at 10:08

## 2022-08-21 RX ADMIN — ONDANSETRON 4 MG: 2 INJECTION INTRAMUSCULAR; INTRAVENOUS at 08:08

## 2022-08-22 LAB
BACTERIA #/AREA URNS AUTO: NORMAL /HPF
BILIRUB UR QL STRIP: NEGATIVE
BUN SERPL-MCNC: 30 MG/DL (ref 6–30)
CHLORIDE SERPL-SCNC: 103 MMOL/L (ref 95–110)
CLARITY UR REFRACT.AUTO: CLEAR
COLOR UR AUTO: YELLOW
CREAT SERPL-MCNC: 1.4 MG/DL (ref 0.5–1.4)
GLUCOSE SERPL-MCNC: 296 MG/DL (ref 70–110)
GLUCOSE UR QL STRIP: ABNORMAL
HCT VFR BLD CALC: 43 %PCV (ref 36–54)
HGB UR QL STRIP: ABNORMAL
HYALINE CASTS UR QL AUTO: 0 /LPF
KETONES UR QL STRIP: ABNORMAL
LEUKOCYTE ESTERASE UR QL STRIP: NEGATIVE
MICROSCOPIC COMMENT: NORMAL
NITRITE UR QL STRIP: NEGATIVE
PH UR STRIP: 5 [PH] (ref 5–8)
POC IONIZED CALCIUM: 1.07 MMOL/L (ref 1.06–1.42)
POC TCO2 (MEASURED): 22 MMOL/L (ref 23–29)
POTASSIUM BLD-SCNC: 4.9 MMOL/L (ref 3.5–5.1)
PROT UR QL STRIP: ABNORMAL
RBC #/AREA URNS AUTO: 0 /HPF (ref 0–4)
SAMPLE: ABNORMAL
SODIUM BLD-SCNC: 140 MMOL/L (ref 136–145)
SP GR UR STRIP: 1.02 (ref 1–1.03)
URN SPEC COLLECT METH UR: ABNORMAL
WBC #/AREA URNS AUTO: 0 /HPF (ref 0–5)
YEAST UR QL AUTO: NORMAL

## 2022-08-22 RX ORDER — ONDANSETRON 4 MG/1
4 TABLET, ORALLY DISINTEGRATING ORAL EVERY 8 HOURS PRN
Qty: 12 TABLET | Refills: 0 | Status: SHIPPED | OUTPATIENT
Start: 2022-08-22 | End: 2022-11-22

## 2022-08-22 NOTE — DISCHARGE INSTRUCTIONS
Your lab work today reveals elevated blood sugar.  Your kidney function is at baseline.  Your cardiac enzymes are negative.  Your chest x-ray does not show any significant abnormalities.  Take Zofran as needed for nausea.  Follow-up closely with your primary doctor or return to the emergency department sooner for any new or worsening symptoms.

## 2022-08-22 NOTE — ED NOTES
Pt. dc'd home all dc information reviewed with Pt. verbalizedn understanding Pt. Ambulated to exit

## 2022-08-22 NOTE — ED PROVIDER NOTES
"Encounter Date: 8/21/2022       History     Chief Complaint   Patient presents with    Vomiting     States "I've been throwing up since Saturday". Denies diarrhea, fever     42-year-old male with past medical history of anxiety, depression, DM, hypertension, hyperlipidemia, seizure disorder presents to the emergency department with chief complain of nausea and vomiting that began yesterday.  He has been unable to tolerate p.o..  He reports multiple episodes of nonbloody emesis.  He has since developed soreness in his throat and soreness to the center of his chest.  He has no shortness of breath or abdominal pain.  He denies diarrhea.  His last bowel movements today and was normal.  He denies history of abdominal surgeries.  He denies fevers or chills.  He denies other worsening or alleviating factors.        Review of patient's allergies indicates:  No Known Allergies  Past Medical History:   Diagnosis Date    Anxiety     Depression     Diabetes mellitus     DKA (diabetic ketoacidoses)     Glaucoma     Hyperlipemia     Hypertension     Seizures      Past Surgical History:   Procedure Laterality Date    EYE SURGERY      cataracts - left 2019     History reviewed. No pertinent family history.  Social History     Tobacco Use    Smoking status: Never Smoker    Smokeless tobacco: Never Used   Substance Use Topics    Alcohol use: No    Drug use: No     Review of Systems   Constitutional: Negative for fever.   HENT: Positive for sore throat.    Respiratory: Negative for shortness of breath.    Cardiovascular: Positive for chest pain.   Gastrointestinal: Positive for nausea and vomiting.   Genitourinary: Negative for dysuria.   Musculoskeletal: Negative for back pain.   Skin: Negative for rash.   Neurological: Negative for weakness.   Hematological: Does not bruise/bleed easily.       Physical Exam     Initial Vitals [08/21/22 1959]   BP Pulse Resp Temp SpO2   129/73 79 18 97.6 °F (36.4 °C) 98 %      MAP     "   --         Physical Exam    Nursing note and vitals reviewed.  Constitutional: He appears well-developed and well-nourished. He is not diaphoretic. No distress.   HENT:   Head: Normocephalic and atraumatic.   Mouth/Throat: Oropharynx is clear and moist.   Eyes: EOM are normal. Pupils are equal, round, and reactive to light.   Neck: Neck supple.   Normal range of motion.  Cardiovascular: Normal rate, regular rhythm, normal heart sounds and intact distal pulses. Exam reveals no gallop and no friction rub.    No murmur heard.  Pulmonary/Chest: Breath sounds normal. He has no wheezes. He has no rhonchi. He has no rales.   Abdominal: Abdomen is soft. Bowel sounds are normal. There is no abdominal tenderness. There is no rebound and no guarding.   Musculoskeletal:         General: Normal range of motion.      Cervical back: Normal range of motion and neck supple.     Neurological: He is alert and oriented to person, place, and time. He has normal strength. GCS score is 15. GCS eye subscore is 4. GCS verbal subscore is 5. GCS motor subscore is 6.   Skin: Skin is warm and dry. Capillary refill takes less than 2 seconds.   Psychiatric: He has a normal mood and affect. His behavior is normal. Judgment and thought content normal.         ED Course   Procedures  Labs Reviewed   CBC W/ AUTO DIFFERENTIAL - Abnormal; Notable for the following components:       Result Value    MCH 26.6 (*)     MCHC 31.0 (*)     Gran # (ANC) 9.3 (*)     Lymph # 0.8 (*)     Gran % 87.1 (*)     Lymph % 7.5 (*)     All other components within normal limits    Narrative:     For upper or mid abdominal pain.   COMPREHENSIVE METABOLIC PANEL - Abnormal; Notable for the following components:    Potassium 5.6 (*)     Glucose 257 (*)     BUN 30 (*)     Creatinine 1.7 (*)     Total Protein 9.0 (*)     eGFR 51.3 (*)     All other components within normal limits    Narrative:     For upper or mid abdominal pain.   URINALYSIS, REFLEX TO URINE CULTURE -  "Abnormal; Notable for the following components:    Protein, UA 1+ (*)     Glucose, UA 4+ (*)     Ketones, UA 2+ (*)     Occult Blood UA Trace (*)     All other components within normal limits    Narrative:     In and Out Cath as needed it patient unable to void  Specimen Source->Urine   ISTAT PROCEDURE - Abnormal; Notable for the following components:    POC Glucose 296 (*)     POC TCO2 (MEASURED) 22 (*)     All other components within normal limits   LIPASE    Narrative:     For upper or mid abdominal pain.   TROPONIN I   URINALYSIS MICROSCOPIC    Narrative:     In and Out Cath as needed it patient unable to void  Specimen Source->Urine   ISTAT CHEM8     EKG Readings: (Independently Interpreted)   Initial Reading: No STEMI. Rhythm: Normal Sinus Rhythm. Heart Rate: 80.       Imaging Results          X-Ray Chest PA And Lateral (Final result)  Result time 08/21/22 23:02:18    Final result by Benji Diaz MD (08/21/22 23:02:18)                 Impression:      No acute cardiopulmonary finding.      Electronically signed by: Benji Diaz MD  Date:    08/21/2022  Time:    23:02             Narrative:    EXAMINATION:  XR CHEST PA AND LATERAL    CLINICAL HISTORY:  Provided history is "  Vomiting, unspecified".    TECHNIQUE:  Frontal and lateral views of the chest were performed.    COMPARISON:  08/17/2019.    FINDINGS:  Cardiac silhouette is not enlarged. No focal consolidation.  No sizable pleural effusion.  No pneumothorax.                                 Medications   ondansetron injection 4 mg (4 mg Intravenous Given 8/21/22 2040)   sodium chloride 0.9% bolus 1,000 mL (1,000 mLs Intravenous New Bag 8/21/22 2235)   prochlorperazine injection Soln 10 mg (10 mg Intravenous Given 8/21/22 2235)   famotidine (PF) injection 20 mg (20 mg Intravenous Given 8/21/22 2235)     Medical Decision Making:   History:   Old Medical Records: I decided to obtain old medical records.  Initial Assessment:   Emergent evaluation of a " 42-year-old male who presents to the emergency department with chief complaint of nausea and vomiting began yesterday.  He has been unable to tolerate p.o..  He also complains of a sore throat and pain in his chest that has been constant since this morning.  Patient is afebrile, hemodynamically stable, nontoxic appearing.  Will order labs, imaging, analgesia, fluids, and continue to monitor.  Differential Diagnosis:   Differential diagnosis includes but is not limited to gastritis, pancreatitis, esophagitis, metabolic derangement.  Independently Interpreted Test(s):   I have ordered and independently interpreted X-rays - see prior notes.  I have ordered and independently interpreted EKG Reading(s) - see prior notes  Clinical Tests:   Lab Tests: Ordered and Reviewed  Radiological Study: Ordered and Reviewed  Medical Tests: Ordered and Reviewed  ED Management:  UA with occult blood.  No evidence of infection.  Patient advised to follow-up for repeat urinalysis.  No severe hematologic derangement.  Creatinine 1.7.  This is slightly higher than baseline.  Will repeat i-STAT after fluids.  Hyperglycemia of 257.  Lipase 11.  Troponin negative.  Chest x-ray without acute abnormality.  Repeat creatinine 1.4.  This is near patient's baseline.  Potassium of 4.9.  He reports improvement of his symptoms with ED management.  He is now tolerating p.o..  Will discharge with Zofran as needed for nausea.  Return precautions given.  All questions answered.  The patient was instructed to follow up with a primary care provider or to return to the emergency department for worsening symptoms. The treatment plan was discussed with the patient who demonstrated understanding and comfort with plan.              ED Course as of 08/22/22 0050   Mon Aug 22, 2022   0000 WBC: 10.64 [JM]   0000 Hemoglobin: 14.0 [JM]   0000 Hematocrit: 45.1 [JM]   0000 Platelets: 259 [JM]   0000 Glucose(!): 257 [JM]   0000 BUN(!): 30 [JM]   0000 Creatinine(!): 1.7  [JM]   0000 Anion Gap: 15 [JM]   0001 Lipase: 11 [JM]   0001 Troponin I: <0.006 [JM]   0026 POC Potassium: 4.9 [JM]      ED Course User Index  [JM] Miriam Frias PA-C             Clinical Impression:   Final diagnoses:  [R07.9] Chest pain  [R11.10] Vomiting  [R11.2] Non-intractable vomiting with nausea, unspecified vomiting type (Primary)  [R73.9] Hyperglycemia  [R31.9] Hematuria, unspecified type          ED Disposition Condition    Discharge Stable        ED Prescriptions     Medication Sig Dispense Start Date End Date Auth. Provider    ondansetron (ZOFRAN-ODT) 4 MG TbDL Take 1 tablet (4 mg total) by mouth every 8 (eight) hours as needed (nausea). 12 tablet 8/22/2022  Miriam Frias PA-C        Follow-up Information     Follow up With Specialties Details Why Contact Info    [facility] - Emergency Dept Emergency Medicine Go to  If symptoms worsen [address]  [facility] [id number] [phone]    Ingrid Madrigal MD Cardiology Schedule an appointment as soon as possible for a visit in 1 week  [address] [facility] [id number]  [phone]             Miriam Frias PA-C  08/22/22 0050

## 2022-08-22 NOTE — ED NOTES
I-STAT Chem-8+ Results:   Value Reference Range   Sodium 140 136-145 mmol/L   Potassium  4.9 3.5-5.1 mmol/L   Chloride 103  mmol/L   Ionized Calcium 1.07 1.06-1.42 mmol/L   CO2 (measured) 22 23-29 mmol/L   Glucose 296  mg/dL   BUN 30 6-30 mg/dL   Creatinine 1.4 0.5-1.4 mg/dL   Hematocrit 43 36-54%

## 2022-08-22 NOTE — ED TRIAGE NOTES
"Pavel Field, a 42 y.o. male presents to the ED w/ complaint of vomiting multiple times, started Saturday afternoon. Last oral intake was 1500 today, pt was not able to keep it down. Denies blood in emesis.     Triage note:  Chief Complaint   Patient presents with    Vomiting     States "I've been throwing up since Saturday". Denies diarrhea, fever     Review of patient's allergies indicates:  No Known Allergies  Past Medical History:   Diagnosis Date    Anxiety     Depression     Diabetes mellitus     DKA (diabetic ketoacidoses)     Glaucoma     Hyperlipemia     Hypertension     Seizures        "

## 2022-10-10 ENCOUNTER — HOSPITAL ENCOUNTER (INPATIENT)
Facility: HOSPITAL | Age: 42
LOS: 3 days | Discharge: HOME HEALTH | DRG: 493 | End: 2022-10-14
Attending: EMERGENCY MEDICINE | Admitting: INTERNAL MEDICINE
Payer: MEDICARE

## 2022-10-10 DIAGNOSIS — M25.512 LEFT SHOULDER PAIN: ICD-10-CM

## 2022-10-10 DIAGNOSIS — S43.015A ANTERIOR DISLOCATION OF LEFT SHOULDER, INITIAL ENCOUNTER: ICD-10-CM

## 2022-10-10 DIAGNOSIS — M00.9 PYOGENIC ARTHRITIS OF LEFT SHOULDER REGION, DUE TO UNSPECIFIED ORGANISM: Primary | ICD-10-CM

## 2022-10-10 DIAGNOSIS — R07.9 CHEST PAIN: ICD-10-CM

## 2022-10-10 DIAGNOSIS — M00.9: ICD-10-CM

## 2022-10-10 LAB
ALBUMIN SERPL BCP-MCNC: 2.3 G/DL (ref 3.5–5.2)
ALP SERPL-CCNC: 115 U/L (ref 55–135)
ALT SERPL W/O P-5'-P-CCNC: 20 U/L (ref 10–44)
ANION GAP SERPL CALC-SCNC: 7 MMOL/L (ref 8–16)
AST SERPL-CCNC: 18 U/L (ref 10–40)
BASOPHILS # BLD AUTO: 0.03 K/UL (ref 0–0.2)
BASOPHILS NFR BLD: 0.4 % (ref 0–1.9)
BILIRUB SERPL-MCNC: 0.2 MG/DL (ref 0.1–1)
BUN SERPL-MCNC: 19 MG/DL (ref 6–20)
CALCIUM SERPL-MCNC: 8.9 MG/DL (ref 8.7–10.5)
CHLORIDE SERPL-SCNC: 99 MMOL/L (ref 95–110)
CO2 SERPL-SCNC: 26 MMOL/L (ref 23–29)
CREAT SERPL-MCNC: 1.4 MG/DL (ref 0.5–1.4)
CRP SERPL-MCNC: 137.1 MG/L (ref 0–8.2)
DIFFERENTIAL METHOD: ABNORMAL
EOSINOPHIL # BLD AUTO: 0.1 K/UL (ref 0–0.5)
EOSINOPHIL NFR BLD: 0.8 % (ref 0–8)
ERYTHROCYTE [DISTWIDTH] IN BLOOD BY AUTOMATED COUNT: 13.1 % (ref 11.5–14.5)
ERYTHROCYTE [SEDIMENTATION RATE] IN BLOOD BY PHOTOMETRIC METHOD: 104 MM/HR (ref 0–23)
EST. GFR  (NO RACE VARIABLE): >60 ML/MIN/1.73 M^2
GLUCOSE SERPL-MCNC: 267 MG/DL (ref 70–110)
HCT VFR BLD AUTO: 35.4 % (ref 40–54)
HGB BLD-MCNC: 10.4 G/DL (ref 14–18)
IMM GRANULOCYTES # BLD AUTO: 0.03 K/UL (ref 0–0.04)
IMM GRANULOCYTES NFR BLD AUTO: 0.4 % (ref 0–0.5)
LYMPHOCYTES # BLD AUTO: 1.5 K/UL (ref 1–4.8)
LYMPHOCYTES NFR BLD: 17.9 % (ref 18–48)
MCH RBC QN AUTO: 25.7 PG (ref 27–31)
MCHC RBC AUTO-ENTMCNC: 29.4 G/DL (ref 32–36)
MCV RBC AUTO: 88 FL (ref 82–98)
MONOCYTES # BLD AUTO: 0.8 K/UL (ref 0.3–1)
MONOCYTES NFR BLD: 9.2 % (ref 4–15)
NEUTROPHILS # BLD AUTO: 6.1 K/UL (ref 1.8–7.7)
NEUTROPHILS NFR BLD: 71.3 % (ref 38–73)
NRBC BLD-RTO: 0 /100 WBC
PLATELET # BLD AUTO: 420 K/UL (ref 150–450)
PMV BLD AUTO: 10.1 FL (ref 9.2–12.9)
POCT GLUCOSE: 161 MG/DL (ref 70–110)
POTASSIUM SERPL-SCNC: 4.5 MMOL/L (ref 3.5–5.1)
PROT SERPL-MCNC: 8.3 G/DL (ref 6–8.4)
RBC # BLD AUTO: 4.04 M/UL (ref 4.6–6.2)
SODIUM SERPL-SCNC: 132 MMOL/L (ref 136–145)
WBC # BLD AUTO: 8.5 K/UL (ref 3.9–12.7)

## 2022-10-10 PROCEDURE — 23650 PR CLOSED RX SHLDR DISLOCATION: ICD-10-PCS | Mod: 54,LT,, | Performed by: STUDENT IN AN ORGANIZED HEALTH CARE EDUCATION/TRAINING PROGRAM

## 2022-10-10 PROCEDURE — 85652 RBC SED RATE AUTOMATED: CPT | Performed by: STUDENT IN AN ORGANIZED HEALTH CARE EDUCATION/TRAINING PROGRAM

## 2022-10-10 PROCEDURE — 63600175 PHARM REV CODE 636 W HCPCS: Performed by: EMERGENCY MEDICINE

## 2022-10-10 PROCEDURE — 80053 COMPREHEN METABOLIC PANEL: CPT | Performed by: STUDENT IN AN ORGANIZED HEALTH CARE EDUCATION/TRAINING PROGRAM

## 2022-10-10 PROCEDURE — 25000003 PHARM REV CODE 250: Performed by: EMERGENCY MEDICINE

## 2022-10-10 PROCEDURE — 99285 EMERGENCY DEPT VISIT HI MDM: CPT | Mod: 25

## 2022-10-10 PROCEDURE — 99152 MOD SED SAME PHYS/QHP 5/>YRS: CPT | Mod: ,,, | Performed by: STUDENT IN AN ORGANIZED HEALTH CARE EDUCATION/TRAINING PROGRAM

## 2022-10-10 PROCEDURE — 99285 PR EMERGENCY DEPT VISIT,LEVEL V: ICD-10-PCS | Mod: 57,,, | Performed by: EMERGENCY MEDICINE

## 2022-10-10 PROCEDURE — 23650 CLTX SHO DSLC W/MNPJ WO ANES: CPT | Mod: 54,LT,, | Performed by: STUDENT IN AN ORGANIZED HEALTH CARE EDUCATION/TRAINING PROGRAM

## 2022-10-10 PROCEDURE — 86140 C-REACTIVE PROTEIN: CPT | Performed by: STUDENT IN AN ORGANIZED HEALTH CARE EDUCATION/TRAINING PROGRAM

## 2022-10-10 PROCEDURE — 99152 PR MOD CONSCIOUS SEDATION, SAME PHYS, 5+ YRS, FIRST 15 MIN: ICD-10-PCS | Mod: ,,, | Performed by: STUDENT IN AN ORGANIZED HEALTH CARE EDUCATION/TRAINING PROGRAM

## 2022-10-10 PROCEDURE — 85025 COMPLETE CBC W/AUTO DIFF WBC: CPT | Performed by: STUDENT IN AN ORGANIZED HEALTH CARE EDUCATION/TRAINING PROGRAM

## 2022-10-10 PROCEDURE — 63600175 PHARM REV CODE 636 W HCPCS: Performed by: STUDENT IN AN ORGANIZED HEALTH CARE EDUCATION/TRAINING PROGRAM

## 2022-10-10 PROCEDURE — 96374 THER/PROPH/DIAG INJ IV PUSH: CPT

## 2022-10-10 PROCEDURE — 99285 EMERGENCY DEPT VISIT HI MDM: CPT | Mod: 57,,, | Performed by: EMERGENCY MEDICINE

## 2022-10-10 RX ORDER — ONDANSETRON 2 MG/ML
INJECTION INTRAMUSCULAR; INTRAVENOUS
Status: DISPENSED
Start: 2022-10-10 | End: 2022-10-11

## 2022-10-10 RX ORDER — PROPOFOL 10 MG/ML
VIAL (ML) INTRAVENOUS
Status: DISPENSED
Start: 2022-10-10 | End: 2022-10-11

## 2022-10-10 RX ORDER — ONDANSETRON 2 MG/ML
INJECTION INTRAMUSCULAR; INTRAVENOUS CODE/TRAUMA/SEDATION MEDICATION
Status: COMPLETED | OUTPATIENT
Start: 2022-10-10 | End: 2022-10-10

## 2022-10-10 RX ORDER — LIDOCAINE 50 MG/G
1 PATCH TOPICAL
Status: COMPLETED | OUTPATIENT
Start: 2022-10-10 | End: 2022-10-11

## 2022-10-10 RX ORDER — MORPHINE SULFATE 4 MG/ML
4 INJECTION, SOLUTION INTRAMUSCULAR; INTRAVENOUS
Status: COMPLETED | OUTPATIENT
Start: 2022-10-10 | End: 2022-10-10

## 2022-10-10 RX ORDER — PROPOFOL 10 MG/ML
VIAL (ML) INTRAVENOUS CODE/TRAUMA/SEDATION MEDICATION
Status: COMPLETED | OUTPATIENT
Start: 2022-10-10 | End: 2022-10-10

## 2022-10-10 RX ORDER — IBUPROFEN 600 MG/1
600 TABLET ORAL
Status: COMPLETED | OUTPATIENT
Start: 2022-10-10 | End: 2022-10-10

## 2022-10-10 RX ORDER — PROPOFOL 10 MG/ML
100 VIAL (ML) INTRAVENOUS
Status: COMPLETED | OUTPATIENT
Start: 2022-10-10 | End: 2022-10-10

## 2022-10-10 RX ADMIN — MORPHINE SULFATE 4 MG: 4 INJECTION INTRAVENOUS at 04:10

## 2022-10-10 RX ADMIN — IBUPROFEN 600 MG: 600 TABLET ORAL at 01:10

## 2022-10-10 RX ADMIN — ONDANSETRON 4 MG: 2 INJECTION INTRAMUSCULAR; INTRAVENOUS at 06:10

## 2022-10-10 RX ADMIN — PROPOFOL 40 MG: 10 INJECTION, EMULSION INTRAVENOUS at 06:10

## 2022-10-10 RX ADMIN — LIDOCAINE 1 PATCH: 50 PATCH CUTANEOUS at 01:10

## 2022-10-10 NOTE — ED PROVIDER NOTES
Encounter Date: 10/10/2022    SCRIBE #1 NOTE: I, Amara Garcia, am scribing for, and in the presence of,  Douglas Magallon MD. I have scribed the following portions of the note - Other sections scribed: KATE SMART.     History     Chief Complaint   Patient presents with    Shoulder Pain     X 2 weeks, denies falls or trauma.      41 yo M with extensive pmhx including anxiety, depression, IDDM, HLD, HTN, seizures, developmental delay presents with a chief complaint of left shoulder pain.  History is assisted by the patient's caregiver.  Patient has 24 hour caregivers.  He has been complaining of left shoulder pain for the past 2.5 weeks.  Pain is constant.  Pain is worsened with changes in weather and movement.  No trauma or increased use.  Patient is suspicious that he may have slept on it wrong.  Prior to this time, he has had no previous shoulder issues.  He has tried taking Tylenol Arthritis and Biofreeze without resolution of symptoms.  No fevers.  No history of gout.  No history of IVDU.    The history is provided by the patient, medical records and a caregiver. No  was used.   Review of patient's allergies indicates:  No Known Allergies  Past Medical History:   Diagnosis Date    Anxiety     Depression     Diabetes mellitus     DKA (diabetic ketoacidoses)     Glaucoma     Hyperlipemia     Hypertension     Seizures      Past Surgical History:   Procedure Laterality Date    EYE SURGERY      cataracts - left 2019     History reviewed. No pertinent family history.  Social History     Tobacco Use    Smoking status: Never    Smokeless tobacco: Never   Substance Use Topics    Alcohol use: No    Drug use: No     Review of Systems   Constitutional:  Negative for fever.   Respiratory:  Negative for shortness of breath.    Cardiovascular:  Negative for chest pain.   Gastrointestinal:  Negative for abdominal pain.   Musculoskeletal:         + Left shoulder pain   Skin:  Negative for color change.    Neurological:  Negative for weakness and numbness.     Physical Exam     Initial Vitals [10/10/22 1234]   BP Pulse Resp Temp SpO2   118/79 84 16 97.6 °F (36.4 °C) 100 %      MAP       --         Physical Exam    Nursing note and vitals reviewed.  Constitutional: He appears well-developed and well-nourished. He is not diaphoretic. No distress.   HENT:   Head: Normocephalic.   Eyes: No scleral icterus.   Cardiovascular:  Normal rate.           Pulmonary/Chest: No stridor. No respiratory distress.   Abdominal: Abdomen is soft. He exhibits no distension. There is no rebound.   Musculoskeletal:      Left shoulder: Deformity and tenderness present. No swelling, effusion, bony tenderness or crepitus. Decreased range of motion. Normal strength. Normal pulse.      Comments: Left shoulder non-rigid but severely reduced range of motion, unable to abduct  + Tenderness along anterior deltoid  Pain with passive range of motion.     Neurological: He is alert. He has normal strength.   Sensation preserved throughout bilateral upper extremities  Symmetric  strength bilaterally   Skin: Skin is warm.   Skin normal overlying left shoulder       ED Course   Procedures  Labs Reviewed   POCT GLUCOSE - Abnormal; Notable for the following components:       Result Value    POCT Glucose 161 (*)     All other components within normal limits          Imaging Results               X-Ray Shoulder Trauma Left (Final result)  Result time 10/10/22 14:07:44      Final result by Stephen Jordan MD (10/10/22 14:07:44)                   Impression:      Anterior inferior shoulder dislocation versus subluxation.    This report was flagged in Epic as abnormal.      Electronically signed by: Stephen Jordan MD  Date:    10/10/2022  Time:    14:07               Narrative:    EXAMINATION:  XR SHOULDER TRAUMA 3 VIEW LEFT    CLINICAL HISTORY:  Pain in left shoulder    TECHNIQUE:  Three views of the left shoulder were  performed.    COMPARISON  None    FINDINGS:  Bones are well mineralized.  The humeral head is positioned inferiorly and anteriorly with respect to the glenoid.  Findings suggest anterior shoulder dislocation vs. subluxation.  The AC joint appears intact.  No fracture is identified.                                       Medications   LIDOcaine 5 % patch 1 patch (1 patch Transdermal Patch Applied 10/10/22 1317)   morphine injection 4 mg (has no administration in time range)   ibuprofen tablet 600 mg (600 mg Oral Given 10/10/22 1316)     Medical Decision Making:   History:   Old Medical Records: I decided to obtain old medical records.  Initial Assessment:   43 yo M with extensive pmhx including anxiety, depression, IDDM, HLD, HTN, seizures, developmental delay presents with a chief complaint of left shoulder pain.   Differential Diagnosis:   Fracture, dislocation Overuse injury, rotator cuff tendonopathy/tear, osseous pathology    Patient with some guarding to that left shoulder but it is not rigid, I do not suspect gout.  No fevers, rigidity of joint, I doubt septic joint.     Clinical Tests:   Radiological Study: Ordered  ED Management:  Will administer NSAID and Lidoderm patch.  Will obtain plain films.    Reassessment: Plain films reveal Anterior inferior shoulder dislocation.  Patient informed of findings.  On further discussion, patient does admit to falling in the bathtub.  He has no additional signs of trauma.  Patient was subsequently escalated to in ED bed and signed out to another provider for reduction.        Scribe Attestation:   Scribe #1: I performed the above scribed service and the documentation accurately describes the services I performed. I attest to the accuracy of the note.    Attending Attestation:           Physician Attestation for Scribe:      Comments: I, Dr. Douglas Magallon, personally performed the services described in this documentation. All medical record entries made by the scribe were  at my direction and in my presence.  I have reviewed the chart and agree that the record reflects my personal performance and is accurate and complete. Douglas Magallon MD.  3:35 PM 10/10/2022                       Clinical Impression:   Final diagnoses:  [M25.512] Left shoulder pain  [S43.015A] Anterior dislocation of left shoulder, initial encounter (Primary)             Douglas Magallon MD  10/10/22 [id number]

## 2022-10-10 NOTE — ED NOTES
Patient identifiers verified and correct for Mr Eason  C/C: Left shoulder pain SEE NN  APPEARANCE: awake and alert in NAD.  SKIN: warm, dry and intact. No breakdown or bruising.  MUSCULOSKELETAL: Patient moving all extremities spontaneously, no obvious swelling or deformities noted. Ambulates independently.  RESPIRATORY: Denies shortness of breath.Respirations unlabored.   CARDIAC: Denies CP, 2+ distal pulses; no peripheral edema  ABDOMEN: S/ND/NT, Denies nausea  : voids spontaneously, denies difficulty  Neurologic: AAO x 4; follows commands equal strength in all extremities; denies numbness/tingling. Denies dizziness Denies weakness, reports left shouklder pain

## 2022-10-10 NOTE — ED NOTES
Patient identifiers verified and correct for aPvel Field  LOC: The patient is awake, alert and aware of environment with an appropriate affect, the patient is oriented x 3 and speaking appropriately.   APPEARANCE: Patient appears comfortable and in no acute distress, patient is clean and well groomed.  SKIN: The skin is warm and dry, color consistent with ethnicity, patient has normal skin turgor and moist mucus membranes, skin intact, no breakdown or bruising noted.   MUSCULOSKELETAL: Patient moving all extremities spontaneously, no swelling noted. Pt c/o left shoulder pain.   RESPIRATORY: Airway is open and patent, respirations are spontaneous, patient has a normal effort and rate, no accessory muscle use noted, pt placed on continuous pulse ox with O2 sats noted at 99% on room air.  CARDIAC: Patient has a normal rate, no edema noted, capillary refill < 3 seconds.   GASTRO: Soft and non tender to palpation, no distention noted, normoactive bowel sounds present in all four quadrants. Pt states bowel movements have been regular.  : Pt denies any pain or frequency with urination.  NEURO: Pt opens eyes spontaneously, behavior appropriate to situation, follows commands, facial expression symmetrical, bilateral hand grasp equal and even, purposeful motor response noted, normal sensation in all extremities when touched with a finger.

## 2022-10-10 NOTE — ED NOTES
Patient states left shoulder pain x 2 weeks, has Dexacom in place=184, deneis injury. No OTC meds today

## 2022-10-11 ENCOUNTER — ANESTHESIA EVENT (OUTPATIENT)
Dept: SURGERY | Facility: HOSPITAL | Age: 42
DRG: 493 | End: 2022-10-11
Payer: MEDICARE

## 2022-10-11 ENCOUNTER — ANESTHESIA (OUTPATIENT)
Dept: SURGERY | Facility: HOSPITAL | Age: 42
DRG: 493 | End: 2022-10-11
Payer: MEDICARE

## 2022-10-11 PROBLEM — M00.9 SEPTIC ARTHRITIS OF SHOULDER, LEFT: Status: ACTIVE | Noted: 2022-10-11

## 2022-10-11 PROBLEM — M25.512 LEFT SHOULDER PAIN: Status: ACTIVE | Noted: 2022-10-11

## 2022-10-11 LAB
ALBUMIN SERPL BCP-MCNC: 2.3 G/DL (ref 3.5–5.2)
ALP SERPL-CCNC: 107 U/L (ref 55–135)
ALT SERPL W/O P-5'-P-CCNC: 20 U/L (ref 10–44)
ANION GAP SERPL CALC-SCNC: 10 MMOL/L (ref 8–16)
APPEARANCE FLD: NORMAL
AST SERPL-CCNC: 21 U/L (ref 10–40)
BASOPHILS # BLD AUTO: 0.04 K/UL (ref 0–0.2)
BASOPHILS NFR BLD: 0.6 % (ref 0–1.9)
BILIRUB SERPL-MCNC: 0.2 MG/DL (ref 0.1–1)
BODY FLD TYPE: NORMAL
BODY FLD TYPE: NORMAL
BUN SERPL-MCNC: 20 MG/DL (ref 6–20)
CALCIUM SERPL-MCNC: 9.3 MG/DL (ref 8.7–10.5)
CHLORIDE SERPL-SCNC: 99 MMOL/L (ref 95–110)
CO2 SERPL-SCNC: 22 MMOL/L (ref 23–29)
COLOR FLD: YELLOW
CREAT SERPL-MCNC: 1.4 MG/DL (ref 0.5–1.4)
CRYSTALS FLD MICRO: NEGATIVE
DIFFERENTIAL METHOD: ABNORMAL
EOSINOPHIL # BLD AUTO: 0 K/UL (ref 0–0.5)
EOSINOPHIL NFR BLD: 0.4 % (ref 0–8)
ERYTHROCYTE [DISTWIDTH] IN BLOOD BY AUTOMATED COUNT: 13.2 % (ref 11.5–14.5)
EST. GFR  (NO RACE VARIABLE): >60 ML/MIN/1.73 M^2
ESTIMATED AVG GLUCOSE: 269 MG/DL (ref 68–131)
GLUCOSE SERPL-MCNC: 282 MG/DL (ref 70–110)
GLUCOSE SERPL-MCNC: 294 MG/DL (ref 70–110)
GRAM STN SPEC: NORMAL
GRAM STN SPEC: NORMAL
HBA1C MFR BLD: 11 % (ref 4–5.6)
HCT VFR BLD AUTO: 33.8 % (ref 40–54)
HGB BLD-MCNC: 10.2 G/DL (ref 14–18)
IMM GRANULOCYTES # BLD AUTO: 0.04 K/UL (ref 0–0.04)
IMM GRANULOCYTES NFR BLD AUTO: 0.6 % (ref 0–0.5)
INR PPP: 1.1 (ref 0.8–1.2)
LYMPHOCYTES # BLD AUTO: 0.7 K/UL (ref 1–4.8)
LYMPHOCYTES NFR BLD: 9.4 % (ref 18–48)
LYMPHOCYTES NFR FLD MANUAL: 3 %
MAGNESIUM SERPL-MCNC: 1.6 MG/DL (ref 1.6–2.6)
MCH RBC QN AUTO: 26.4 PG (ref 27–31)
MCHC RBC AUTO-ENTMCNC: 30.2 G/DL (ref 32–36)
MCV RBC AUTO: 88 FL (ref 82–98)
MONOCYTES # BLD AUTO: 0.5 K/UL (ref 0.3–1)
MONOCYTES NFR BLD: 6.4 % (ref 4–15)
MONOS+MACROS NFR FLD MANUAL: 3 %
NEUTROPHILS # BLD AUTO: 5.9 K/UL (ref 1.8–7.7)
NEUTROPHILS NFR BLD: 82.6 % (ref 38–73)
NEUTROPHILS NFR FLD MANUAL: 94 %
NRBC BLD-RTO: 0 /100 WBC
PATH INTERP FLD-IMP: NORMAL
PHOSPHATE SERPL-MCNC: 3 MG/DL (ref 2.7–4.5)
PLATELET # BLD AUTO: 366 K/UL (ref 150–450)
PMV BLD AUTO: 10.3 FL (ref 9.2–12.9)
POCT GLUCOSE: 270 MG/DL (ref 70–110)
POCT GLUCOSE: 293 MG/DL (ref 70–110)
POCT GLUCOSE: 294 MG/DL (ref 70–110)
POCT GLUCOSE: 421 MG/DL (ref 70–110)
POTASSIUM SERPL-SCNC: 4.7 MMOL/L (ref 3.5–5.1)
PREALB SERPL-MCNC: 18 MG/DL (ref 20–43)
PROCALCITONIN SERPL IA-MCNC: 0.06 NG/ML
PROT SERPL-MCNC: 8.3 G/DL (ref 6–8.4)
PROTHROMBIN TIME: 11 SEC (ref 9–12.5)
RBC # BLD AUTO: 3.86 M/UL (ref 4.6–6.2)
SARS-COV-2 RDRP RESP QL NAA+PROBE: NEGATIVE
SODIUM SERPL-SCNC: 131 MMOL/L (ref 136–145)
TRANSFERRIN SERPL-MCNC: 144 MG/DL (ref 200–375)
WBC # BLD AUTO: 7.15 K/UL (ref 3.9–12.7)
WBC # FLD: NORMAL /CU MM

## 2022-10-11 PROCEDURE — 27201423 OPTIME MED/SURG SUP & DEVICES STERILE SUPPLY: Performed by: ORTHOPAEDIC SURGERY

## 2022-10-11 PROCEDURE — 99223 1ST HOSP IP/OBS HIGH 75: CPT | Mod: 57,GC,, | Performed by: ORTHOPAEDIC SURGERY

## 2022-10-11 PROCEDURE — 89051 BODY FLUID CELL COUNT: CPT | Performed by: STUDENT IN AN ORGANIZED HEALTH CARE EDUCATION/TRAINING PROGRAM

## 2022-10-11 PROCEDURE — 99223 PR INITIAL HOSPITAL CARE,LEVL III: ICD-10-PCS | Mod: 57,GC,, | Performed by: ORTHOPAEDIC SURGERY

## 2022-10-11 PROCEDURE — 36000711: Performed by: ORTHOPAEDIC SURGERY

## 2022-10-11 PROCEDURE — 87040 BLOOD CULTURE FOR BACTERIA: CPT | Performed by: INTERNAL MEDICINE

## 2022-10-11 PROCEDURE — 25500020 PHARM REV CODE 255: Performed by: INTERNAL MEDICINE

## 2022-10-11 PROCEDURE — 25000003 PHARM REV CODE 250: Performed by: ANESTHESIOLOGY

## 2022-10-11 PROCEDURE — 36000710: Performed by: ORTHOPAEDIC SURGERY

## 2022-10-11 PROCEDURE — 63600175 PHARM REV CODE 636 W HCPCS: Performed by: INTERNAL MEDICINE

## 2022-10-11 PROCEDURE — U0002 COVID-19 LAB TEST NON-CDC: HCPCS | Performed by: STUDENT IN AN ORGANIZED HEALTH CARE EDUCATION/TRAINING PROGRAM

## 2022-10-11 PROCEDURE — 29999 UNLISTED PX ARTHROSCOPY: CPT | Mod: ,,, | Performed by: ORTHOPAEDIC SURGERY

## 2022-10-11 PROCEDURE — 29823 SHO ARTHRS SRG XTNSV DBRDMT: CPT | Mod: LT,,, | Performed by: ORTHOPAEDIC SURGERY

## 2022-10-11 PROCEDURE — 63600175 PHARM REV CODE 636 W HCPCS

## 2022-10-11 PROCEDURE — 64415 INTERSCALENE SINGLE SHOT NERVE BLOCK: ICD-10-PCS | Mod: 59,LT,, | Performed by: ANESTHESIOLOGY

## 2022-10-11 PROCEDURE — 85610 PROTHROMBIN TIME: CPT | Performed by: STUDENT IN AN ORGANIZED HEALTH CARE EDUCATION/TRAINING PROGRAM

## 2022-10-11 PROCEDURE — 63600175 PHARM REV CODE 636 W HCPCS: Performed by: NURSE ANESTHETIST, CERTIFIED REGISTERED

## 2022-10-11 PROCEDURE — 99223 PR INITIAL HOSPITAL CARE,LEVL III: ICD-10-PCS | Mod: AI,,, | Performed by: INTERNAL MEDICINE

## 2022-10-11 PROCEDURE — 84134 ASSAY OF PREALBUMIN: CPT | Performed by: STUDENT IN AN ORGANIZED HEALTH CARE EDUCATION/TRAINING PROGRAM

## 2022-10-11 PROCEDURE — 71000033 HC RECOVERY, INTIAL HOUR: Performed by: ORTHOPAEDIC SURGERY

## 2022-10-11 PROCEDURE — D9220A PRA ANESTHESIA: ICD-10-PCS | Mod: CRNA,,, | Performed by: NURSE ANESTHETIST, CERTIFIED REGISTERED

## 2022-10-11 PROCEDURE — 25000003 PHARM REV CODE 250

## 2022-10-11 PROCEDURE — 25000003 PHARM REV CODE 250: Performed by: STUDENT IN AN ORGANIZED HEALTH CARE EDUCATION/TRAINING PROGRAM

## 2022-10-11 PROCEDURE — 11000001 HC ACUTE MED/SURG PRIVATE ROOM

## 2022-10-11 PROCEDURE — 84100 ASSAY OF PHOSPHORUS: CPT | Performed by: STUDENT IN AN ORGANIZED HEALTH CARE EDUCATION/TRAINING PROGRAM

## 2022-10-11 PROCEDURE — 87206 SMEAR FLUORESCENT/ACID STAI: CPT | Performed by: STUDENT IN AN ORGANIZED HEALTH CARE EDUCATION/TRAINING PROGRAM

## 2022-10-11 PROCEDURE — 87205 SMEAR GRAM STAIN: CPT | Performed by: STUDENT IN AN ORGANIZED HEALTH CARE EDUCATION/TRAINING PROGRAM

## 2022-10-11 PROCEDURE — 87070 CULTURE OTHR SPECIMN AEROBIC: CPT | Performed by: STUDENT IN AN ORGANIZED HEALTH CARE EDUCATION/TRAINING PROGRAM

## 2022-10-11 PROCEDURE — 87116 MYCOBACTERIA CULTURE: CPT | Performed by: STUDENT IN AN ORGANIZED HEALTH CARE EDUCATION/TRAINING PROGRAM

## 2022-10-11 PROCEDURE — D9220A PRA ANESTHESIA: Mod: ANES,,, | Performed by: ANESTHESIOLOGY

## 2022-10-11 PROCEDURE — 63600175 PHARM REV CODE 636 W HCPCS: Performed by: STUDENT IN AN ORGANIZED HEALTH CARE EDUCATION/TRAINING PROGRAM

## 2022-10-11 PROCEDURE — 76942 ECHO GUIDE FOR BIOPSY: CPT | Mod: 26,,, | Performed by: ANESTHESIOLOGY

## 2022-10-11 PROCEDURE — 25000003 PHARM REV CODE 250: Performed by: NURSE ANESTHETIST, CERTIFIED REGISTERED

## 2022-10-11 PROCEDURE — 84145 PROCALCITONIN (PCT): CPT | Performed by: STUDENT IN AN ORGANIZED HEALTH CARE EDUCATION/TRAINING PROGRAM

## 2022-10-11 PROCEDURE — 87102 FUNGUS ISOLATION CULTURE: CPT | Performed by: STUDENT IN AN ORGANIZED HEALTH CARE EDUCATION/TRAINING PROGRAM

## 2022-10-11 PROCEDURE — 71000015 HC POSTOP RECOV 1ST HR: Performed by: ORTHOPAEDIC SURGERY

## 2022-10-11 PROCEDURE — 29823 PR SHLDR ARTHROSCOP,EXTEN DEBRIDE: ICD-10-PCS | Mod: LT,,, | Performed by: ORTHOPAEDIC SURGERY

## 2022-10-11 PROCEDURE — 84466 ASSAY OF TRANSFERRIN: CPT | Performed by: STUDENT IN AN ORGANIZED HEALTH CARE EDUCATION/TRAINING PROGRAM

## 2022-10-11 PROCEDURE — 82962 GLUCOSE BLOOD TEST: CPT | Performed by: ORTHOPAEDIC SURGERY

## 2022-10-11 PROCEDURE — 76942 INTERSCALENE SINGLE SHOT NERVE BLOCK: ICD-10-PCS | Mod: 26,,, | Performed by: ANESTHESIOLOGY

## 2022-10-11 PROCEDURE — 25000003 PHARM REV CODE 250: Performed by: INTERNAL MEDICINE

## 2022-10-11 PROCEDURE — 71000016 HC POSTOP RECOV ADDL HR: Performed by: ORTHOPAEDIC SURGERY

## 2022-10-11 PROCEDURE — 85025 COMPLETE CBC W/AUTO DIFF WBC: CPT | Performed by: STUDENT IN AN ORGANIZED HEALTH CARE EDUCATION/TRAINING PROGRAM

## 2022-10-11 PROCEDURE — 63600175 PHARM REV CODE 636 W HCPCS: Performed by: ORTHOPAEDIC SURGERY

## 2022-10-11 PROCEDURE — 99223 1ST HOSP IP/OBS HIGH 75: CPT | Mod: AI,,, | Performed by: INTERNAL MEDICINE

## 2022-10-11 PROCEDURE — 83735 ASSAY OF MAGNESIUM: CPT | Performed by: STUDENT IN AN ORGANIZED HEALTH CARE EDUCATION/TRAINING PROGRAM

## 2022-10-11 PROCEDURE — 64415 NJX AA&/STRD BRCH PLXS IMG: CPT | Mod: 59,LT,, | Performed by: ANESTHESIOLOGY

## 2022-10-11 PROCEDURE — 76942 ECHO GUIDE FOR BIOPSY: CPT | Performed by: STUDENT IN AN ORGANIZED HEALTH CARE EDUCATION/TRAINING PROGRAM

## 2022-10-11 PROCEDURE — 89060 EXAM SYNOVIAL FLUID CRYSTALS: CPT | Performed by: STUDENT IN AN ORGANIZED HEALTH CARE EDUCATION/TRAINING PROGRAM

## 2022-10-11 PROCEDURE — 37000008 HC ANESTHESIA 1ST 15 MINUTES: Performed by: ORTHOPAEDIC SURGERY

## 2022-10-11 PROCEDURE — D9220A PRA ANESTHESIA: Mod: CRNA,,, | Performed by: NURSE ANESTHETIST, CERTIFIED REGISTERED

## 2022-10-11 PROCEDURE — D9220A PRA ANESTHESIA: ICD-10-PCS | Mod: ANES,,, | Performed by: ANESTHESIOLOGY

## 2022-10-11 PROCEDURE — 83036 HEMOGLOBIN GLYCOSYLATED A1C: CPT | Performed by: STUDENT IN AN ORGANIZED HEALTH CARE EDUCATION/TRAINING PROGRAM

## 2022-10-11 PROCEDURE — 80053 COMPREHEN METABOLIC PANEL: CPT | Performed by: STUDENT IN AN ORGANIZED HEALTH CARE EDUCATION/TRAINING PROGRAM

## 2022-10-11 PROCEDURE — 94761 N-INVAS EAR/PLS OXIMETRY MLT: CPT

## 2022-10-11 PROCEDURE — A9585 GADOBUTROL INJECTION: HCPCS | Performed by: INTERNAL MEDICINE

## 2022-10-11 PROCEDURE — 87075 CULTR BACTERIA EXCEPT BLOOD: CPT | Performed by: STUDENT IN AN ORGANIZED HEALTH CARE EDUCATION/TRAINING PROGRAM

## 2022-10-11 PROCEDURE — 29999: ICD-10-PCS | Mod: ,,, | Performed by: ORTHOPAEDIC SURGERY

## 2022-10-11 PROCEDURE — 37000009 HC ANESTHESIA EA ADD 15 MINS: Performed by: ORTHOPAEDIC SURGERY

## 2022-10-11 RX ORDER — CEFEPIME HYDROCHLORIDE 2 G/1
INJECTION, POWDER, FOR SOLUTION INTRAVENOUS
Status: DISCONTINUED | OUTPATIENT
Start: 2022-10-11 | End: 2022-10-11

## 2022-10-11 RX ORDER — HYDROMORPHONE HYDROCHLORIDE 1 MG/ML
1 INJECTION, SOLUTION INTRAMUSCULAR; INTRAVENOUS; SUBCUTANEOUS EVERY 4 HOURS PRN
Status: DISCONTINUED | OUTPATIENT
Start: 2022-10-11 | End: 2022-10-14 | Stop reason: HOSPADM

## 2022-10-11 RX ORDER — ONDANSETRON 4 MG/1
4 TABLET, FILM COATED ORAL EVERY 8 HOURS PRN
Status: DISCONTINUED | OUTPATIENT
Start: 2022-10-11 | End: 2022-10-14 | Stop reason: HOSPADM

## 2022-10-11 RX ORDER — GABAPENTIN 300 MG/1
300 CAPSULE ORAL 3 TIMES DAILY
Status: DISCONTINUED | OUTPATIENT
Start: 2022-10-11 | End: 2022-10-14 | Stop reason: HOSPADM

## 2022-10-11 RX ORDER — METHOCARBAMOL 500 MG/1
500 TABLET, FILM COATED ORAL 4 TIMES DAILY
Status: DISCONTINUED | OUTPATIENT
Start: 2022-10-11 | End: 2022-10-14 | Stop reason: HOSPADM

## 2022-10-11 RX ORDER — HALOPERIDOL 5 MG/ML
0.5 INJECTION INTRAMUSCULAR EVERY 10 MIN PRN
Status: DISCONTINUED | OUTPATIENT
Start: 2022-10-11 | End: 2022-10-11 | Stop reason: HOSPADM

## 2022-10-11 RX ORDER — ATORVASTATIN CALCIUM 20 MG/1
80 TABLET, FILM COATED ORAL DAILY
Status: DISCONTINUED | OUTPATIENT
Start: 2022-10-11 | End: 2022-10-14

## 2022-10-11 RX ORDER — ONDANSETRON 2 MG/ML
4 INJECTION INTRAMUSCULAR; INTRAVENOUS DAILY PRN
Status: DISCONTINUED | OUTPATIENT
Start: 2022-10-11 | End: 2022-10-11 | Stop reason: HOSPADM

## 2022-10-11 RX ORDER — INSULIN ASPART 100 [IU]/ML
1-10 INJECTION, SOLUTION INTRAVENOUS; SUBCUTANEOUS
Status: DISCONTINUED | OUTPATIENT
Start: 2022-10-11 | End: 2022-10-14 | Stop reason: HOSPADM

## 2022-10-11 RX ORDER — ONDANSETRON 2 MG/ML
INJECTION INTRAMUSCULAR; INTRAVENOUS
Status: DISCONTINUED | OUTPATIENT
Start: 2022-10-11 | End: 2022-10-11

## 2022-10-11 RX ORDER — VANCOMYCIN HYDROCHLORIDE 1 G/20ML
INJECTION, POWDER, LYOPHILIZED, FOR SOLUTION INTRAVENOUS
Status: DISCONTINUED | OUTPATIENT
Start: 2022-10-11 | End: 2022-10-11 | Stop reason: HOSPADM

## 2022-10-11 RX ORDER — ACETAMINOPHEN 325 MG/1
650 TABLET ORAL EVERY 4 HOURS PRN
Status: DISCONTINUED | OUTPATIENT
Start: 2022-10-11 | End: 2022-10-11

## 2022-10-11 RX ORDER — IBUPROFEN 200 MG
16 TABLET ORAL
Status: DISCONTINUED | OUTPATIENT
Start: 2022-10-11 | End: 2022-10-14 | Stop reason: HOSPADM

## 2022-10-11 RX ORDER — PHENYLEPHRINE HYDROCHLORIDE 10 MG/ML
INJECTION INTRAVENOUS
Status: DISCONTINUED | OUTPATIENT
Start: 2022-10-11 | End: 2022-10-11

## 2022-10-11 RX ORDER — LISINOPRIL 20 MG/1
20 TABLET ORAL DAILY
COMMUNITY
Start: 2022-06-27

## 2022-10-11 RX ORDER — FUROSEMIDE 20 MG/1
20 TABLET ORAL DAILY
COMMUNITY
Start: 2022-09-28

## 2022-10-11 RX ORDER — MORPHINE SULFATE 2 MG/ML
2 INJECTION, SOLUTION INTRAMUSCULAR; INTRAVENOUS EVERY 4 HOURS PRN
Status: DISCONTINUED | OUTPATIENT
Start: 2022-10-11 | End: 2022-10-14 | Stop reason: HOSPADM

## 2022-10-11 RX ORDER — LIDOCAINE HYDROCHLORIDE 20 MG/ML
INJECTION INTRAVENOUS
Status: DISCONTINUED | OUTPATIENT
Start: 2022-10-11 | End: 2022-10-11

## 2022-10-11 RX ORDER — LISINOPRIL 20 MG/1
20 TABLET ORAL DAILY
Status: DISCONTINUED | OUTPATIENT
Start: 2022-10-11 | End: 2022-10-14 | Stop reason: HOSPADM

## 2022-10-11 RX ORDER — IBUPROFEN 200 MG
24 TABLET ORAL
Status: DISCONTINUED | OUTPATIENT
Start: 2022-10-11 | End: 2022-10-14 | Stop reason: HOSPADM

## 2022-10-11 RX ORDER — MIDAZOLAM HYDROCHLORIDE 1 MG/ML
.5-4 INJECTION INTRAMUSCULAR; INTRAVENOUS
Status: DISCONTINUED | OUTPATIENT
Start: 2022-10-11 | End: 2022-10-11 | Stop reason: HOSPADM

## 2022-10-11 RX ORDER — LORAZEPAM 1 MG/1
1 TABLET ORAL ONCE
Status: COMPLETED | OUTPATIENT
Start: 2022-10-11 | End: 2022-10-11

## 2022-10-11 RX ORDER — SODIUM CHLORIDE 0.9 % (FLUSH) 0.9 %
10 SYRINGE (ML) INJECTION
Status: DISCONTINUED | OUTPATIENT
Start: 2022-10-11 | End: 2022-10-14 | Stop reason: HOSPADM

## 2022-10-11 RX ORDER — GADOBUTROL 604.72 MG/ML
8 INJECTION INTRAVENOUS
Status: COMPLETED | OUTPATIENT
Start: 2022-10-11 | End: 2022-10-11

## 2022-10-11 RX ORDER — MAG HYDROX/ALUMINUM HYD/SIMETH 200-200-20
30 SUSPENSION, ORAL (FINAL DOSE FORM) ORAL 4 TIMES DAILY PRN
Status: DISCONTINUED | OUTPATIENT
Start: 2022-10-11 | End: 2022-10-14 | Stop reason: HOSPADM

## 2022-10-11 RX ORDER — INSULIN ASPART 100 [IU]/ML
8 INJECTION, SOLUTION INTRAVENOUS; SUBCUTANEOUS ONCE
Status: COMPLETED | OUTPATIENT
Start: 2022-10-11 | End: 2022-10-11

## 2022-10-11 RX ORDER — FUROSEMIDE 20 MG/1
20 TABLET ORAL DAILY
Status: DISCONTINUED | OUTPATIENT
Start: 2022-10-11 | End: 2022-10-14 | Stop reason: HOSPADM

## 2022-10-11 RX ORDER — ACETAMINOPHEN 10 MG/ML
INJECTION, SOLUTION INTRAVENOUS
Status: DISCONTINUED | OUTPATIENT
Start: 2022-10-11 | End: 2022-10-11

## 2022-10-11 RX ORDER — FENTANYL CITRATE 50 UG/ML
INJECTION, SOLUTION INTRAMUSCULAR; INTRAVENOUS
Status: DISCONTINUED | OUTPATIENT
Start: 2022-10-11 | End: 2022-10-11

## 2022-10-11 RX ORDER — CARBAMAZEPINE 200 MG/1
400 TABLET ORAL 2 TIMES DAILY
Status: DISCONTINUED | OUTPATIENT
Start: 2022-10-11 | End: 2022-10-14 | Stop reason: HOSPADM

## 2022-10-11 RX ORDER — SODIUM CHLORIDE 0.9 % (FLUSH) 0.9 %
10 SYRINGE (ML) INJECTION
Status: DISCONTINUED | OUTPATIENT
Start: 2022-10-11 | End: 2022-10-11 | Stop reason: HOSPADM

## 2022-10-11 RX ORDER — FENTANYL CITRATE 50 UG/ML
25 INJECTION, SOLUTION INTRAMUSCULAR; INTRAVENOUS EVERY 5 MIN PRN
Status: DISCONTINUED | OUTPATIENT
Start: 2022-10-11 | End: 2022-10-11 | Stop reason: HOSPADM

## 2022-10-11 RX ORDER — VANCOMYCIN HCL IN 5 % DEXTROSE 1G/250ML
15 PLASTIC BAG, INJECTION (ML) INTRAVENOUS
Status: DISCONTINUED | OUTPATIENT
Start: 2022-10-12 | End: 2022-10-13

## 2022-10-11 RX ORDER — CEFEPIME HYDROCHLORIDE 1 G/50ML
2 INJECTION, SOLUTION INTRAVENOUS
Status: DISCONTINUED | OUTPATIENT
Start: 2022-10-11 | End: 2022-10-13

## 2022-10-11 RX ORDER — ACETAMINOPHEN 500 MG
1000 TABLET ORAL EVERY 8 HOURS
Status: DISCONTINUED | OUTPATIENT
Start: 2022-10-11 | End: 2022-10-14

## 2022-10-11 RX ORDER — FENTANYL CITRATE 50 UG/ML
25-200 INJECTION, SOLUTION INTRAMUSCULAR; INTRAVENOUS
Status: DISCONTINUED | OUTPATIENT
Start: 2022-10-11 | End: 2022-10-11 | Stop reason: HOSPADM

## 2022-10-11 RX ORDER — HALOPERIDOL 5 MG/ML
INJECTION INTRAMUSCULAR
Status: DISCONTINUED | OUTPATIENT
Start: 2022-10-11 | End: 2022-10-11

## 2022-10-11 RX ORDER — PROPOFOL 10 MG/ML
VIAL (ML) INTRAVENOUS
Status: DISCONTINUED | OUTPATIENT
Start: 2022-10-11 | End: 2022-10-11

## 2022-10-11 RX ORDER — VANCOMYCIN HYDROCHLORIDE 1 G/20ML
INJECTION, POWDER, LYOPHILIZED, FOR SOLUTION INTRAVENOUS
Status: DISCONTINUED | OUTPATIENT
Start: 2022-10-11 | End: 2022-10-11

## 2022-10-11 RX ORDER — NALOXONE HCL 0.4 MG/ML
0.02 VIAL (ML) INJECTION
Status: DISCONTINUED | OUTPATIENT
Start: 2022-10-11 | End: 2022-10-14 | Stop reason: HOSPADM

## 2022-10-11 RX ORDER — INSULIN ASPART 100 [IU]/ML
3 INJECTION, SOLUTION INTRAVENOUS; SUBCUTANEOUS ONCE
Status: COMPLETED | OUTPATIENT
Start: 2022-10-11 | End: 2022-10-11

## 2022-10-11 RX ORDER — ROCURONIUM BROMIDE 10 MG/ML
INJECTION, SOLUTION INTRAVENOUS
Status: DISCONTINUED | OUTPATIENT
Start: 2022-10-11 | End: 2022-10-11

## 2022-10-11 RX ORDER — MIDAZOLAM HYDROCHLORIDE 1 MG/ML
INJECTION, SOLUTION INTRAMUSCULAR; INTRAVENOUS
Status: DISCONTINUED | OUTPATIENT
Start: 2022-10-11 | End: 2022-10-11

## 2022-10-11 RX ORDER — FENTANYL CITRATE 50 UG/ML
INJECTION, SOLUTION INTRAMUSCULAR; INTRAVENOUS
Status: COMPLETED
Start: 2022-10-11 | End: 2022-10-11

## 2022-10-11 RX ORDER — SODIUM CHLORIDE 0.9 % (FLUSH) 0.9 %
10 SYRINGE (ML) INJECTION EVERY 12 HOURS PRN
Status: DISCONTINUED | OUTPATIENT
Start: 2022-10-11 | End: 2022-10-14 | Stop reason: HOSPADM

## 2022-10-11 RX ORDER — BUPIVACAINE HYDROCHLORIDE 5 MG/ML
INJECTION, SOLUTION EPIDURAL; INTRACAUDAL
Status: COMPLETED | OUTPATIENT
Start: 2022-10-11 | End: 2022-10-11

## 2022-10-11 RX ORDER — GLUCAGON 1 MG
1 KIT INJECTION
Status: DISCONTINUED | OUTPATIENT
Start: 2022-10-11 | End: 2022-10-14 | Stop reason: HOSPADM

## 2022-10-11 RX ORDER — OXYCODONE HYDROCHLORIDE 5 MG/1
5 TABLET ORAL
Status: DISCONTINUED | OUTPATIENT
Start: 2022-10-11 | End: 2022-10-11 | Stop reason: HOSPADM

## 2022-10-11 RX ORDER — MUPIROCIN 20 MG/G
OINTMENT TOPICAL
Status: DISCONTINUED | OUTPATIENT
Start: 2022-10-11 | End: 2022-10-11 | Stop reason: HOSPADM

## 2022-10-11 RX ORDER — VENLAFAXINE HYDROCHLORIDE 75 MG/1
75 CAPSULE, EXTENDED RELEASE ORAL DAILY
Status: DISCONTINUED | OUTPATIENT
Start: 2022-10-11 | End: 2022-10-14 | Stop reason: HOSPADM

## 2022-10-11 RX ORDER — PROCHLORPERAZINE EDISYLATE 5 MG/ML
5 INJECTION INTRAMUSCULAR; INTRAVENOUS EVERY 6 HOURS PRN
Status: DISCONTINUED | OUTPATIENT
Start: 2022-10-11 | End: 2022-10-14 | Stop reason: HOSPADM

## 2022-10-11 RX ADMIN — GABAPENTIN 300 MG: 300 CAPSULE ORAL at 09:10

## 2022-10-11 RX ADMIN — PHENYLEPHRINE HYDROCHLORIDE 80 MCG: 10 INJECTION INTRAVENOUS at 02:10

## 2022-10-11 RX ADMIN — ROCURONIUM BROMIDE 60 MG: 10 INJECTION INTRAVENOUS at 12:10

## 2022-10-11 RX ADMIN — CARBAMAZEPINE 400 MG: 200 TABLET ORAL at 09:10

## 2022-10-11 RX ADMIN — OXYCODONE 5 MG: 5 TABLET ORAL at 04:10

## 2022-10-11 RX ADMIN — METHOCARBAMOL 500 MG: 500 TABLET ORAL at 09:10

## 2022-10-11 RX ADMIN — CEFEPIME 2 G: 2 INJECTION, POWDER, FOR SOLUTION INTRAVENOUS at 01:10

## 2022-10-11 RX ADMIN — VANCOMYCIN HYDROCHLORIDE 1 G: 1 INJECTION, POWDER, LYOPHILIZED, FOR SOLUTION INTRAVENOUS at 01:10

## 2022-10-11 RX ADMIN — CEFEPIME 2 G: 2 INJECTION, POWDER, FOR SOLUTION INTRAVENOUS at 06:10

## 2022-10-11 RX ADMIN — INSULIN ASPART 8 UNITS: 100 INJECTION, SOLUTION INTRAVENOUS; SUBCUTANEOUS at 09:10

## 2022-10-11 RX ADMIN — LORAZEPAM 1 MG: 1 TABLET ORAL at 07:10

## 2022-10-11 RX ADMIN — FUROSEMIDE 20 MG: 20 TABLET ORAL at 09:10

## 2022-10-11 RX ADMIN — GADOBUTROL 8 ML: 604.72 INJECTION INTRAVENOUS at 07:10

## 2022-10-11 RX ADMIN — PROPOFOL 150 MG: 10 INJECTION, EMULSION INTRAVENOUS at 12:10

## 2022-10-11 RX ADMIN — HALOPERIDOL LACTATE 0.5 MG: 5 INJECTION, SOLUTION INTRAMUSCULAR at 02:10

## 2022-10-11 RX ADMIN — MUPIROCIN: 20 OINTMENT TOPICAL at 10:10

## 2022-10-11 RX ADMIN — ATORVASTATIN CALCIUM 80 MG: 40 TABLET, FILM COATED ORAL at 09:10

## 2022-10-11 RX ADMIN — METHOCARBAMOL 500 MG: 500 TABLET ORAL at 05:10

## 2022-10-11 RX ADMIN — INSULIN DETEMIR 9 UNITS: 100 INJECTION, SOLUTION SUBCUTANEOUS at 09:10

## 2022-10-11 RX ADMIN — INSULIN ASPART 5 UNITS: 100 INJECTION, SOLUTION INTRAVENOUS; SUBCUTANEOUS at 09:10

## 2022-10-11 RX ADMIN — LISINOPRIL 20 MG: 20 TABLET ORAL at 09:10

## 2022-10-11 RX ADMIN — INSULIN ASPART 3 UNITS: 100 INJECTION, SOLUTION INTRAVENOUS; SUBCUTANEOUS at 10:10

## 2022-10-11 RX ADMIN — LIDOCAINE HYDROCHLORIDE 100 MG: 20 INJECTION INTRAVENOUS at 12:10

## 2022-10-11 RX ADMIN — SODIUM CHLORIDE: 0.9 INJECTION, SOLUTION INTRAVENOUS at 12:10

## 2022-10-11 RX ADMIN — FENTANYL CITRATE 100 MCG: 50 INJECTION, SOLUTION INTRAMUSCULAR; INTRAVENOUS at 12:10

## 2022-10-11 RX ADMIN — VANCOMYCIN HYDROCHLORIDE 1500 MG: 1.5 INJECTION, POWDER, LYOPHILIZED, FOR SOLUTION INTRAVENOUS at 07:10

## 2022-10-11 RX ADMIN — FENTANYL CITRATE 25 MCG: 50 INJECTION, SOLUTION INTRAMUSCULAR; INTRAVENOUS at 04:10

## 2022-10-11 RX ADMIN — ACETAMINOPHEN 1000 MG: 500 TABLET ORAL at 09:10

## 2022-10-11 RX ADMIN — INSULIN ASPART 6 UNITS: 100 INJECTION, SOLUTION INTRAVENOUS; SUBCUTANEOUS at 03:10

## 2022-10-11 RX ADMIN — SUGAMMADEX 200 MG: 100 INJECTION, SOLUTION INTRAVENOUS at 02:10

## 2022-10-11 RX ADMIN — ONDANSETRON 4 MG: 2 INJECTION INTRAMUSCULAR; INTRAVENOUS at 02:10

## 2022-10-11 RX ADMIN — PHENYLEPHRINE HYDROCHLORIDE 160 MCG: 10 INJECTION INTRAVENOUS at 02:10

## 2022-10-11 RX ADMIN — MIDAZOLAM 1 MG: 1 INJECTION INTRAMUSCULAR; INTRAVENOUS at 11:10

## 2022-10-11 RX ADMIN — MIDAZOLAM HYDROCHLORIDE 2 MG: 1 INJECTION, SOLUTION INTRAMUSCULAR; INTRAVENOUS at 12:10

## 2022-10-11 RX ADMIN — ACETAMINOPHEN 1000 MG: 10 INJECTION INTRAVENOUS at 02:10

## 2022-10-11 RX ADMIN — BUPIVACAINE HYDROCHLORIDE 20 ML: 5 INJECTION, SOLUTION EPIDURAL; INTRACAUDAL; PERINEURAL at 11:10

## 2022-10-11 RX ADMIN — ROCURONIUM BROMIDE 20 MG: 10 INJECTION INTRAVENOUS at 01:10

## 2022-10-11 RX ADMIN — PHENYLEPHRINE HYDROCHLORIDE 0.2 MCG/KG/MIN: 10 INJECTION INTRAVENOUS at 02:10

## 2022-10-11 RX ADMIN — FENTANYL CITRATE 50 MCG: 50 INJECTION INTRAMUSCULAR; INTRAVENOUS at 11:10

## 2022-10-11 RX ADMIN — MORPHINE SULFATE 2 MG: 2 INJECTION, SOLUTION INTRAMUSCULAR; INTRAVENOUS at 10:10

## 2022-10-11 RX ADMIN — VENLAFAXINE HYDROCHLORIDE 75 MG: 75 CAPSULE, EXTENDED RELEASE ORAL at 09:10

## 2022-10-11 NOTE — ASSESSMENT & PLAN NOTE
Patient current Ali on lisinopril 20 mg daily and furosemide, caretaker this not know his home blood pressures.  In the ED max systolic blood pressure of 160 mmhg, during time frame where patient had joint aspiration and mandibular lesion of his shoulder.  -continue home meds

## 2022-10-11 NOTE — HPI
Pavel Field is a 42 y.o. male pmhx including anxiety, depression, IDDM, HLD, HTN, seizures, and developmental delay presented with left shoulder pain for the past 2 weeks that began after a fall.  He denies fevers or a known history of gout. Initial imaging in the ED showed a possible L shoulder subluxation and reduction was attempted by the ED without success. Orthopedics was then consulted for this. Inflammatory markers are elevated and on exam he has a large shoulder effusion.  , , WBC 8.5.

## 2022-10-11 NOTE — PROGRESS NOTES
Physical Therapy Missed Evaluation Note      Patient Name:  Pavel Field   MRN:  [id number]  Admitting Diagnosis:  Septic arthritis of shoulder, left   Recent Surgery: Procedure(s) (LRB):  DEBRIDEMENT, SHOULDER, ARTHROSCOPIC  (Left)  SYNOVECTOMY, SHOULDER (Left) Day of Surgery  Admit Date: 10/10/2022  Length of Stay: 0 days    Patient not seen today due to VALENTIN for surgery. Will follow-up for progressive mobility pending continued medical stability and patient participation.

## 2022-10-11 NOTE — CONSULTS
[facility] Emergency Dept  Orthopedics  Consult Note    Patient Name: Pavel Field  MRN: [id number]  Admission Date: 10/10/2022  Hospital Length of Stay: 0 days  Attending Provider: Marisa Mina MD  Primary Care Provider: Ingrid Madrigal MD    Inpatient consult to Orthopedic Surgery  Consult performed by: Chilango Dickens MD  Consult ordered by: Rios Ashford MD        Subjective:     Principal Problem:Septic arthritis of shoulder, left    Chief Complaint:   Chief Complaint   Patient presents with    Shoulder Pain     X 2 weeks, denies falls or trauma.         HPI: Pavel Field is a 42 y.o. male pmhx including anxiety, depression, IDDM, HLD, HTN, seizures, and developmental delay presented with left shoulder pain for the past 2 weeks that began after a fall.  He denies fevers or a known history of gout. Initial imaging in the ED showed a possible L shoulder subluxation and reduction was attempted by the ED without success. Orthopedics was then consulted for this. Inflammatory markers are elevated and on exam he has a large shoulder effusion.  , , WBC 8.5.       Past Medical History:   Diagnosis Date    Anxiety     Depression     Diabetes mellitus     DKA (diabetic ketoacidoses)     Glaucoma     Hyperlipemia     Hypertension     Seizures        Past Surgical History:   Procedure Laterality Date    EYE SURGERY      cataracts - left 2019       Review of patient's allergies indicates:  No Known Allergies    Current Facility-Administered Medications   Medication    propofol (DIPRIVAN) 10 mg/mL IVP injection     Current Outpatient Medications   Medication Sig    atorvastatin (LIPITOR) 80 MG tablet Take 80 mg by mouth once daily.    BASAGLAR KWIKPEN U-100 INSULIN glargine 100 units/mL (3mL) SubQ pen Inject 18 Units into the skin every evening. (Patient taking differently: Inject 25 Units into the skin every evening.)    carBAMazepine (TEGRETOL) 200 mg tablet Take 400 mg by mouth  "2 (two) times daily.    venlafaxine (EFFEXOR-XR) 75 MG 24 hr capsule Take 1 capsule by mouth once daily.    BD ULTRA-FINE BRUNILDA PEN NEEDLE 32 gauge x 5/32" Ndle Inject 1 Units into the skin 4 (four) times daily.    insulin aspart U-100 (NOVOLOG) 100 unit/mL InPn pen Inject 8 Units into the skin 3 (three) times daily with meals.    ondansetron (ZOFRAN) 4 MG tablet Take 1 tablet (4 mg total) by mouth every 6 (six) hours.    ondansetron (ZOFRAN-ODT) 4 MG TbDL Take 1 tablet (4 mg total) by mouth every 6 (six) hours as needed (nausea and vomiting).    ondansetron (ZOFRAN-ODT) 4 MG TbDL Take 1 tablet (4 mg total) by mouth every 8 (eight) hours as needed (nausea).    promethazine (PHENERGAN) 25 MG tablet Take 1 tablet (25 mg total) by mouth every 6 (six) hours as needed for Nausea.     Family History    None       Tobacco Use    Smoking status: Never    Smokeless tobacco: Never   Substance and Sexual Activity    Alcohol use: No    Drug use: No    Sexual activity: Not on file     ROS  Constitutional: negative for fevers/chills/night sweats  Eyes: no acute visual changes  ENT: negative acute  for hearing loss  Respiratory: negative for dyspnea  Cardiovascular: negative for chest pain  Gastrointestinal: negative for abdominal pain  Genitourinary: negative for dysuria  Neurological: negative for headaches  Behavioral/Psych: negative for hallucinations  Endocrine: negative for temperature intolerance  MSK: per HPI    Objective:     Vital Signs (Most Recent):  Temp: 97.8 °F (36.6 °C) (10/10/22 2344)  Pulse: 79 (10/11/22 0204)  Resp: 16 (10/10/22 2234)  BP: 113/74 (10/11/22 0204)  SpO2: 97 % (10/11/22 0119) Vital Signs (24h Range):  Temp:  [97.6 °F (36.4 °C)-98.1 °F (36.7 °C)] 97.8 °F (36.6 °C)  Pulse:  [56-84] 79  Resp:  [10-38] 16  SpO2:  [97 %-100 %] 97 %  BP: (102-185)/() 113/74     Weight: 70.3 kg (155 lb)     Body mass index is 22.24 kg/m².    No intake or output data in the 24 hours ending 10/11/22 " 0217    Ortho/SPM Exam    General:  no acute distress, appears stated age   Neuro: alert and oriented x3  Psych: normal mood  Head: normocephalic, atraumatic.  Eyes: no scleral icterus  Mouth: moist mucous membranes  Cardiovascular: extremities warm and well perfused  Lungs: breathing comfortably, equal chest rise bilat  Skin: clean, dry, intact (any exceptions noted in below musculoskeletal exam)       Musculoskeletal  Right Upper Extremity     -Skin, Intact : no ecchymosis, lesions, lacerations or abrasions   -Non tender to palpation of entire arm   -Deltoid, biceps, triceps intact   -Compartments soft and compressible  -A/P ROM full in fingers, wrist, elbow, shoulder  -SILT M/R/U/Ax  -Motor intact Ain/PIN/U/Ax  -WWP     Left Upper Extremity     -Skin, Intact : no ecchymosis, lesions, lacerations or abrasions   -Mild TTP around the shoulder   -Deltoid, biceps, triceps intact   -Compartments soft and compressible  -Limited shoulder ROM due to pain  -SILT M/R/U/Ax  -Motor intact Ain/PIN/U/Ax  -WWP     Right Lower Extremity Exam     - Skin Intact : no ecchymosis, lesions, lacerations or abrasions   - Non-tender to palpation of the entire leg  - Quad/ Hip flexor intact   - Compartments soft and compressible  - A/P ROM full to the toes, ankle, knee, and hip  - TA/EHL/Gastroc/FHL intact  - SILT throughout  - WWP        Left Lower Extremity Exam    - Skin Intact : no ecchymosis, lesions, lacerations or abrasions   - Non-tender to palpation of the entire leg  - Quad/ Hip flexor intact   - Compartments soft and compressible  - A/P ROM full to the toes, ankle, knee, and hip  - TA/EHL/Gastroc/FHL intact  - SILT throughout  - WWP      All joints (shoulder/elbow/wrist/hip/knee/ankle) were examined and had full ROM and were non-tender to palpation except as above         Significant Labs: CBC:   Recent Labs   Lab 10/10/22  2143   WBC 8.50   HGB 10.4*   HCT 35.4*        CMP:   Recent Labs   Lab 10/10/22  2143   *    K 4.5   CL 99   CO2 26   *   BUN 19   CREATININE 1.4   CALCIUM 8.9   PROT 8.3   ALBUMIN 2.3*   BILITOT 0.2   ALKPHOS 115   AST 18   ALT 20   ANIONGAP 7*     All pertinent labs within the past 24 hours have been reviewed.    Significant Imaging: I have reviewed all pertinent imaging results/findings. Possible anterior subluxation of the left shoulder with a large joint effusion.     Assessment/Plan:     * Septic arthritis of shoulder, left  Pavel Field is a 42 y.o. male presenting with left shoulder pain for the past 2 weeks. he does not have a history of gout. , , WBC 8.5, afebrile. Denies any recent fevers or chills. No reported recent prior infections. No recent recent antibiotics.     -- Aspiration at bedside   -- Hold Abx until after OR  -- Hold chemical DVT PPX until after OR   -- Plan for OR today for I&D   -- Consult ID for assistance with antibiotics  -- WBAT   -- Elevate affected extremity     Procedure Note: Left shoulder aspiration  Patient was explained risks, benefits, and alternatives to treatment and verbalized consent to proceed. Time out was performed and patient name, , site, and procedure were confirmed. Skin was sterilely prepared with alcohol solution. 18 gauge needle on a 60 cc syringe was used for aspiration through posterior approach. 60 cc of cloudy yellow colored fluid collected. Fluid and cultures were obtained and sent for analysis. Aspiration site was covered with a bandaid.      Lab Results   Component Value Date    WBCBF [id number] 10/11/2022    SEGS 94 10/11/2022    CRYST Negative 10/11/2022    BODYFLUIDTYP Joint Fluid Left Shoulder 10/11/2022       Chilango Dickens MD  Orthopedics  [facility]

## 2022-10-11 NOTE — SUBJECTIVE & OBJECTIVE
"Past Medical History:   Diagnosis Date    Anxiety     Depression     Diabetes mellitus     DKA (diabetic ketoacidoses)     Glaucoma     Hyperlipemia     Hypertension     Seizures        Past Surgical History:   Procedure Laterality Date    EYE SURGERY      cataracts - left 2019       Review of patient's allergies indicates:  No Known Allergies    Current Facility-Administered Medications   Medication    propofol (DIPRIVAN) 10 mg/mL IVP injection     Current Outpatient Medications   Medication Sig    atorvastatin (LIPITOR) 80 MG tablet Take 80 mg by mouth once daily.    BASAGLAR KWIKPEN U-100 INSULIN glargine 100 units/mL (3mL) SubQ pen Inject 18 Units into the skin every evening. (Patient taking differently: Inject 25 Units into the skin every evening.)    carBAMazepine (TEGRETOL) 200 mg tablet Take 400 mg by mouth 2 (two) times daily.    venlafaxine (EFFEXOR-XR) 75 MG 24 hr capsule Take 1 capsule by mouth once daily.    BD ULTRA-FINE BRUNILDA PEN NEEDLE 32 gauge x 5/32" Ndle Inject 1 Units into the skin 4 (four) times daily.    insulin aspart U-100 (NOVOLOG) 100 unit/mL InPn pen Inject 8 Units into the skin 3 (three) times daily with meals.    ondansetron (ZOFRAN) 4 MG tablet Take 1 tablet (4 mg total) by mouth every 6 (six) hours.    ondansetron (ZOFRAN-ODT) 4 MG TbDL Take 1 tablet (4 mg total) by mouth every 6 (six) hours as needed (nausea and vomiting).    ondansetron (ZOFRAN-ODT) 4 MG TbDL Take 1 tablet (4 mg total) by mouth every 8 (eight) hours as needed (nausea).    promethazine (PHENERGAN) 25 MG tablet Take 1 tablet (25 mg total) by mouth every 6 (six) hours as needed for Nausea.     Family History    None       Tobacco Use    Smoking status: Never    Smokeless tobacco: Never   Substance and Sexual Activity    Alcohol use: No    Drug use: No    Sexual activity: Not on file     ROS  Constitutional: negative for fevers/chills/night sweats  Eyes: no acute visual changes  ENT: negative acute  for hearing " loss  Respiratory: negative for dyspnea  Cardiovascular: negative for chest pain  Gastrointestinal: negative for abdominal pain  Genitourinary: negative for dysuria  Neurological: negative for headaches  Behavioral/Psych: negative for hallucinations  Endocrine: negative for temperature intolerance  MSK: per HPI    Objective:     Vital Signs (Most Recent):  Temp: 97.8 °F (36.6 °C) (10/10/22 2344)  Pulse: 79 (10/11/22 0204)  Resp: 16 (10/10/22 2234)  BP: 113/74 (10/11/22 0204)  SpO2: 97 % (10/11/22 0119) Vital Signs (24h Range):  Temp:  [97.6 °F (36.4 °C)-98.1 °F (36.7 °C)] 97.8 °F (36.6 °C)  Pulse:  [56-84] 79  Resp:  [10-38] 16  SpO2:  [97 %-100 %] 97 %  BP: (102-185)/() 113/74     Weight: 70.3 kg (155 lb)     Body mass index is 22.24 kg/m².    No intake or output data in the 24 hours ending 10/11/22 0217    Ortho/SPM Exam    General:  no acute distress, appears stated age   Neuro: alert and oriented x3  Psych: normal mood  Head: normocephalic, atraumatic.  Eyes: no scleral icterus  Mouth: moist mucous membranes  Cardiovascular: extremities warm and well perfused  Lungs: breathing comfortably, equal chest rise bilat  Skin: clean, dry, intact (any exceptions noted in below musculoskeletal exam)       Musculoskeletal  Right Upper Extremity     -Skin, Intact : no ecchymosis, lesions, lacerations or abrasions   -Non tender to palpation of entire arm   -Deltoid, biceps, triceps intact   -Compartments soft and compressible  -A/P ROM full in fingers, wrist, elbow, shoulder  -SILT M/R/U/Ax  -Motor intact Ain/PIN/U/Ax  -WWP     Left Upper Extremity     -Skin, Intact : no ecchymosis, lesions, lacerations or abrasions   -Mild TTP around the shoulder   -Deltoid, biceps, triceps intact   -Compartments soft and compressible  -Limited shoulder ROM due to pain  -SILT M/R/U/Ax  -Motor intact Ain/PIN/U/Ax  -WWP     Right Lower Extremity Exam     - Skin Intact : no ecchymosis, lesions, lacerations or abrasions   - Non-tender to  palpation of the entire leg  - Quad/ Hip flexor intact   - Compartments soft and compressible  - A/P ROM full to the toes, ankle, knee, and hip  - TA/EHL/Gastroc/FHL intact  - SILT throughout  - WWP        Left Lower Extremity Exam    - Skin Intact : no ecchymosis, lesions, lacerations or abrasions   - Non-tender to palpation of the entire leg  - Quad/ Hip flexor intact   - Compartments soft and compressible  - A/P ROM full to the toes, ankle, knee, and hip  - TA/EHL/Gastroc/FHL intact  - SILT throughout  - WWP      All joints (shoulder/elbow/wrist/hip/knee/ankle) were examined and had full ROM and were non-tender to palpation except as above         Significant Labs: CBC:   Recent Labs   Lab 10/10/22  2143   WBC 8.50   HGB 10.4*   HCT 35.4*        CMP:   Recent Labs   Lab 10/10/22  2143   *   K 4.5   CL 99   CO2 26   *   BUN 19   CREATININE 1.4   CALCIUM 8.9   PROT 8.3   ALBUMIN 2.3*   BILITOT 0.2   ALKPHOS 115   AST 18   ALT 20   ANIONGAP 7*     All pertinent labs within the past 24 hours have been reviewed.    Significant Imaging: I have reviewed all pertinent imaging results/findings. Possible anterior subluxation of the left shoulder with a large joint effusion.

## 2022-10-11 NOTE — PROGRESS NOTES
Nurses Note -- 4 Eyes      10/11/2022   5:28 PM      Skin assessed during: Admit      [x] No Pressure Injuries Present    []Prevention Measures Documented      [] Yes- Altered Skin Integrity Present or Discovered   [] LDA Added if Not in Epic (Describe Wound)   [] New Altered Skin Integrity was Present on Admit and Documented in LDA   [] Wound Image Taken    Wound Care Consulted? No    Attending Nurse:  Avril Saenz RN     Second RN/Staff Member:  Kip Castillo RN

## 2022-10-11 NOTE — ASSESSMENT & PLAN NOTE
Patient does not remember when was his last seizure, currently on carbamazepine without adverse effects although it could be contributing to his Na of 132 on admission.  -continue with carbamazepine 200 mg

## 2022-10-11 NOTE — PROGRESS NOTES
Patient received in change over from Dr. Magallon. Patient with history of developmental disability, DMI, HTN, seizures brought in accompanied by his caregiver for left shoulder pain. Onset two weeks ago. He states that he thinks he might have fallen in the shower two weeks ago. His caregiver reports that the patient has been having pain in the shoulder and not wanting to move it for two weeks but was refusing to come to the hospital to be evaluated.     In the ED, his initial shoulder XR with anterior inferior shoulder dislocation versus subluxation. Care was transitioned to me at this time for shoulder reduction. The patient was consented for closed shoulder reduction with procedural sedation using propofol. The patient's caregiver reports that the patient has capacity to consent and make all his own medical decisions. I discussed the risks, benefits and alternatives to the procedure and the patient was consented. He has decision making capacity on my assessment. Reduction was attempted but unsuccessful. Please see resident reduction procedure note and my sedation procedure note below. Orthopedics was consulted. Repeat shoulder XR read now stating infectious etiology cannot be excluded however the patient remote history of trauma making trauma more likely. Inflammatory markers ordered to rule out septic arthritis.    Inflammatory markers are elevated on evaluation of labs. Discussed with ortho. Ortho planning for joint arthrocentesis and repeat imaging. Care transitioned to oncoming staff. Dispo pending fluid studies and ortho recommendations.      Procedural Sedation        Date/Time: 10/11/2022 1:23 AM  Performed by: Angélica Hagen MD  Authorized by: Douglas Magallon MD   Consent Done: Yes  Consent: Written consent obtained.  Consent given by: patient  Patient understanding: patient states understanding of the procedure being performed  Patient consent: the patient's understanding of the procedure matches  "consent given  Procedure consent: procedure consent matches procedure scheduled  Relevant documents: relevant documents present and verified  Imaging studies: imaging studies available  Patient identity confirmed: , MRN, name and verbally with patient  Time out: Immediately prior to procedure a "time out" was called to verify the correct patient, procedure, equipment, support staff and site/side marked as required.  ASA Class: Class 2 - Mild Illness without functional impairment.  Mallampati Score: Class 1 - Visualization of the soft palate, fauces, uvula, and anterior/posterior pillars.   Contents of last solid: Three hours prior to procedure    Equipment: on cardiac monitor., on BP monitor., suction available., on supplemental oxygen., on CO2 monitor. and airway equipment available.     Sedation type: moderate (conscious) sedation    Sedatives: propofol  Total Sedation Time (min): 24  Vitals: Vital signs were monitored during sedation.  Complications: No complications.   Patient/Family history of anesthesia or sedation complications: No     "

## 2022-10-11 NOTE — HPI
41 yo M with extensive pmhx including anxiety, depression, IDDM, HLD, HTN, seizures, developmental delay presents with a chief complaint of left shoulder pain.  History is assisted by the patient's caregiver.  Patient has 24 hour caregivers.  He has been complaining of left shoulder pain for the past 2.5 weeks.  Pain is constant.  Pain is worsened with changes in weather and movement.  No trauma or increased use.  Patient is suspicious that he may have slept on it wrong.  Prior to this time, he has had no previous shoulder issues.  He has tried taking Tylenol Arthritis and Biofreeze without resolution of symptoms.  No fevers.  No history of gout.  No history of IVDU.        Mr. Field is a 42-year-old male with a past medical history hypertension, seizures, developmental delay, hyperlipidemia, type 1 diabetes mellitus on insulin (concurrently using Dexcom 6), anxiety and depression.  He presents to the emergency department with chief complaint of left shoulder pain, that started 2 weeks ago after falling in the bath tub.  Denies trauma to the head, loss of consciousness or lacerations.  Patient states that pain was manageable at the beginning but progressively got worse limiting his range of motion.  Today pain is described as dull, 2/10 at rest and 7/10 with movement.  Pain is partially alleviated by Tylenol and Biofreeze.  Denies localized joint swelling, erythema, fevers, chills, left arm paresthesias or weakness.    In the ED shoulder xray with no acute osseous abnormality of the left shoulder. Previously described possible left shoulder subluxation or dislocation is not seen on these radiographic views.  Orthopedics was consulted.  ESR of 104 CRPP 137 in white blood count of 8.5 shoulder was aspirated with marked increase white blood cell count.  MRI of the shoulder with evidence of synovitis and capsulitis concerning for septic arthritis, osteomyelitis of the humeral head, intramuscular edema involving deltoid  and rotator cuff muscles concerning for myositis and so acromial bursitis and biceps tenosynovitis. No evidence of soft tissue abscess

## 2022-10-11 NOTE — ASSESSMENT & PLAN NOTE
On known specifics about developmental disability, patient lives on his own but has caretaker 24/7, patient is oriented at the time person and place and is able to recall the medication he is currently on.

## 2022-10-11 NOTE — TRANSFER OF CARE
Anesthesia Transfer of Care Note    Patient: Pavel Field    Procedure(s) Performed: Procedure(s) (LRB):  DEBRIDEMENT, SHOULDER, ARTHROSCOPIC  (Left)  SYNOVECTOMY, SHOULDER (Left)    Patient location: PACU    Anesthesia Type: general    Transport from OR: Transported from OR on 6-10 L/min O2 by face mask with adequate spontaneous ventilation    Post pain: adequate analgesia    Post assessment: tolerated procedure well and no apparent anesthetic complications    Post vital signs: stable    Level of consciousness: seizure activity    Nausea/Vomiting: no nausea/vomiting    Complications: none    Transfer of care protocol was followed      Last vitals:   Visit Vitals  BP (!) 114/59 (BP Location: Right arm, Patient Position: Lying)   Pulse 96   Temp 36.5 °C (97.7 °F) (Temporal)   Resp 16   Wt 70.3 kg (155 lb)   SpO2 97%   BMI 22.24 kg/m²

## 2022-10-11 NOTE — PROGRESS NOTES
Pharmacokinetic Initial Assessment: IV Vancomycin    Assessment/Plan:    -Vancomycin initiated for bone/joint.  -OR admixture of vancomycin and cefepime given into shoulder joint.   -Goal 15-20 mcg/mL.  -Scr 1.4 mg/dL at admit - CTM. Appears around baseline.    -Vancomycin 1500 mg IV x 1 (20 mg/kg), followed by vancomycin 1000 mg IV Q12H (est. AUC:ANETA of 514mcg*hr/mL).  -Trough on 10/13 @ 0300.      Pharmacy will continue to follow and monitor vancomycin.      Please contact pharmacy at extension [phone] with any questions regarding this assessment.     Thank you for the consult,   Noah Guerrero       Patient brief summary:  Pavel Field is a 42 y.o. male initiated on antimicrobial therapy with IV Vancomycin for treatment of suspected bone/joint infection    Drug Allergies:   Review of patient's allergies indicates:   Allergen Reactions    Vitamin c, e, zinc, copper combination no.11 Hives       Actual Body Weight:   70.3 kg    Renal Function:   Estimated Creatinine Clearance: 69 mL/min (based on SCr of 1.4 mg/dL).,     Dialysis Method (if applicable):  N/A    CBC (last 72 hours):  Recent Labs   Lab Result Units 10/10/22  2143 10/11/22  0443   WBC K/uL 8.50 7.15   Hemoglobin g/dL 10.4* 10.2*   Hemoglobin A1C %  --  11.0*   Hematocrit % 35.4* 33.8*   Platelets K/uL 420 366   Gran % % 71.3 82.6*   Lymph % % 17.9* 9.4*   Mono % % 9.2 6.4   Eosinophil % % 0.8 0.4   Basophil % % 0.4 0.6   Differential Method  Automated Automated       Metabolic Panel (last 72 hours):  Recent Labs   Lab Result Units 10/10/22  2143 10/11/22  0443   Sodium mmol/L 132* 131*   Potassium mmol/L 4.5 4.7   Chloride mmol/L 99 99   CO2 mmol/L 26 22*   Glucose mg/dL 267* 282*   BUN mg/dL 19 20   Creatinine mg/dL 1.4 1.4   Albumin g/dL 2.3* 2.3*   Total Bilirubin mg/dL 0.2 0.2   Alkaline Phosphatase U/L 115 107   AST U/L 18 21   ALT U/L 20 20   Magnesium mg/dL  --  1.6   Phosphorus mg/dL  --  3.0       Drug levels (last 3 results):  No results for  input(s): VANCOMYCINRA, VANCORANDOM, VANCOMYCINPE, VANCOPEAK, VANCOMYCINTR, VANCOTROUGH in the last 72 hours.    Microbiologic Results:  Microbiology Results (last 7 days)       Procedure Component Value Units Date/Time    Fungus culture [[id number]] Collected: 10/11/22 0005    Order Status: Completed Specimen: Joint Fluid from Shoulder, Left Updated: 10/11/22 1218     Fungus (Mycology) Culture Culture in progress    Narrative:      LEFT SHOULDER    Blood culture [[id number]] Collected: 10/11/22 1045    Order Status: Sent Specimen: Blood from Peripheral, Hand, Left Updated: 10/11/22 1107    Blood culture [[id number]] Collected: 10/11/22 1040    Order Status: Sent Specimen: Blood from Peripheral, Lower Arm, Right Updated: 10/11/22 1106    Blood culture [[id number]]     Order Status: Canceled Specimen: Blood     Blood culture [[id number]]     Order Status: Canceled Specimen: Blood     Gram stain [[id number]] Collected: 10/11/22 0005    Order Status: Completed Specimen: Joint Fluid from Shoulder, Left Updated: 10/11/22 0159     Gram Stain Result No WBC's      No organisms seen    Narrative:      LEFT SHOULDER    Aerobic culture [[id number]] Collected: 10/11/22 0005    Order Status: Sent Specimen: Shoulder, Left Updated: 10/11/22 0035    Culture, Anaerobic [[id number]] Collected: 10/11/22 0005    Order Status: Sent Specimen: Joint Fluid from Shoulder, Left Updated: 10/11/22 0034    AFB Culture & Smear [[id number]] Collected: 10/11/22 0005    Order Status: Sent Specimen: Joint Fluid from Shoulder, Left Updated: 10/11/22 0034

## 2022-10-11 NOTE — OP NOTE
OP NOTE    DOS:  10/11/2022    Preop Dx: Septic arthritis left shoulder    Osteomyelitis left humeral head with bony abscess/sequestrum    Postop Dx: Septic arthritis left shoulder    Osteomyelitis left humeral head with bony abscess/sequestrum    Significant synovitis left shoulder    Procedure: Arthroscopic irrigation and synovectomy, major left shoulder - 84869    Arthroscopic irrigation left subacromial and subdeltoid bursa with bursectomy    Incision of bone and Curettage of left humeral head for osteomyelitis - 09272    Surgeon: Hayden Frye M.D.    Asst:  Raf Mauro MD, Chilango Dickens M.D    Anesthesia: GETA    EBL:  Minimal    IVF:  1000 cc crystalloid    Implants: None    Specimens: None    Findings: Thorough irrigation and synovectomy/debridement performed.  Bursectomy.  Accessory portal and curettage of sequestrum/bone cyst in humeral head performed.    Dispo:  To PACU extubated/stable       Indications for Procedure:      42-year-old male who has had several weeks of shoulder pain.  Aspiration revealed significant numbers of white blood cells and MRI showed a significant shoulder fusion as well as significant fluid in the subdeltoid subacromial bursa.  Patient also has evidence of bone abscess/sequestrum in the humeral head.  I am taking the operating room for arthroscopic debridement and irrigation.  The risks, benefits and alternatives to surgery discussed the patient as well as his family prior to going to the operating room.  Informed consent was obtained.    Procedure in Detail:    Patient was identified the preop holding area the site was marked.  Patient has been on antibiotics.  A significant aspiration culture was taken prior to surgery.  Patient was wheeled to the operating room placed on the operating table in a supine position.  General endotracheal anesthesia was induced and the patient was placed into a sitting position on the beach chair kennedy with the left shoulder exposed.  All  bony prominences were well padded.  The left shoulder and upper extremity were prepped draped in sterile fashion.  A time-out was undertaken to confirm patient, side, site, surgery, surgeon.  All agreed we proceeded.      A standard posterior portal was established with a 11 blade and the trocar placed into the glenohumeral joint.  This returned some murky fluid.  The camera was placed and initial irrigation with significant normal saline was undertaken.  I then drove the camera forward and established a portal just above the subscapularis and introduced the arthroscopic shaver.  And other more superior portal was established to get toward the humeral head.  A significant amount of synovitis was present and was removed with both the arthroscopic shaver and the ablator.  The biceps tendon was intact with a small amount of degeneration.  The anchor was intact at the 12 o'clock position.  I did remove some frayed labrum which was minimal.  After significant synovectomy was performed I turned my attention toward the rotator cuff and this appeared to be intact at its footprint.      At this point I introduced an arthroscopic probe through the more superior anterior portal.  On the MRI there was an area of bony sequestrum/abscess.  The overall cartilage on the glenoid was intact as well as the humeral head.  I probed this and the patient did have grade 1 chondromalacia in multiple areas.  I found the area which coincided with the MRI anterior just under the biceps tendon and this area was quite spongy and did not have good backing.  I used the probe to poke through the cartilage in this area.  I was able to get into the area of the sequestrum/abscess through that cartilage in into bone.  Immediately suctioned anything that came out of there with the arthroscopic shaver suction.    At this point I introduced a curette through the superior anterior portal just next to the biceps tendon and then put this into the humeral  head and curetted the area of the deep bony abscess/sequestrum.  I also used the arthroscopic shaver to suction out anything that came from that hole in the humeral head as I was doing this.  I then repositioned the arthroscopic shaver through the superior anterior portal and went directly into the area that I just curetted and also used the arthroscopic shaver to remove any more loose or necrotic bone.    Then proceeded to continue irrigation in the glenohumeral joint with significant amounts of normal saline solution.  At the termination of this I suction any remaining fluid from the joint and removed the arthroscopic instrumentation.  I then placed the cannula into the subacromial space from the posterior portal.  I made a lateral portal and introduced the arthroscopic shaver.  The patient had significant subdeltoid and sub acromial fluid which was irrigated the suction from the area.  He also had significant bursitis.  I used the shaver to remove the bursitis.  I introduced the ablator in order to control any bleeding in the area and remove a significant more amount of bursal tissue and ensure there was no bleeding.    After significant debridement and irrigation was undertaken in the subacromial subdeltoid bursa and a significant amount of normal saline solution run through this I suctioned any remaining fluid out of this area to in the removed the arthroscopic instrumentation.  I closed the portal sites with 3-0 nylon suture.  After this in through the posterior portal site I then injected a combination of 1 g vancomycin 2 g cefepime powder diluted into 10 cc of saline, half into the glenohumeral joint and half into the subacromial/subdeltoid bursa.  Sterile dressings were then applied.      All instrument and sponge counts were reported correct in the case.  There were no complications.  The patient was returned to supine position on the hospital bed, extubated, awakened and taken to the recovery room stable  condition.      Plan for the patient:     Will follow the cultures which will be most accurate from the initial aspiration and treat accordingly.  The patient had some significant changes in the humeral head and evidence of osteomyelitis and will likely require a prolonged period of IV antibiotics and following his inflammatory markers, followed by a test of cure weeks after he comes off the antibiotics to ensure the 7 continued normalized.  Will follow clinically and see if he needs another washout but we got a very thorough 1 at this juncture.  Multimodal pain management limiting narcotics.    Hayden Frye MD

## 2022-10-11 NOTE — H&P
[facility] ([address] [place] [address] Medicine  History & Physical    Patient Name: Pavel Field  MRN: [id number]  Patient Class: IP- Inpatient  Admission Date: 10/10/2022  Attending Physician: Marisa Mina MD   Primary Care Provider: Ingrid Madrigal MD         Patient information was obtained from patient, caregiver / friend and ER records.     Subjective:     Principal Problem:Septic arthritis of shoulder, left    Chief Complaint:   Chief Complaint   Patient presents with    Shoulder Pain     X 2 weeks, denies falls or trauma.         HPI: 43 yo M with extensive pmhx including anxiety, depression, IDDM, HLD, HTN, seizures, developmental delay presents with a chief complaint of left shoulder pain.  History is assisted by the patient's caregiver.  Patient has 24 hour caregivers.  He has been complaining of left shoulder pain for the past 2.5 weeks.  Pain is constant.  Pain is worsened with changes in weather and movement.  No trauma or increased use.  Patient is suspicious that he may have slept on it wrong.  Prior to this time, he has had no previous shoulder issues.  He has tried taking Tylenol Arthritis and Biofreeze without resolution of symptoms.  No fevers.  No history of gout.  No history of IVDU.        Mr. Field is a 42-year-old male with a past medical history hypertension, seizures, developmental delay, hyperlipidemia, type 1 diabetes mellitus on insulin (concurrently using Dexcom 6), anxiety and depression.  He presents to the emergency department with chief complaint of left shoulder pain, that started 2 weeks ago after falling in the bath tub.  Denies trauma to the head, loss of consciousness or lacerations.  Patient states that pain was manageable at the beginning but progressively got worse limiting his range of motion.  Today pain is described as dull, 2/10 at rest and 7/10 with movement.  Pain is partially alleviated by Tylenol and Biofreeze.  Denies localized joint swelling, erythema,  fevers, chills, left arm paresthesias or weakness.    In the ED shoulder xray with no acute osseous abnormality of the left shoulder. Previously described possible left shoulder subluxation or dislocation is not seen on these radiographic views.  Orthopedics was consulted.  ESR of 104 CRPP 137 in white blood count of 8.5 shoulder was aspirated with marked increase white blood cell count.  MRI of the shoulder with evidence of synovitis and capsulitis concerning for septic arthritis, osteomyelitis of the humeral head, intramuscular edema involving deltoid and rotator cuff muscles concerning for myositis and so acromial bursitis and biceps tenosynovitis. No evidence of soft tissue abscess      Past Medical History:   Diagnosis Date    Anxiety     Depression     Diabetes mellitus     DKA (diabetic ketoacidoses)     Glaucoma     Hyperlipemia     Hypertension     Seizures        Past Surgical History:   Procedure Laterality Date    EYE SURGERY      cataracts - left 2019       Review of patient's allergies indicates:  No Known Allergies    No current facility-administered medications on file prior to encounter.     Current Outpatient Medications on File Prior to Encounter   Medication Sig    atorvastatin (LIPITOR) 80 MG tablet Take 80 mg by mouth once daily.    BASAGLAR KWIKPEN U-100 INSULIN glargine 100 units/mL (3mL) SubQ pen Inject 18 Units into the skin every evening. (Patient taking differently: Inject 25 Units into the skin every evening.)    carBAMazepine (TEGRETOL) 200 mg tablet Take 400 mg by mouth 2 (two) times daily.    furosemide (LASIX) 20 MG tablet Take 20 mg by mouth once daily.    lisinopriL (PRINIVIL,ZESTRIL) 20 MG tablet Take 20 tablets by mouth once daily.    ondansetron (ZOFRAN) 4 MG tablet Take 1 tablet (4 mg total) by mouth every 6 (six) hours.    ondansetron (ZOFRAN-ODT) 4 MG TbDL Take 1 tablet (4 mg total) by mouth every 6 (six) hours as needed (nausea and vomiting).    promethazine  "(PHENERGAN) 25 MG tablet Take 1 tablet (25 mg total) by mouth every 6 (six) hours as needed for Nausea.    venlafaxine (EFFEXOR-XR) 75 MG 24 hr capsule Take 1 capsule by mouth once daily.    BD ULTRA-FINE BRUNILDA PEN NEEDLE 32 gauge x 5/32" Ndle Inject 1 Units into the skin 4 (four) times daily.    insulin aspart U-100 (NOVOLOG) 100 unit/mL InPn pen Inject 8 Units into the skin 3 (three) times daily with meals.    ondansetron (ZOFRAN-ODT) 4 MG TbDL Take 1 tablet (4 mg total) by mouth every 8 (eight) hours as needed (nausea).     Family History    None       Tobacco Use    Smoking status: Never    Smokeless tobacco: Never   Substance and Sexual Activity    Alcohol use: No    Drug use: No    Sexual activity: Not on file     Review of Systems   Constitutional:  Negative for activity change, chills, diaphoresis and fever.   HENT:  Positive for dental problem (poor dentition). Negative for ear pain and mouth sores.    Respiratory:  Negative for cough and shortness of breath.    Cardiovascular:  Negative for chest pain and leg swelling.   Gastrointestinal:  Negative for constipation, diarrhea and nausea.   Genitourinary:  Negative for difficulty urinating and dysuria.   Musculoskeletal:  Positive for arthralgias and myalgias.   Skin:  Negative for rash.   Neurological:  Negative for weakness and numbness.   Psychiatric/Behavioral:  Negative for agitation and dysphoric mood. The patient is not nervous/anxious.    Objective:     Vital Signs (Most Recent):  Temp: 97.8 °F (36.6 °C) (10/11/22 0816)  Pulse: 75 (10/11/22 1230)  Resp: 16 (10/11/22 1230)  BP: (!) 148/80 (10/11/22 1230)  SpO2: 100 % (10/11/22 1230)   Vital Signs (24h Range):  Temp:  [97 °F (36.1 °C)-98.1 °F (36.7 °C)] 97.8 °F (36.6 °C)  Pulse:  [56-83] 75  Resp:  [10-38] 16  SpO2:  [95 %-100 %] 100 %  BP: (102-185)/() 148/80     Weight: 70.3 kg (155 lb)  Body mass index is 22.24 kg/m².    Physical Exam  Constitutional:       Appearance: Normal " appearance.   HENT:      Head: Normocephalic and atraumatic.      Nose: Nose normal.      Mouth/Throat:      Mouth: Mucous membranes are moist.   Eyes:      Extraocular Movements: Extraocular movements intact.      Pupils: Pupils are equal, round, and reactive to light.   Cardiovascular:      Rate and Rhythm: Normal rate and regular rhythm.      Pulses: Normal pulses.      Heart sounds: Normal heart sounds. No murmur heard.  Pulmonary:      Effort: Pulmonary effort is normal.      Breath sounds: Normal breath sounds.   Abdominal:      General: Abdomen is flat. There is no distension.      Tenderness: There is no abdominal tenderness. There is no rebound.      Comments: Dexcom 6 in left lower quadrant   Musculoskeletal:         General: Tenderness and signs of injury present. No deformity.      Cervical back: Normal range of motion.      Comments: Limited range of motion of the left arm, unable to assess due to pain.  Do not appreciate deformity.   Lymphadenopathy:      Cervical: No cervical adenopathy.   Skin:     Capillary Refill: Capillary refill takes less than 2 seconds.   Neurological:      General: No focal deficit present.      Mental Status: He is alert and oriented to person, place, and time.      Sensory: No sensory deficit.      Motor: Weakness (Left arm, hand ) present.   Psychiatric:         Mood and Affect: Mood normal.         Behavior: Behavior normal.         CRANIAL NERVES     CN III, IV, VI   Pupils are equal, round, and reactive to light.     Significant Labs: All pertinent labs within the past 24 hours have been reviewed.    Significant Imaging: I have reviewed all pertinent imaging results/findings within the past 24 hours.    Assessment/Plan:     * Septic arthritis of shoulder, left  - Patient with radiographic evidence of septic arthritis, osteomyelitis.  -appreciate Orthopedics assistants  -washout to be performed today  -NPO, to resume diabetic diet after procedure  -orthopedics has  requested to hold on antibiotics during admission, antibiotics to be started post procedure  -blood cultures ordered      Developmental disability  On known specifics about developmental disability, patient lives on his own but has caretaker 24/7, patient is oriented at the time person and place and is able to recall the medication he is currently on.      Type 1 diabetes mellitus with hyperglycemia  Patient's FSGs are uncontrolled due to hyperglycemia on current medication regimen.  Last A1c reviewed-   Lab Results   Component Value Date    HGBA1C 11.0 (H) 10/11/2022     Will start on low-dose insulin sliding scale,  Most recent fingerstick glucose reviewed-   Recent Labs   Lab 10/10/22  1254 10/11/22  1047   POCTGLUCOSE 161* 270*       Hold Oral hypoglycemics while patient is in the hospital.  -insulin adjustment prior to discharge  -consider endocrinology consult for appropriate management  -consider nutritional consult with care taker present for optimum diet control.    Seizure disorder  Patient does not remember when was his last seizure, currently on carbamazepine without adverse effects although it could be contributing to his Na of 132 on admission.  -continue with carbamazepine 200 mg    Essential hypertension    Patient current Ali on lisinopril 20 mg daily and furosemide, caretaker this not know his home blood pressures.  In the ED max systolic blood pressure of 160 mmhg, during time frame where patient had joint aspiration and mandibular lesion of his shoulder.  -continue home meds      VTE Risk Mitigation (From admission, onward)         Ordered     IP VTE LOW RISK PATIENT  Once         10/11/22 1016     Place ROSHNI hose  Until discontinued         10/11/22 1016     Place sequential compression device  Until discontinued         10/11/22 1016     Reason for No Pharmacological VTE Prophylaxis  Once        Question:  Reasons:  Answer:  Risk of Bleeding  Comment:  Pre-op    10/11/22 0708     Place  sequential compression device  Until discontinued         10/11/22 0708                   Femi Ahumada MD   PGY1 Internal Medicine  Department of [facility] - Surgery (2nd Fl)

## 2022-10-11 NOTE — ANESTHESIA PROCEDURE NOTES
Interscalene Single Shot Nerve Block    Patient location during procedure: pre-op   Block not for primary anesthetic.  Reason for block: at surgeon's request and post-op pain management   Post-op Pain Location: Left shoulder pain   Start time: 10/11/2022 11:05 AM  Timeout: 10/11/2022 11:05 AM   End time: 10/11/2022 11:10 AM    Staffing  Authorizing Provider: Lucas Simmons MD  Performing Provider: Brian Nicolas MD    Preanesthetic Checklist  Completed: patient identified, IV checked, site marked, risks and benefits discussed, surgical consent, monitors and equipment checked, pre-op evaluation and timeout performed  Peripheral Block  Patient position: sitting  Prep: ChloraPrep  Patient monitoring: heart rate, cardiac monitor, continuous pulse ox, continuous capnometry and frequent blood pressure checks  Block type: interscalene  Laterality: left  Injection technique: single shot  Needle  Needle type: Stimuplex   Needle gauge: 22 G  Needle length: 2 in  Needle localization: anatomical landmarks and ultrasound guidance   -ultrasound image captured on disc.  Assessment  Injection assessment: negative aspiration, negative parasthesia and local visualized surrounding nerve  Paresthesia pain: none  Heart rate change: no  Slow fractionated injection: yes  Pain Tolerance: comfortable throughout block and no complaints  Medications:    Medications: bupivacaine (pf) (MARCAINE) injection 0.5% - Perineural   20 mL - 10/11/2022 11:08:00 AM    Additional Notes  VSS.  [facility] RN monitoring vitals throughout procedure.  Patient tolerated procedure well.

## 2022-10-11 NOTE — ANESTHESIA PREPROCEDURE EVALUATION
[facility]  Anesthesia Pre-Operative Evaluation         Patient Name: Pavel Field  YOB: 1980  MRN: [id number]    SUBJECTIVE:     Pre-operative evaluation for Procedure(s) (LRB):  DEBRIDEMENT, SHOULDER, ARTHROSCOPIC Beachchair, 9L NACL, cysto tubing, culture tubes (Left)     10/11/2022    Pavel Field is a 42 y.o. male w/ a significant PMHx of DM1, HTN, Seizure disorder, and Depression, who presented to the ED with left shoulder pain and found to have septic arthritis of the joint.     Patient now presents for the above procedure(s).       LDA:        Peripheral IV - Single Lumen 10/10/22 1607 20 G Right Forearm (Active)   Site Assessment Clean;Dry;Intact 10/10/22 1607   Line Status Blood return noted;Saline locked 10/10/22 1607   Number of days: 0       Prev airway: None documented.    Drips: None documented.    Patient Active Problem List   Diagnosis    Diabetic ketoacidosis without coma associated with type 1 diabetes mellitus    Essential hypertension    Seizure disorder    Type 1 diabetes mellitus with hyperglycemia    Orthostatic hypotension    Type 2 diabetes mellitus with hyperglycemia, with long-term current use of insulin    BRYSON (acute kidney injury)    Acute metabolic encephalopathy    Severe malnutrition    Developmental disability    Fatigue    Depression    Leukocytosis    Alteration in skin integrity    Septic arthritis of shoulder, left       Review of patient's allergies indicates:  No Known Allergies    Current Outpatient Medications:    Current Facility-Administered Medications:     atorvastatin tablet 80 mg, 80 mg, Oral, Daily, Femi Ahumada MD    carBAMazepine tablet 400 mg, 400 mg, Oral, BID, Femi Ahumada MD    furosemide tablet 20 mg, 20 mg, Oral, Daily, Femi Ahumada MD    glucagon (human recombinant) injection 1 mg, 1 mg, Intramuscular, PRN, Femi Ahumada MD    lisinopriL tablet 20 mg, 20 mg, Oral,  "Daily, Femi Ahumada MD    ondansetron tablet 4 mg, 4 mg, Oral, Q8H PRN, Femi Ahumada MD    venlafaxine 24 hr capsule 75 mg, 75 mg, Oral, Daily, Femi Ahumada MD    Current Outpatient Medications:     atorvastatin (LIPITOR) 80 MG tablet, Take 80 mg by mouth once daily., Disp: , Rfl:     BASAGLAR KWIKPEN U-100 INSULIN glargine 100 units/mL (3mL) SubQ pen, Inject 18 Units into the skin every evening. (Patient taking differently: Inject 25 Units into the skin every evening.), Disp: 1 Box, Rfl: 1    carBAMazepine (TEGRETOL) 200 mg tablet, Take 400 mg by mouth 2 (two) times daily., Disp: , Rfl: 5    furosemide (LASIX) 20 MG tablet, Take 20 mg by mouth once daily., Disp: , Rfl:     lisinopriL (PRINIVIL,ZESTRIL) 20 MG tablet, Take 20 tablets by mouth once daily., Disp: , Rfl:     ondansetron (ZOFRAN) 4 MG tablet, Take 1 tablet (4 mg total) by mouth every 6 (six) hours., Disp: 12 tablet, Rfl: 0    ondansetron (ZOFRAN-ODT) 4 MG TbDL, Take 1 tablet (4 mg total) by mouth every 6 (six) hours as needed (nausea and vomiting)., Disp: 20 tablet, Rfl: 0    promethazine (PHENERGAN) 25 MG tablet, Take 1 tablet (25 mg total) by mouth every 6 (six) hours as needed for Nausea., Disp: 15 tablet, Rfl: 0    venlafaxine (EFFEXOR-XR) 75 MG 24 hr capsule, Take 1 capsule by mouth once daily., Disp: , Rfl: 5    BD ULTRA-FINE BRUNILDA PEN NEEDLE 32 gauge x 5/32" Ndle, Inject 1 Units into the skin 4 (four) times daily., Disp: , Rfl: 6    insulin aspart U-100 (NOVOLOG) 100 unit/mL InPn pen, Inject 8 Units into the skin 3 (three) times daily with meals., Disp: 1 Box, Rfl: 1    ondansetron (ZOFRAN-ODT) 4 MG TbDL, Take 1 tablet (4 mg total) by mouth every 8 (eight) hours as needed (nausea)., Disp: 12 tablet, Rfl: 0    Past Surgical History:   Procedure Laterality Date    EYE SURGERY      cataracts - left 2019       Social History     Socioeconomic History    Marital status: Single   Tobacco Use    Smoking " status: Never    Smokeless tobacco: Never   Substance and Sexual Activity    Alcohol use: No    Drug use: No       OBJECTIVE:     Vital Signs Range (Last 24H):  Temp:  [36.1 °C (97 °F)-36.7 °C (98.1 °F)]   Pulse:  [56-84]   Resp:  [10-38]   BP: (102-185)/()   SpO2:  [95 %-100 %]       Significant Labs:  Lab Results   Component Value Date    WBC 7.15 10/11/2022    HGB 10.2 (L) 10/11/2022    HCT 33.8 (L) 10/11/2022     10/11/2022    ALT 20 10/11/2022    AST 21 10/11/2022     (L) 10/11/2022    K 4.7 10/11/2022    CL 99 10/11/2022    CREATININE 1.4 10/11/2022    BUN 20 10/11/2022    CO2 22 (L) 10/11/2022    TSH 2.898 08/17/2019    INR 1.1 10/11/2022    HGBA1C 10.0 (H) 07/15/2022       Diagnostic Studies: No relevant studies.    EKG:   Results for orders placed or performed during the hospital encounter of 08/21/22   EKG 12-lead    Collection Time: 08/21/22 10:21 PM    Narrative    Test Reason : R07.9,    Vent. Rate : 080 BPM     Atrial Rate : 080 BPM     P-R Int : 146 ms          QRS Dur : 102 ms      QT Int : 404 ms       P-R-T Axes : 081 071 038 degrees     QTc Int : 465 ms    Normal sinus rhythm  Normal ECG  When compared with ECG of 26-NOV-2019 21:05,  No significant change was found  Confirmed by Jose BLANCO MD ([id number]) on 8/22/2022 9:07:06 AM    Referred By: [email or web address]   SELF           Confirmed By:Jose BLANCO MD       2D ECHO:  TTE:  No results found for this or any previous visit.    KITTY:  No results found for this or any previous visit.    ASSESSMENT/PLAN:                                                                                                                  10/11/2022  Pavel Field is a 42 y.o., male.      Pre-op Assessment    I have reviewed the Patient Summary Reports.     I have reviewed the Nursing Notes. I have reviewed the NPO Status.   I have reviewed the Medications.     Review of Systems  Anesthesia Hx:  No problems with previous Anesthesia    Cardiovascular:   Hypertension  Denies MI.  Denies CAD.     Denies CHF.    Pulmonary:  Pulmonary Normal    Hepatic/GI:  Hepatic/GI Normal  Denies GERD.    Neurological:   Neuromuscular Disease, Seizures    Endocrine:   Diabetes    Psych:   Psychiatric History depression             Anesthesia Plan  Type of Anesthesia, risks & benefits discussed:    Anesthesia Type: Gen ETT, Gen Supraglottic Airway, Gen Natural Airway  Intra-op Monitoring Plan: Standard ASA Monitors  Post Op Pain Control Plan: multimodal analgesia and IV/PO Opioids PRN  Induction:  IV  Airway Plan: Direct  Informed Consent: Informed consent signed with the Patient and all parties understand the risks and agree with anesthesia plan.  All questions answered.   ASA Score: 3  Day of Surgery Review of History & Physical: H&P Update referred to the surgeon/provider.    Ready For Surgery From Anesthesia Perspective.     .

## 2022-10-11 NOTE — ASSESSMENT & PLAN NOTE
Patient's FSGs are uncontrolled due to hyperglycemia on current medication regimen.  Last A1c reviewed-   Lab Results   Component Value Date    HGBA1C 11.0 (H) 10/11/2022     Will start on low-dose insulin sliding scale,  Most recent fingerstick glucose reviewed-   Recent Labs   Lab 10/10/22  1254 10/11/22  1047   POCTGLUCOSE 161* 270*       Hold Oral hypoglycemics while patient is in the hospital.  -insulin adjustment prior to discharge  -consider endocrinology consult for appropriate management  -consider nutritional consult with care taker present for optimum diet control.

## 2022-10-11 NOTE — PROVIDER PROGRESS NOTES - EMERGENCY DEPT.
ED Resident HAND-OFF NOTE:  11:15 PM 10/10/2022  Pavel Field is a 42 y.o. male who presented to the ED on 10/10/2022 and has been managed by  and Dr. Ashford, who reports patient C/O shoulder pain. I assumed care of patient from off-going ED physician team at 11:15 PM pending orthopedics eval and final dispo.    On my evaluation, Pavel Field appears well and is hemodynamically stable. Thus far, Pavel Field has received:  Medications   LIDOcaine 5 % patch 1 patch (1 patch Transdermal Patch Applied 10/10/22 1317)   propofol (DIPRIVAN) 10 mg/mL IVP injection (has no administration in time range)   ibuprofen tablet 600 mg (600 mg Oral Given 10/10/22 1316)   morphine injection 4 mg (4 mg Intravenous Given 10/10/22 1608)   propofol (DIPRIVAN) 10 mg/mL IVP (40 mg Intravenous Given by Provider 10/10/22 1828)   ondansetron injection ( Intramuscular Canceled Entry 10/10/22 1830)   propofol (DIPRIVAN) 10 mg/mL IVP (40 mg Intravenous Given 10/10/22 1832)       On my exam, I appreciate:  BP (!) 133/95   Pulse 65   Temp 98.1 °F (36.7 °C)   Resp 16   Wt 70.3 kg (155 lb)   SpO2 98%   BMI 22.24 kg/m²     ED Course as of 10/10/22 2315   Mon Oct 10, 2022   2227 CRP(!): 137.1 [NN]   2228 WBC: 8.50 [NN]   2228 Sed Rate(!): 104 [NN]      ED Course User Index  [NN] Angélica Hagen MD        Additional ED course:  4:07 AM  Orthopedics performed arthrocentesis, which showed findings concerning for septic joint.  Plan is for OR for washout in the morning and hold off on antibiotics per Orthopedic's recommendation.  Patient admitted to hospital medicine    Disposition:  Admit  I have discussed and counseled Pavel Field regarding exam, results, diagnosis, treatment, and plan.  ______________________  Brian Arciniega MD   Emergency Medicine Resident  10/10/2022

## 2022-10-11 NOTE — ANESTHESIA PROCEDURE NOTES
Intubation    Date/Time: 10/11/2022 12:55 PM  Performed by: Kiara oNva CRNA  Authorized by: Rosio Gates MD     Intubation:     Induction:  Intravenous    Intubated:  Postinduction    Mask Ventilation:  Easy mask    Attempts:  1    Attempted By:  CRNA    Method of Intubation:  Direct    Blade:  Abdiel 3    Laryngeal View Grade: Grade III - only epiglottis visible      Difficult Airway Encountered?: No      Complications:  None    Airway Device:  Oral endotracheal tube    Airway Device Size:  7.5    Style/Cuff Inflation:  Cuffed (inflated to minimal occlusive pressure)    Tube secured:  23    Secured at:  The lips    Placement Verified By:  Capnometry    Complicating Factors:  Anterior larynx    Findings Post-Intubation:  BS equal bilateral and atraumatic/condition of teeth unchanged

## 2022-10-11 NOTE — PROCEDURES
Orthopedic Injury    Date/Time: 10/11/2022 1:06 AM  Performed by: Rios Ashford MD  Authorized by: Douglas Magallon MD     Location procedure was performed:  [facility] EMERGENCY DEPARTMENT  Assisting Provider:  Angélica Hagen MD  Consent Done?:  Yes  Universal Protocol:     Verbal consent obtained?: Yes      Written consent obtained?: Yes      Risks and benefits: Risks, benefits and alternatives were discussed      Consent given by:  Patient    Patient states understanding of procedure being performed: Yes      Patient's understanding of procedure matches consent: Yes      Patient identity confirmed:  Name, , MRN and verbally with patient    Time Out: Immediately prior to the procedure a time out was called    Injury:     Injury location:  Shoulder    Location details:  Left shoulder    Injury type:  Dislocation    Dislocation type: anterior      Chronicity:  New    Hill-Sachs deformity?: No        Pre-procedure assessment:     Neurovascular status: Neurovascularly intact      Distal perfusion: normal      Neurological function: normal      Range of motion: reduced      Local anesthesia used?: No      Patient sedated?: Yes      ASA Class:  Class 3 - Systemic Illness with functional impairment.    Mallampati Score:  Class 1 - Visualization of the soft palate, fauces, uvula, and anterior/posterior pillars.    Patient/Family history of anesthesia or sedation complications: No      Sedation type: moderate (conscious) sedation      Sedation:  Propofol    Sedation start:  10/10/2022 5:31 PM    Sedation end:  10/10/2022 6:35 PM      Selections made in this section will also lock the Injury type section above.:     Manipulation performed?: Yes      Reduction method:  Traction and counter traction    Reduction method:  Traction and counter traction    Reduction method:  Traction and counter traction    Reduction method:  Traction and counter traction    Reduction method:  Traction and counter traction    Reduction  method:  Traction and counter traction    Reduction successful?: Yes      Confirmation: Reduction confirmed by x-ray      Immobilization:  Sling  Post-procedure assessment:     Neurovascular status: Neurovascularly intact      Distal perfusion: normal      Neurological function: normal      Range of motion: improved      Noted that on repeat XR following reduction, my independent interpretation shows improvement of joint space, however there is still some widening.     Patient later underwent arthrocentesis of that same shoulder and a 3rd xr done. Joint space is improved and no longer shows dislocation or possible subluxation

## 2022-10-11 NOTE — SUBJECTIVE & OBJECTIVE
"Past Medical History:   Diagnosis Date    Anxiety     Depression     Diabetes mellitus     DKA (diabetic ketoacidoses)     Glaucoma     Hyperlipemia     Hypertension     Seizures        Past Surgical History:   Procedure Laterality Date    EYE SURGERY      cataracts - left 2019       Review of patient's allergies indicates:  No Known Allergies    No current facility-administered medications on file prior to encounter.     Current Outpatient Medications on File Prior to Encounter   Medication Sig    atorvastatin (LIPITOR) 80 MG tablet Take 80 mg by mouth once daily.    BASAGLAR KWIKPEN U-100 INSULIN glargine 100 units/mL (3mL) SubQ pen Inject 18 Units into the skin every evening. (Patient taking differently: Inject 25 Units into the skin every evening.)    carBAMazepine (TEGRETOL) 200 mg tablet Take 400 mg by mouth 2 (two) times daily.    furosemide (LASIX) 20 MG tablet Take 20 mg by mouth once daily.    lisinopriL (PRINIVIL,ZESTRIL) 20 MG tablet Take 20 tablets by mouth once daily.    ondansetron (ZOFRAN) 4 MG tablet Take 1 tablet (4 mg total) by mouth every 6 (six) hours.    ondansetron (ZOFRAN-ODT) 4 MG TbDL Take 1 tablet (4 mg total) by mouth every 6 (six) hours as needed (nausea and vomiting).    promethazine (PHENERGAN) 25 MG tablet Take 1 tablet (25 mg total) by mouth every 6 (six) hours as needed for Nausea.    venlafaxine (EFFEXOR-XR) 75 MG 24 hr capsule Take 1 capsule by mouth once daily.    BD ULTRA-FINE BRUNILDA PEN NEEDLE 32 gauge x 5/32" Ndle Inject 1 Units into the skin 4 (four) times daily.    insulin aspart U-100 (NOVOLOG) 100 unit/mL InPn pen Inject 8 Units into the skin 3 (three) times daily with meals.    ondansetron (ZOFRAN-ODT) 4 MG TbDL Take 1 tablet (4 mg total) by mouth every 8 (eight) hours as needed (nausea).     Family History    None       Tobacco Use    Smoking status: Never    Smokeless tobacco: Never   Substance and Sexual Activity    Alcohol use: No    Drug use: No    Sexual activity: Not " on file     Review of Systems   Constitutional:  Negative for activity change, chills, diaphoresis and fever.   HENT:  Positive for dental problem (poor dentition). Negative for ear pain and mouth sores.    Respiratory:  Negative for cough and shortness of breath.    Cardiovascular:  Negative for chest pain and leg swelling.   Gastrointestinal:  Negative for constipation, diarrhea and nausea.   Genitourinary:  Negative for difficulty urinating and dysuria.   Musculoskeletal:  Positive for arthralgias and myalgias.   Skin:  Negative for rash.   Neurological:  Negative for weakness and numbness.   Psychiatric/Behavioral:  Negative for agitation and dysphoric mood. The patient is not nervous/anxious.    Objective:     Vital Signs (Most Recent):  Temp: 97.8 °F (36.6 °C) (10/11/22 0816)  Pulse: 75 (10/11/22 1230)  Resp: 16 (10/11/22 1230)  BP: (!) 148/80 (10/11/22 1230)  SpO2: 100 % (10/11/22 1230)   Vital Signs (24h Range):  Temp:  [97 °F (36.1 °C)-98.1 °F (36.7 °C)] 97.8 °F (36.6 °C)  Pulse:  [56-83] 75  Resp:  [10-38] 16  SpO2:  [95 %-100 %] 100 %  BP: (102-185)/() 148/80     Weight: 70.3 kg (155 lb)  Body mass index is 22.24 kg/m².    Physical Exam  Constitutional:       Appearance: Normal appearance.   HENT:      Head: Normocephalic and atraumatic.      Nose: Nose normal.      Mouth/Throat:      Mouth: Mucous membranes are moist.   Eyes:      Extraocular Movements: Extraocular movements intact.      Pupils: Pupils are equal, round, and reactive to light.   Cardiovascular:      Rate and Rhythm: Normal rate and regular rhythm.      Pulses: Normal pulses.      Heart sounds: Normal heart sounds. No murmur heard.  Pulmonary:      Effort: Pulmonary effort is normal.      Breath sounds: Normal breath sounds.   Abdominal:      General: Abdomen is flat. There is no distension.      Tenderness: There is no abdominal tenderness. There is no rebound.      Comments: Dexcom 6 in left lower quadrant   Musculoskeletal:          General: Tenderness and signs of injury present. No deformity.      Cervical back: Normal range of motion.      Comments: Limited range of motion of the left arm, unable to assess due to pain.  Do not appreciate deformity.   Lymphadenopathy:      Cervical: No cervical adenopathy.   Skin:     Capillary Refill: Capillary refill takes less than 2 seconds.   Neurological:      General: No focal deficit present.      Mental Status: He is alert and oriented to person, place, and time.      Sensory: No sensory deficit.      Motor: Weakness (Left arm, hand ) present.   Psychiatric:         Mood and Affect: Mood normal.         Behavior: Behavior normal.         CRANIAL NERVES     CN III, IV, VI   Pupils are equal, round, and reactive to light.     Significant Labs: All pertinent labs within the past 24 hours have been reviewed.    Significant Imaging: I have reviewed all pertinent imaging results/findings within the past 24 hours.

## 2022-10-11 NOTE — ASSESSMENT & PLAN NOTE
Pavel Field is a 42 y.o. male presenting with left shoulder pain for the past 2 weeks. he does not have a history of gout. , , WBC 8.5, afebrile. Denies any recent fevers or chills. No reported recent prior infections. No recent recent antibiotics.     -- Aspiration at bedside   -- Hold Abx until after OR  -- Hold chemical DVT PPX until after OR   -- Plan for OR today for I&D   -- Consult ID for assistance with antibiotics  -- WBAT   -- Elevate affected extremity     Procedure Note: Left shoulder aspiration  Patient was explained risks, benefits, and alternatives to treatment and verbalized consent to proceed. Time out was performed and patient name, , site, and procedure were confirmed. Skin was sterilely prepared with alcohol solution. 18 gauge needle on a 60 cc syringe was used for aspiration through posterior approach. 60 cc of cloudy yellow colored fluid collected. Fluid and cultures were obtained and sent for analysis. Aspiration site was covered with a bandaid.      Lab Results   Component Value Date    WBCBF 547997 10/11/2022    SEGS 94 10/11/2022    CRYST Negative 10/11/2022    BODYFLUIDTYP Joint Fluid Left Shoulder 10/11/2022

## 2022-10-11 NOTE — PROGRESS NOTES
Patient oriented to new room. VSS. IV saline locked, infiltrated when attempted to flush. Incision site CDI. No c/o pain. SCDs on. Will admit pt and continue to monitor.

## 2022-10-11 NOTE — NURSING TRANSFER
Nursing Transfer Note      10/11/2022     Reason patient is being transferred: post procedure    Transfer To: [address]    Transfer via stretcher    Transfer with n/a    Transported by PCT x1    Medicines sent: aspart insulin pen    Any special needs or follow-up needed: routine    Chart send with patient: Yes    Notified: caregiver    Patient reassessed at: 10/11/2022 at 1616

## 2022-10-11 NOTE — ASSESSMENT & PLAN NOTE
- Patient with radiographic evidence of septic arthritis, osteomyelitis.  -appreciate Orthopedics assistants  -washout to be performed today  -NPO, to resume diabetic diet after procedure  -orthopedics has requested to hold on antibiotics during admission, antibiotics to be started post procedure  -blood cultures ordered

## 2022-10-11 NOTE — PROVIDER PROGRESS NOTES - EMERGENCY DEPT.
Encounter Date: 10/10/2022    ED Physician Progress Notes          ED staff SIGN OUT NOTE    Patient s/o to me by Dr. Hagen  pending ortho evaluation     Patient evaluated by ortho who completed a bedside tap with cloudy fluid, will repeat imaging to confirm in place     Synovial fluid was remarkable for neg gram stain, neg crystals, however, > 100,000 WBC, concerning for ?septic arthropathy    Patient was admitted with plan for washout with ortho

## 2022-10-11 NOTE — PT/OT/SLP PROGRESS
Occupational Therapy      Patient Name:  Pavel Field   MRN:  [id number]    Patient not seen today secondary to Off the floor for procedure/surgery. Will follow-up 10/12/22.    CONI Chandler    10/11/2022

## 2022-10-12 LAB
ALBUMIN SERPL BCP-MCNC: 2.1 G/DL (ref 3.5–5.2)
ALP SERPL-CCNC: 96 U/L (ref 55–135)
ALT SERPL W/O P-5'-P-CCNC: 20 U/L (ref 10–44)
ANION GAP SERPL CALC-SCNC: 7 MMOL/L (ref 8–16)
AST SERPL-CCNC: 21 U/L (ref 10–40)
BASOPHILS # BLD AUTO: 0.03 K/UL (ref 0–0.2)
BASOPHILS NFR BLD: 0.2 % (ref 0–1.9)
BILIRUB SERPL-MCNC: 0.1 MG/DL (ref 0.1–1)
BUN SERPL-MCNC: 23 MG/DL (ref 6–20)
CALCIUM SERPL-MCNC: 8.7 MG/DL (ref 8.7–10.5)
CHLORIDE SERPL-SCNC: 98 MMOL/L (ref 95–110)
CO2 SERPL-SCNC: 25 MMOL/L (ref 23–29)
CREAT SERPL-MCNC: 1.3 MG/DL (ref 0.5–1.4)
DIFFERENTIAL METHOD: ABNORMAL
EOSINOPHIL # BLD AUTO: 0 K/UL (ref 0–0.5)
EOSINOPHIL NFR BLD: 0.2 % (ref 0–8)
ERYTHROCYTE [DISTWIDTH] IN BLOOD BY AUTOMATED COUNT: 13.2 % (ref 11.5–14.5)
EST. GFR  (NO RACE VARIABLE): >60 ML/MIN/1.73 M^2
ESTIMATED AVG GLUCOSE: 260 MG/DL (ref 68–131)
GLUCOSE SERPL-MCNC: 402 MG/DL (ref 70–110)
HBA1C MFR BLD: 10.7 % (ref 4–5.6)
HCT VFR BLD AUTO: 32.4 % (ref 40–54)
HGB BLD-MCNC: 9.8 G/DL (ref 14–18)
IMM GRANULOCYTES # BLD AUTO: 0.05 K/UL (ref 0–0.04)
IMM GRANULOCYTES NFR BLD AUTO: 0.4 % (ref 0–0.5)
LYMPHOCYTES # BLD AUTO: 1 K/UL (ref 1–4.8)
LYMPHOCYTES NFR BLD: 7.8 % (ref 18–48)
MCH RBC QN AUTO: 26.1 PG (ref 27–31)
MCHC RBC AUTO-ENTMCNC: 30.2 G/DL (ref 32–36)
MCV RBC AUTO: 86 FL (ref 82–98)
MONOCYTES # BLD AUTO: 1.1 K/UL (ref 0.3–1)
MONOCYTES NFR BLD: 8.8 % (ref 4–15)
NEUTROPHILS # BLD AUTO: 10.2 K/UL (ref 1.8–7.7)
NEUTROPHILS NFR BLD: 82.6 % (ref 38–73)
NRBC BLD-RTO: 0 /100 WBC
PLATELET # BLD AUTO: 381 K/UL (ref 150–450)
PMV BLD AUTO: 10.4 FL (ref 9.2–12.9)
POCT GLUCOSE: 210 MG/DL (ref 70–110)
POCT GLUCOSE: 289 MG/DL (ref 70–110)
POCT GLUCOSE: 345 MG/DL (ref 70–110)
POCT GLUCOSE: 391 MG/DL (ref 70–110)
POCT GLUCOSE: 407 MG/DL (ref 70–110)
POTASSIUM SERPL-SCNC: 4.9 MMOL/L (ref 3.5–5.1)
PROT SERPL-MCNC: 7.4 G/DL (ref 6–8.4)
RBC # BLD AUTO: 3.75 M/UL (ref 4.6–6.2)
SODIUM SERPL-SCNC: 130 MMOL/L (ref 136–145)
WBC # BLD AUTO: 12.34 K/UL (ref 3.9–12.7)

## 2022-10-12 PROCEDURE — 36415 COLL VENOUS BLD VENIPUNCTURE: CPT

## 2022-10-12 PROCEDURE — 25000003 PHARM REV CODE 250: Performed by: INTERNAL MEDICINE

## 2022-10-12 PROCEDURE — 99223 1ST HOSP IP/OBS HIGH 75: CPT | Mod: ,,, | Performed by: INTERNAL MEDICINE

## 2022-10-12 PROCEDURE — 97165 OT EVAL LOW COMPLEX 30 MIN: CPT

## 2022-10-12 PROCEDURE — 11000001 HC ACUTE MED/SURG PRIVATE ROOM

## 2022-10-12 PROCEDURE — 25000003 PHARM REV CODE 250: Performed by: STUDENT IN AN ORGANIZED HEALTH CARE EDUCATION/TRAINING PROGRAM

## 2022-10-12 PROCEDURE — 99233 PR SUBSEQUENT HOSPITAL CARE,LEVL III: ICD-10-PCS | Mod: ,,, | Performed by: INTERNAL MEDICINE

## 2022-10-12 PROCEDURE — 99233 SBSQ HOSP IP/OBS HIGH 50: CPT | Mod: ,,, | Performed by: INTERNAL MEDICINE

## 2022-10-12 PROCEDURE — 83036 HEMOGLOBIN GLYCOSYLATED A1C: CPT

## 2022-10-12 PROCEDURE — 63600175 PHARM REV CODE 636 W HCPCS

## 2022-10-12 PROCEDURE — 97161 PT EVAL LOW COMPLEX 20 MIN: CPT

## 2022-10-12 PROCEDURE — 99223 PR INITIAL HOSPITAL CARE,LEVL III: ICD-10-PCS | Mod: ,,, | Performed by: INTERNAL MEDICINE

## 2022-10-12 PROCEDURE — 25000003 PHARM REV CODE 250

## 2022-10-12 PROCEDURE — 63600175 PHARM REV CODE 636 W HCPCS: Performed by: STUDENT IN AN ORGANIZED HEALTH CARE EDUCATION/TRAINING PROGRAM

## 2022-10-12 PROCEDURE — 63600175 PHARM REV CODE 636 W HCPCS: Performed by: INTERNAL MEDICINE

## 2022-10-12 PROCEDURE — 80053 COMPREHEN METABOLIC PANEL: CPT

## 2022-10-12 PROCEDURE — 97535 SELF CARE MNGMENT TRAINING: CPT

## 2022-10-12 PROCEDURE — 97110 THERAPEUTIC EXERCISES: CPT

## 2022-10-12 PROCEDURE — 97116 GAIT TRAINING THERAPY: CPT

## 2022-10-12 PROCEDURE — 85025 COMPLETE CBC W/AUTO DIFF WBC: CPT

## 2022-10-12 RX ORDER — ENOXAPARIN SODIUM 100 MG/ML
40 INJECTION SUBCUTANEOUS EVERY 24 HOURS
Status: DISCONTINUED | OUTPATIENT
Start: 2022-10-12 | End: 2022-10-14 | Stop reason: HOSPADM

## 2022-10-12 RX ORDER — INSULIN ASPART 100 [IU]/ML
10 INJECTION, SOLUTION INTRAVENOUS; SUBCUTANEOUS ONCE
Status: COMPLETED | OUTPATIENT
Start: 2022-10-12 | End: 2022-10-12

## 2022-10-12 RX ADMIN — INSULIN ASPART 6 UNITS: 100 INJECTION, SOLUTION INTRAVENOUS; SUBCUTANEOUS at 12:10

## 2022-10-12 RX ADMIN — GABAPENTIN 300 MG: 300 CAPSULE ORAL at 02:10

## 2022-10-12 RX ADMIN — CEFEPIME 2 G: 2 INJECTION, POWDER, FOR SOLUTION INTRAVENOUS at 05:10

## 2022-10-12 RX ADMIN — METHOCARBAMOL 500 MG: 500 TABLET ORAL at 02:10

## 2022-10-12 RX ADMIN — METHOCARBAMOL 500 MG: 500 TABLET ORAL at 09:10

## 2022-10-12 RX ADMIN — INSULIN ASPART 4 UNITS: 100 INJECTION, SOLUTION INTRAVENOUS; SUBCUTANEOUS at 09:10

## 2022-10-12 RX ADMIN — ENOXAPARIN SODIUM 40 MG: 100 INJECTION SUBCUTANEOUS at 05:10

## 2022-10-12 RX ADMIN — CARBAMAZEPINE 400 MG: 200 TABLET ORAL at 09:10

## 2022-10-12 RX ADMIN — METHOCARBAMOL 500 MG: 500 TABLET ORAL at 05:10

## 2022-10-12 RX ADMIN — VANCOMYCIN HYDROCHLORIDE 1000 MG: 1 INJECTION, POWDER, LYOPHILIZED, FOR SOLUTION INTRAVENOUS at 05:10

## 2022-10-12 RX ADMIN — INSULIN ASPART 10 UNITS: 100 INJECTION, SOLUTION INTRAVENOUS; SUBCUTANEOUS at 09:10

## 2022-10-12 RX ADMIN — ATORVASTATIN CALCIUM 80 MG: 40 TABLET, FILM COATED ORAL at 09:10

## 2022-10-12 RX ADMIN — VANCOMYCIN HYDROCHLORIDE 1000 MG: 1 INJECTION, POWDER, LYOPHILIZED, FOR SOLUTION INTRAVENOUS at 06:10

## 2022-10-12 RX ADMIN — VENLAFAXINE HYDROCHLORIDE 75 MG: 75 CAPSULE, EXTENDED RELEASE ORAL at 09:10

## 2022-10-12 RX ADMIN — LISINOPRIL 20 MG: 20 TABLET ORAL at 09:10

## 2022-10-12 RX ADMIN — GABAPENTIN 300 MG: 300 CAPSULE ORAL at 09:10

## 2022-10-12 RX ADMIN — CEFEPIME 2 G: 2 INJECTION, POWDER, FOR SOLUTION INTRAVENOUS at 02:10

## 2022-10-12 RX ADMIN — ACETAMINOPHEN 1000 MG: 500 TABLET ORAL at 05:10

## 2022-10-12 RX ADMIN — FUROSEMIDE 20 MG: 20 TABLET ORAL at 09:10

## 2022-10-12 RX ADMIN — ACETAMINOPHEN 1000 MG: 500 TABLET ORAL at 09:10

## 2022-10-12 RX ADMIN — ACETAMINOPHEN 1000 MG: 500 TABLET ORAL at 02:10

## 2022-10-12 RX ADMIN — CEFEPIME 2 G: 2 INJECTION, POWDER, FOR SOLUTION INTRAVENOUS at 09:10

## 2022-10-12 RX ADMIN — INSULIN ASPART 10 UNITS: 100 INJECTION, SOLUTION INTRAVENOUS; SUBCUTANEOUS at 10:10

## 2022-10-12 RX ADMIN — INSULIN ASPART 4 UNITS: 100 INJECTION, SOLUTION INTRAVENOUS; SUBCUTANEOUS at 05:10

## 2022-10-12 NOTE — PT/OT/SLP EVAL
Physical Therapy Evaluation, Tx and Discharge Note    Patient Name:  Pavel Field   MRN:  [id number]    Recommendations:     Discharge Recommendations:  home   Discharge Equipment Recommendations: none   Barriers to discharge: None    Assessment:     Pavel Field is a 42 y.o. male admitted with a medical diagnosis of Septic arthritis of shoulder, left. .  At this time, patient is functioning at their prior level of function and does not require further acute PT services.     Recent Surgery: Procedure(s) (LRB):  DEBRIDEMENT, SHOULDER, ARTHROSCOPIC  (Left)  SYNOVECTOMY, SHOULDER (Left) 1 Day Post-Op    Plan:     During this hospitalization, patient does not require further acute PT services.  Please re-consult if situation changes.      Subjective     Chief Complaint: none noted  Patient/Family Comments/goals: to go home  Pain/Comfort:  Pain Rating 1: 0/10  Pain Addressed 1: Pre-medicate for activity, Reposition  Pain Rating Post-Intervention 1: 0/10    Patients cultural, spiritual, Jainism conflicts given the current situation: no    Living Environment:  Pt lives on first floor of apartment complex. Pt has a tub/ shower combo.  Prior to admission, patients level of function was (I)-SPV for functional mobility 2/2 visual impairments.  Equipment used at home: none.  Upon discharge, patient will have assistance from aide (24/7, SPV).    Objective:     Communicated with RN prior to session.  Patient found up in chair with SCD upon PT entry to room.    General Precautions: Standard, fall   Orthopedic Precautions:RUE non weight bearing   Braces: Shoulder abduction brace   Respiratory Status: Room air    Exams:  Cognitive Exam:  Patient is oriented to Person, Place, Time, and Situation  Postural Exam:  Patient presented with the following abnormalities:    -       No postural abnormalities identified  Sensation:    -       Intact  RLE ROM: WFL  RLE Strength: WFL  LLE ROM: WFL  LLE Strength: WFL    Functional  Mobility:  Transfers:     Sit to Stand:  independence with no AD  Gait: Pt ambulated >300 ft with SBA and no AD  Balance: Good, Pt able to ambulate while performing horizontal and vertical head turns and no LOB    AM-PAC 6 CLICK MOBILITY  Total Score:18       Therapeutic Activities and Exercises:   Role of PT in POC  Importance of continued OOB mobility to prevent functional decline; instructed to ambulate in halls with SPV at least 1x/day.    AM-PAC 6 CLICK MOBILITY  Total Score:18     Patient left up in chair with all lines intact, call button in reach, and RN notified.    GOALS:   Multidisciplinary Problems       Physical Therapy Goals       Not on file                    History:     Past Medical History:   Diagnosis Date    Anxiety     Depression     Diabetes mellitus     DKA (diabetic ketoacidoses)     Glaucoma     Hyperlipemia     Hypertension     Seizures        Past Surgical History:   Procedure Laterality Date    ARTHROSCOPIC DEBRIDEMENT OF SHOULDER Left 10/11/2022    Procedure: DEBRIDEMENT, SHOULDER, ARTHROSCOPIC ;  Surgeon: Hayden Frye MD;  Location: [address];  Service: Orthopedics;  Laterality: Left;    EYE SURGERY      cataracts - left 2019    SYNOVECTOMY OF SHOULDER Left 10/11/2022    Procedure: SYNOVECTOMY, SHOULDER;  Surgeon: Hayden Frye MD;  Location: [address];  Service: Orthopedics;  Laterality: Left;       Time Tracking:     PT Received On: 10/12/22  PT Start Time: 1020     PT Stop Time: 1036  PT Total Time (min): 16 min     Billable Minutes: Evaluation 8 and Gait Training 8      10/12/2022

## 2022-10-12 NOTE — ASSESSMENT & PLAN NOTE
Pavel Field is a 42 y.o. male presenting with left shoulder pain for the past 2 weeks. he does not have a history of gout. , , WBC 8.5, afebrile. Denies any recent fevers or chills. No reported recent prior infections. No recent recent antibiotics.     --status post irrigation and debridement of the left shoulder for septic arthritis on 10/11/2022  --PT/OT daily   --given MRI and surgical findings of likely osteomyelitis of the humeral head he will require extended course of IV antibiotic therapy  --will follow up ID recommendations   --cultures no growth today  --continue IV antibiotics empirically at this time

## 2022-10-12 NOTE — SUBJECTIVE & OBJECTIVE
"Past Medical History:   Diagnosis Date    Anxiety     Depression     Diabetes mellitus     DKA (diabetic ketoacidoses)     Glaucoma     Hyperlipemia     Hypertension     Seizures        Past Surgical History:   Procedure Laterality Date    ARTHROSCOPIC DEBRIDEMENT OF SHOULDER Left 10/11/2022    Procedure: DEBRIDEMENT, SHOULDER, ARTHROSCOPIC ;  Surgeon: Hayden Frye MD;  Location: [address];  Service: Orthopedics;  Laterality: Left;    EYE SURGERY      cataracts - left 2019    SYNOVECTOMY OF SHOULDER Left 10/11/2022    Procedure: SYNOVECTOMY, SHOULDER;  Surgeon: Hayden Frye MD;  Location: [address];  Service: Orthopedics;  Laterality: Left;       Review of patient's allergies indicates:   Allergen Reactions    Vitamin c, e, zinc, copper combination no.11 Hives       Medications:  Medications Prior to Admission   Medication Sig    atorvastatin (LIPITOR) 80 MG tablet Take 80 mg by mouth once daily.    BASAGLAR KWIKPEN U-100 INSULIN glargine 100 units/mL (3mL) SubQ pen Inject 18 Units into the skin every evening. (Patient taking differently: Inject 25 Units into the skin every evening.)    carBAMazepine (TEGRETOL) 200 mg tablet Take 400 mg by mouth 2 (two) times daily.    furosemide (LASIX) 20 MG tablet Take 20 mg by mouth once daily.    lisinopriL (PRINIVIL,ZESTRIL) 20 MG tablet Take 20 tablets by mouth once daily.    ondansetron (ZOFRAN) 4 MG tablet Take 1 tablet (4 mg total) by mouth every 6 (six) hours.    ondansetron (ZOFRAN-ODT) 4 MG TbDL Take 1 tablet (4 mg total) by mouth every 6 (six) hours as needed (nausea and vomiting).    promethazine (PHENERGAN) 25 MG tablet Take 1 tablet (25 mg total) by mouth every 6 (six) hours as needed for Nausea.    venlafaxine (EFFEXOR-XR) 75 MG 24 hr capsule Take 1 capsule by mouth once daily.    BD ULTRA-FINE BRUNILDA PEN NEEDLE 32 gauge x 5/32" Ndle Inject 1 Units into the skin 4 (four) times daily.    insulin aspart U-100 (NOVOLOG) 100 unit/mL InPn pen Inject " 8 Units into the skin 3 (three) times daily with meals.    ondansetron (ZOFRAN-ODT) 4 MG TbDL Take 1 tablet (4 mg total) by mouth every 8 (eight) hours as needed (nausea).     Antibiotics (From admission, onward)      Start     Stop Route Frequency Ordered    10/12/22 0430  vancomycin in dextrose 5 % 1 gram/250 mL IVPB 1,000 mg        See Hyperspace for full Linked Orders Report.    -- IV Every 12 hours (non-standard times) 10/11/22 1521    10/11/22 1600  cefepime in dextrose 5 % IVPB 2 g         -- IV Every 8 hours (non-standard times) 10/11/22 1457    10/11/22 1556  vancomycin - pharmacy to dose  (vancomycin IVPB)        See Hyperspace for full Linked Orders Report.    -- IV pharmacy to manage frequency 10/11/22 1457          Antifungals (From admission, onward)      None          Antivirals (From admission, onward)      None               There is no immunization history on file for this patient.    Family History    None       Social History     Socioeconomic History    Marital status: Single   Tobacco Use    Smoking status: Never    Smokeless tobacco: Never   Substance and Sexual Activity    Alcohol use: No    Drug use: No     Review of Systems   Constitutional:  Negative for chills, diaphoresis, fatigue and fever.   HENT:  Negative for congestion, ear pain, nosebleeds, rhinorrhea and sore throat.    Eyes:  Negative for pain.   Respiratory:  Negative for cough, shortness of breath and wheezing.    Cardiovascular:  Negative for chest pain and leg swelling.   Gastrointestinal:  Negative for abdominal pain, constipation, diarrhea, nausea and vomiting.   Genitourinary:  Negative for dysuria, frequency and urgency.   Musculoskeletal:  Negative for arthralgias, back pain and neck pain.   Neurological:  Negative for weakness, numbness and headaches.   Objective:     Vital Signs (Most Recent):  Temp: 98.5 °F (36.9 °C) (10/12/22 1613)  Pulse: 76 (10/12/22 1613)  Resp: 18 (10/12/22 1613)  BP: 108/67 (10/12/22  1613)  SpO2: 97 % (10/12/22 1613) Vital Signs (24h Range):  Temp:  [97.5 °F (36.4 °C)-98.6 °F (37 °C)] 98.5 °F (36.9 °C)  Pulse:  [70-93] 76  Resp:  [16-20] 18  SpO2:  [95 %-100 %] 97 %  BP: (106-132)/(58-86) 108/67     Weight: 68 kg (149 lb 14.6 oz)  Body mass index is 21.51 kg/m².    Estimated Creatinine Clearance: 71.9 mL/min (based on SCr of 1.3 mg/dL).    Physical Exam  Vitals and nursing note reviewed.   Constitutional:       General: He is not in acute distress.     Appearance: He is well-developed. He is not diaphoretic.   HENT:      Head: Normocephalic and atraumatic.      Mouth/Throat:      Comments: Poor dentition  Eyes:      Pupils: Pupils are equal, round, and reactive to light.   Cardiovascular:      Rate and Rhythm: Normal rate and regular rhythm.      Heart sounds: Normal heart sounds. No murmur heard.    No friction rub. No gallop.   Pulmonary:      Effort: Pulmonary effort is normal. No respiratory distress.      Breath sounds: Normal breath sounds. No wheezing or rales.   Chest:      Chest wall: No tenderness.   Abdominal:      General: Bowel sounds are normal. There is no distension.      Palpations: There is no mass.      Tenderness: There is no abdominal tenderness. There is no guarding.   Musculoskeletal:         General: No deformity. Normal range of motion.      Cervical back: Normal range of motion and neck supple.      Comments: Left shoulder in brace     Skin:     General: Skin is warm and dry.      Findings: No erythema or rash.      Comments: Abrasions of lower extremity   Neurological:      Mental Status: He is alert and oriented to person, place, and time.   Psychiatric:         Behavior: Behavior normal.         Thought Content: Thought content normal.         Judgment: Judgment normal.       Significant Labs: All pertinent labs within the past 24 hours have been reviewed.    Significant Imaging: I have reviewed all pertinent imaging results/findings within the past 24 hours.

## 2022-10-12 NOTE — PT/OT/SLP EVAL
"Occupational Therapy   Evaluation    Name: Pavel Field  MRN: [id number]  Admitting Diagnosis:  Septic arthritis of shoulder, left  Recent Surgery: Procedure(s) (LRB):  DEBRIDEMENT, SHOULDER, ARTHROSCOPIC  (Left)  SYNOVECTOMY, SHOULDER (Left) 1 Day Post-Op    Recommendations:     Discharge Recommendations: home health OT  Discharge Equipment Recommendations:  none  Barriers to discharge:  None    Assessment:     Pavel Field is a 42 y.o. male with a medical diagnosis of Septic arthritis of shoulder, left.  He presents with increased pain. Pt lives on the first floor at an apartment with an aid 24/7 to help with cooking, cleaning, driving, etc. Pt was able to perform all functional mobility and transfers with CGA and no AD. Pt ROM and strength was limited in LUE due to increased shoulder pain. Pt was provided HEP to complete shoulder exercises 3x a day. Performance deficits affecting function: weakness, impaired self care skills, decreased upper extremity function, impaired functional mobility, pain.      Rehab Prognosis: Good; patient would benefit from acute skilled OT services to address these deficits and reach maximum level of function.       Plan:     Patient to be seen 3 x/week to address the above listed problems via self-care/home management, therapeutic activities, therapeutic exercises  Plan of Care Expires: 10/26/22  Plan of Care Reviewed with: patient    Subjective     Chief Complaint: Pain  Patient/Family Comments/goals: "I have a high pain tolerance and didn't tell anyone I was hurt"     Occupational Profile:  Living Environment: Pt lives on first floor of apartment complex. Pt has a tub/ shower combo.  Previous level of function: min A for ADLs and IADLs  Roles and Routines: Disabled  Equipment Used at Home:  none  Assistance upon Discharge: Pt has an aid 24 hrs a day.     Pain/Comfort:  Pain Rating 1: 5/10    Patients cultural, spiritual, Religion conflicts given the current situation: " no    Objective:     Communicated with: RN prior to session.  Patient found supine with SCD, peripheral IV upon OT entry to room.    General Precautions: Standard, fall   Orthopedic Precautions:LUE weight bearing as tolerated   Braces: UE Sling  Respiratory Status: Room air    Occupational Performance:    Bed Mobility:    Patient completed Scooting/Bridging with stand by assistance  Patient completed Supine to Sit with stand by assistance    Functional Mobility/Transfers:  Patient completed Sit <> Stand Transfer with contact guard assistance  with  no assistive device   Patient completed Bed <> Chair Transfer using Step Transfer technique with contact guard assistance with no assistive device  Functional Mobility: Pt was able to walk to bathroom to complete toileting with CGA and return to room to chair.     Activities of Daily Living:  Grooming: contact guard assistance to wash hands while standing at sink .   Upper Body Dressing: moderate assistance to don hospital gown while sitting EOB.   Lower Body Dressing: stand by assistance to don socks while sitting EOB.  Toileting: contact guard assistance to complete toileting while standing above toilet.     Cognitive/Visual Perceptual:  Cognitive/Psychosocial Skills:     -       Oriented to: Person, Place, Time, and Situation   -       Follows Commands/attention:Follows multistep  commands  -       Communication: clear/fluent  -       Memory: No Deficits noted  -       Safety awareness/insight to disability: intact   -       Mood/Affect/Coping skills/emotional control: Appropriate to situation    Physical Exam:  Upper Extremity Range of Motion:     -       Right Upper Extremity: WFL  -       Left Upper Extremity: Limited shoulder ROM due to increased pain.  Upper Extremity Strength:    -       Right Upper Extremity: WFL  -       Left Upper Extremity: Limited shoulder strength due to increased pain.    AMPAC 6 Click ADL:  AMPAC Total Score: 20    Treatment &  Education:  -Pt educated on role of OT and POC.   -Pt educated on transfer.  -Pt shoulder flexion limited to 90 degrees.   -Pt shoulder abduction limited to 50 degrees.   -Pt provided HEP to be completed 3x a day- 10 forward shoulder rolls, 10 backwards shoulder rolls, 10 passive shoulder flexion, 10 active shoulder flexion, 10 adduction reps to touch shoulder, 10 scapular retraction/ protraction     Patient left up in chair with all lines intact, call button in reach, and RN notified    GOALS:   Multidisciplinary Problems       Occupational Therapy Goals          Problem: Occupational Therapy    Goal Priority Disciplines Outcome Interventions   Occupational Therapy Goal     OT, PT/OT Ongoing, Progressing    Description: Goals to be met by: 10/26/22     Patient will increase functional independence with ADLs by performing:    UE Dressing with Modified [place].  LE Dressing with Modified [place].  Grooming while standing at sink with Modified [place].  Toileting from toilet with Modified [place] for hygiene and clothing management.   Bathing from shower chair with Modified [place].  Toilet transfer to toilet with Modified [place].                         History:     Past Medical History:   Diagnosis Date    Anxiety     Depression     Diabetes mellitus     DKA (diabetic ketoacidoses)     Glaucoma     Hyperlipemia     Hypertension     Seizures          Past Surgical History:   Procedure Laterality Date    ARTHROSCOPIC DEBRIDEMENT OF SHOULDER Left 10/11/2022    Procedure: DEBRIDEMENT, SHOULDER, ARTHROSCOPIC ;  Surgeon: Hayden Frye MD;  Location: [address];  Service: Orthopedics;  Laterality: Left;    EYE SURGERY      cataracts - left 2019    SYNOVECTOMY OF SHOULDER Left 10/11/2022    Procedure: SYNOVECTOMY, SHOULDER;  Surgeon: Hayden Frye MD;  Location: [address];  Service: Orthopedics;  Laterality: Left;       Time Tracking:     OT Date of Treatment: 10/12/22  OT  Start Time: 0811  OT Stop Time: 0855  OT Total Time (min): 44 min    Billable Minutes:Evaluation 10  Self Care/Home Management 20  Therapeutic Exercise 14    10/12/2022

## 2022-10-12 NOTE — PROGRESS NOTES
[facility] Medicine  Progress Note    Patient Name: Pavel Field  MRN: [id number]  Patient Class: IP- Inpatient   Admission Date: 10/10/2022  Length of Stay: 1 days  Attending Physician: Marisa Mina MD  Primary Care Provider: Ingrid Madrigal MD        Subjective:     Principal Problem:Septic arthritis of shoulder, left        HPI:  41 yo M with extensive pmhx including anxiety, depression, IDDM, HLD, HTN, seizures, developmental delay presents with a chief complaint of left shoulder pain.  History is assisted by the patient's caregiver.  Patient has 24 hour caregivers.  He has been complaining of left shoulder pain for the past 2.5 weeks.  Pain is constant.  Pain is worsened with changes in weather and movement.  No trauma or increased use.  Patient is suspicious that he may have slept on it wrong.  Prior to this time, he has had no previous shoulder issues.  He has tried taking Tylenol Arthritis and Biofreeze without resolution of symptoms.  No fevers.  No history of gout.  No history of IVDU.        Mr. Field is a 42-year-old male with a past medical history hypertension, seizures, developmental delay, hyperlipidemia, type 1 diabetes mellitus on insulin (concurrently using Dexcom 6), anxiety and depression.  He presents to the emergency department with chief complaint of left shoulder pain, that started 2 weeks ago after falling in the bath tub.  Denies trauma to the head, loss of consciousness or lacerations.  Patient states that pain was manageable at the beginning but progressively got worse limiting his range of motion.  Today pain is described as dull, 2/10 at rest and 7/10 with movement.  Pain is partially alleviated by Tylenol and Biofreeze.  Denies localized joint swelling, erythema, fevers, chills, left arm paresthesias or weakness.    In the ED shoulder xray with no acute osseous abnormality of the left shoulder. Previously described possible left shoulder subluxation or  dislocation is not seen on these radiographic views.  Orthopedics was consulted.  ESR of 104 CRPP 137 in white blood count of 8.5 shoulder was aspirated with marked increase white blood cell count.  MRI of the shoulder with evidence of synovitis and capsulitis concerning for septic arthritis, osteomyelitis of the humeral head, intramuscular edema involving deltoid and rotator cuff muscles concerning for myositis and so acromial bursitis and biceps tenosynovitis. No evidence of soft tissue abscess      Overview/Hospital Course:  Underwent a successful I&D by Ortho on 10/11, with cultures taken (still pending on 10/12).  Empiric Vanc + cefepime started, and ID is following.  Pain was well-controlled following surgery.  His DM meds were largely held prior to surgery but resumed soon thereafter.      Interval History: NAEON. See hospital course.      Review of Systems   Constitutional:  Negative for chills, diaphoresis, fatigue and fever.   HENT:  Negative for congestion, ear pain, nosebleeds, rhinorrhea and sore throat.    Eyes:  Negative for pain.   Respiratory:  Negative for cough, shortness of breath and wheezing.    Cardiovascular:  Negative for chest pain and leg swelling.   Gastrointestinal:  Negative for abdominal pain, constipation, diarrhea, nausea and vomiting.   Genitourinary:  Negative for dysuria, frequency and urgency.   Musculoskeletal:  Negative for arthralgias, back pain and neck pain.   Neurological:  Negative for weakness, numbness and headaches.   Objective:     Vital Signs (Most Recent):  Temp: 98.5 °F (36.9 °C) (10/12/22 1613)  Pulse: 76 (10/12/22 1613)  Resp: 18 (10/12/22 1613)  BP: 108/67 (10/12/22 1613)  SpO2: 97 % (10/12/22 1613) Vital Signs (24h Range):  Temp:  [97.5 °F (36.4 °C)-98.6 °F (37 °C)] 98.5 °F (36.9 °C)  Pulse:  [70-93] 76  Resp:  [16-20] 18  SpO2:  [96 %-100 %] 97 %  BP: (106-123)/(58-69) 108/67     Weight: 68 kg (149 lb 14.6 oz)  Body mass index is 21.51 kg/m².    Intake/Output  Summary (Last 24 hours) at 10/12/2022 1745  Last data filed at 10/12/2022 1230  Gross per 24 hour   Intake 440 ml   Output 1325 ml   Net -885 ml      Physical Exam  Vitals and nursing note reviewed.   Constitutional:       General: He is not in acute distress.     Appearance: He is well-developed. He is not diaphoretic.   HENT:      Head: Normocephalic and atraumatic.      Mouth/Throat:      Comments: Poor dentition  Eyes:      Pupils: Pupils are equal, round, and reactive to light.   Cardiovascular:      Rate and Rhythm: Normal rate and regular rhythm.      Heart sounds: Normal heart sounds. No murmur heard.    No friction rub. No gallop.   Pulmonary:      Effort: Pulmonary effort is normal. No respiratory distress.      Breath sounds: Normal breath sounds. No wheezing or rales.   Chest:      Chest wall: No tenderness.   Abdominal:      General: Bowel sounds are normal. There is no distension.      Palpations: There is no mass.      Tenderness: There is no abdominal tenderness. There is no guarding.   Musculoskeletal:         General: No deformity. Normal range of motion.      Cervical back: Normal range of motion and neck supple.      Comments: Left shoulder in brace     Skin:     General: Skin is warm and dry.      Findings: No erythema or rash.      Comments: Abrasions of lower extremity   Neurological:      Mental Status: He is alert and oriented to person, place, and time.   Psychiatric:         Behavior: Behavior normal.         Thought Content: Thought content normal.         Judgment: Judgment normal.       Significant Labs: All pertinent labs within the past 24 hours have been reviewed.  Blood Culture:   Recent Labs   Lab 10/11/22  1040 10/11/22  1045   LABBLOO No Growth to date  No Growth to date No Growth to date  No Growth to date     CBC:   Recent Labs   Lab 10/10/22  2143 10/11/22  0443 10/12/22  0537   WBC 8.50 7.15 12.34   HGB 10.4* 10.2* 9.8*   HCT 35.4* 33.8* 32.4*    366 381     CMP:    Recent Labs   Lab 10/10/22  2143 10/11/22  0443 10/12/22  0537   * 131* 130*   K 4.5 4.7 4.9   CL 99 99 98   CO2 26 22* 25   * 282* 402*   BUN 19 20 23*   CREATININE 1.4 1.4 1.3   CALCIUM 8.9 9.3 8.7   PROT 8.3 8.3 7.4   ALBUMIN 2.3* 2.3* 2.1*   BILITOT 0.2 0.2 0.1   ALKPHOS 115 107 96   AST 18 21 21   ALT 20 20 20   ANIONGAP 7* 10 7*     Magnesium:   Recent Labs   Lab 10/11/22  0443   MG 1.6       Significant Imaging: I have reviewed all pertinent imaging results/findings within the past 24 hours.      Assessment/Plan:      * Septic arthritis of shoulder, left  - Patient with radiographic evidence of septic arthritis, osteomyelitis.  Underwent successful I&D w washout 10/11.  -appreciate Orthopedics assistants  -resume diabetic diet after procedure  -IV Vanc and cefepime started pending wound Cx  -ID following  -blood cultures ordered      Developmental disability  On known specifics about developmental disability, patient lives on his own but has caretaker 24/7, patient is oriented at the time person and place and is able to recall the medication he is currently on.      Type 1 diabetes mellitus with hyperglycemia  Patient's FSGs are uncontrolled due to hyperglycemia on current medication regimen.  Last A1c reviewed-   Lab Results   Component Value Date    HGBA1C 10.7 (H) 10/12/2022     Will start on low-dose insulin sliding scale,  Most recent fingerstick glucose reviewed-   Recent Labs   Lab 10/12/22  0541 10/12/22  0825 10/12/22  1147 10/12/22  1607   POCTGLUCOSE 407* 391* 289* 210*       Hold Oral hypoglycemics while patient is in the hospital.  -insulin adjustment prior to discharge  -consider endocrinology consult for appropriate management  -consider nutritional consult with care taker present for optimum diet control.    Seizure disorder  Patient does not remember when was his last seizure, currently on carbamazepine without adverse effects although it could be contributing to his Na of 132  on admission.  -continue with carbamazepine 200 mg    Essential hypertension    Patient current Ali on lisinopril 20 mg daily and furosemide, caretaker this not know his home blood pressures.  In the ED max systolic blood pressure of 160 mmhg, during time frame where patient had joint aspiration and mandibular lesion of his shoulder.  -continue home meds      VTE Risk Mitigation (From admission, onward)         Ordered     enoxaparin injection 40 mg  Daily         10/12/22 1425     IP VTE LOW RISK PATIENT  Once         10/11/22 1016     Place ROSHNI hose  Until discontinued         10/11/22 1016     Place sequential compression device  Until discontinued         10/11/22 1016     Place sequential compression device  Until discontinued         10/11/22 0708                Discharge Planning   MILADIS: 10/14/2022     Code Status: Full Code   Is the patient medically ready for discharge?:     Reason for patient still in hospital (select all that apply): Laboratory test, Treatment, Imaging and Consult recommendations  Discharge Plan A: Home, Home Health          Doug Chu MD  Department of Hospital Medicine   [facility] - Surgery

## 2022-10-12 NOTE — ANESTHESIA POSTPROCEDURE EVALUATION
Anesthesia Post Evaluation    Patient: Pavel Field    Procedure(s) Performed: Procedure(s) (LRB):  DEBRIDEMENT, SHOULDER, ARTHROSCOPIC  (Left)  SYNOVECTOMY, SHOULDER (Left)    Final Anesthesia Type: general      Patient location during evaluation: PACU  Patient participation: Yes- Able to Participate  Level of consciousness: awake and alert and oriented  Post-procedure vital signs: reviewed and stable  Pain management: adequate  Airway patency: patent    PONV status at discharge: No PONV  Anesthetic complications: no      Cardiovascular status: hemodynamically stable  Respiratory status: nasal cannula  Hydration status: euvolemic  Follow-up not needed.          Vitals Value Taken Time   /69 10/12/22 0545   Temp 36.4 °C (97.5 °F) 10/12/22 0545   Pulse 73 10/12/22 0545   Resp 16 10/12/22 0545   SpO2 96 % 10/12/22 0545         Event Time   Out of Recovery 10/11/2022 15:30:00         Pain/Jerald Score: Pain Rating Prior to Med Admin: 4 (10/12/2022  5:33 AM)  Pain Rating Post Med Admin: 5 (10/11/2022  4:15 PM)  Jerald Score: 9 (10/11/2022  3:45 PM)

## 2022-10-12 NOTE — HPI
41 y/o male with poorly controlled DMII, (HgbA1c 11%), HTN, HLD, seizures, developmental delay presents with left shoulder pain x 2 weeks. ID consulted for septic arthritis and osteomyelitis of left shoulder. S/p I&D with orthopedics 10/11. ID consulted for abx recommendations.     Per chart review, History is assisted by the patient's caregiver.  Patient has 24 hour caregivers.  He has been complaining of left shoulder pain for the past 2.5 weeks.  Pain is constant.  Pain is worsened with changes in weather and movement.  No trauma or increased use.  Patient is suspicious that he may have slept on it wrong. Started to happen after he fell in the bath tub.  Prior to this time, he has had no previous shoulder issues.  He has tried taking Tylenol Arthritis and Biofreeze without resolution of symptoms.  No fevers.  No history of gout.  No history of IVDU.  ESR of 104 CRPP 137 in white blood count of 8.5 shoulder was aspirated with marked increase white blood cell count.  MRI of the shoulder with evidence of synovitis and capsulitis concerning for septic arthritis, osteomyelitis of the humeral head, intramuscular edema involving deltoid and rotator cuff muscles concerning for myositis and so acromial bursitis and biceps tenosynovitis. No evidence of soft tissue abscess

## 2022-10-12 NOTE — PROGRESS NOTES
[facility] - [facility]  Orthopedics  Progress Note    Patient Name: Pavel Field  MRN: [id number]  Admission Date: 10/10/2022  Hospital Length of Stay: 1 days  Attending Provider: Marisa Mina MD  Primary Care Provider: Ingrid Madrigal MD  Follow-up For: Procedure(s) (LRB):  DEBRIDEMENT, SHOULDER, ARTHROSCOPIC  (Left)  SYNOVECTOMY, SHOULDER (Left)    Post-Operative Day: 1 Day Post-Op  Subjective:     Principal Problem:Septic arthritis of shoulder, left    Principal Orthopedic Problem: Same    Interval History: NAEON, patient stable, pain controlled. No acute complaints this morning.   Doing well with PT, Dressings CDI, sling in place     Review of patient's allergies indicates:   Allergen Reactions    Vitamin c, e, zinc, copper combination no.11 Hives       Current Facility-Administered Medications   Medication    acetaminophen tablet 1,000 mg    aluminum-magnesium hydroxide-simethicone 200-200-20 mg/5 mL suspension 30 mL    atorvastatin tablet 80 mg    carBAMazepine tablet 400 mg    cefepime in dextrose 5 % IVPB 2 g    dextrose 10% bolus 125 mL    dextrose 10% bolus 250 mL    enoxaparin injection 40 mg    furosemide tablet 20 mg    gabapentin capsule 300 mg    glucagon (human recombinant) injection 1 mg    glucose chewable tablet 16 g    glucose chewable tablet 24 g    HYDROmorphone injection 1 mg    insulin aspart U-100 pen 1-10 Units    insulin detemir U-100 pen 18 Units    lisinopriL tablet 20 mg    methocarbamoL tablet 500 mg    morphine injection 2 mg    naloxone 0.4 mg/mL injection 0.02 mg    ondansetron tablet 4 mg    prochlorperazine injection Soln 5 mg    sodium chloride 0.9% flush 10 mL    sodium chloride 0.9% flush 10 mL    vancomycin - pharmacy to dose    vancomycin in dextrose 5 % 1 gram/250 mL IVPB 1,000 mg    venlafaxine 24 hr capsule 75 mg     Objective:     Vital Signs (Most Recent):  Temp: 98.5 °F (36.9 °C) (10/12/22 1613)  Pulse: 76 (10/12/22 1613)  Resp: 18  "(10/12/22 1613)  BP: 108/67 (10/12/22 1613)  SpO2: 97 % (10/12/22 1613) Vital Signs (24h Range):  Temp:  [97.5 °F (36.4 °C)-98.6 °F (37 °C)] 98.5 °F (36.9 °C)  Pulse:  [70-93] 76  Resp:  [13-20] 18  SpO2:  [95 %-100 %] 97 %  BP: (106-132)/(57-86) 108/67     Weight: 68 kg (149 lb 14.6 oz)  Height: 5' 10" (177.8 cm)  Body mass index is 21.51 kg/m².      Intake/Output Summary (Last 24 hours) at 10/12/2022 1622  Last data filed at 10/12/2022 1230  Gross per 24 hour   Intake 740 ml   Output 1325 ml   Net -585 ml       Ortho/SPM Exam    LUE:  Dressings CDI, sling in place   Motor intact wrist flexion/extension, finger flexion/extension, interossei  Sensation intact axillary, radial, median, ulnar nerves  Palp rad pulse, BCR      Significant Labs: CBC:   Recent Labs   Lab 10/10/22  2143 10/11/22  0443 10/12/22  0537   WBC 8.50 7.15 12.34   HGB 10.4* 10.2* 9.8*   HCT 35.4* 33.8* 32.4*    366 381     CMP:   Recent Labs   Lab 10/10/22  2143 10/11/22  0443 10/12/22  0537   * 131* 130*   K 4.5 4.7 4.9   CL 99 99 98   CO2 26 22* 25   * 282* 402*   BUN 19 20 23*   CREATININE 1.4 1.4 1.3   CALCIUM 8.9 9.3 8.7   PROT 8.3 8.3 7.4   ALBUMIN 2.3* 2.3* 2.1*   BILITOT 0.2 0.2 0.1   ALKPHOS 115 107 96   AST 18 21 21   ALT 20 20 20   ANIONGAP 7* 10 7*     All pertinent labs within the past 24 hours have been reviewed.    Significant Imaging: I have reviewed and interpreted all pertinent imaging results/findings.    Assessment/Plan:     * Septic arthritis of shoulder, left  Pavel Field is a 42 y.o. male presenting with left shoulder pain for the past 2 weeks. he does not have a history of gout. , , WBC 8.5, afebrile. Denies any recent fevers or chills. No reported recent prior infections. No recent recent antibiotics.     --status post irrigation and debridement of the left shoulder for septic arthritis on 10/11/2022  --PT/OT daily   --given MRI and surgical findings of likely osteomyelitis of the " humeral head he will require extended course of IV antibiotic therapy  --will follow up ID recommendations   --cultures no growth today  --continue IV antibiotics empirically at this time          Chilango Dickens MD  Orthopedics  [facility] - Surgery

## 2022-10-12 NOTE — PLAN OF CARE
Problem: Occupational Therapy  Goal: Occupational Therapy Goal  Description: Goals to be met by: 10/26/22     Patient will increase functional independence with ADLs by performing:    UE Dressing with Modified [place].  LE Dressing with Modified [place].  Grooming while standing at sink with Modified [place].  Toileting from toilet with Modified [place] for hygiene and clothing management.   Bathing from shower chair with Modified [place].  Toilet transfer to toilet with Modified [place].    Outcome: Ongoing, Progressing

## 2022-10-12 NOTE — CONSULTS
[facility]  Infectious Disease  Consult Note    Patient Name: Pavel Field  MRN: [id number]  Admission Date: 10/10/2022  Hospital Length of Stay: 1 days  Attending Physician: Marisa Mina MD  Primary Care Provider: Ingrid Madrigal MD     Isolation Status: No active isolations      Inpatient consult to Infectious Diseases  Consult performed by: Jessie Guerrero PA-C  Consult ordered by: Chilango Dickens MD        Assessment/Plan:     * Septic arthritis of shoulder, left  41 y/o male with poorly controlled DM, (HgbA1c 11%), HTN, HLD, seizures, developmental delay presents with left shoulder pain x 2 weeks. ID consulted for septic arthritis and osteomyelitis of left shoulder. S/p I&D with orthopedics 10/11. ID consulted for abx recommendations.     Recommendations  1. Continue vancomycin and cefepime at this time  2. Follow up aspiration culture data. No surgical cultures sent  3. Anticipate long term IV abx therapy  4. ID will follow closely with you       Thank you for your consult. I will follow-up with patient. Please contact us if you have any additional questions.    Jessie Guerrero PA-C  Infectious Disease  [facility]    Subjective:     Principal Problem: Septic arthritis of shoulder, left    HPI: 41 y/o male with poorly controlled DMII, (HgbA1c 11%), HTN, HLD, seizures, developmental delay presents with left shoulder pain x 2 weeks. ID consulted for septic arthritis and osteomyelitis of left shoulder. S/p I&D with orthopedics 10/11. ID consulted for abx recommendations.     Per chart review, History is assisted by the patient's caregiver.  Patient has 24 hour caregivers.  He has been complaining of left shoulder pain for the past 2.5 weeks.  Pain is constant.  Pain is worsened with changes in weather and movement.  No trauma or increased use.  Patient is suspicious that he may have slept on it wrong. Started to happen after he fell in the bath tub.  Prior to this time, he has had no  previous shoulder issues.  He has tried taking Tylenol Arthritis and Biofreeze without resolution of symptoms.  No fevers.  No history of gout.  No history of IVDU.  ESR of 104 CRPP 137 in white blood count of 8.5 shoulder was aspirated with marked increase white blood cell count.  MRI of the shoulder with evidence of synovitis and capsulitis concerning for septic arthritis, osteomyelitis of the humeral head, intramuscular edema involving deltoid and rotator cuff muscles concerning for myositis and so acromial bursitis and biceps tenosynovitis. No evidence of soft tissue abscess      Past Medical History:   Diagnosis Date    Anxiety     Depression     Diabetes mellitus     DKA (diabetic ketoacidoses)     Glaucoma     Hyperlipemia     Hypertension     Seizures        Past Surgical History:   Procedure Laterality Date    ARTHROSCOPIC DEBRIDEMENT OF SHOULDER Left 10/11/2022    Procedure: DEBRIDEMENT, SHOULDER, ARTHROSCOPIC ;  Surgeon: Hayden rFye MD;  Location: [facility] OR [address] [place] [address];  Service: Orthopedics;  Laterality: Left;    EYE SURGERY      cataracts - left 2019    SYNOVECTOMY OF SHOULDER Left 10/11/2022    Procedure: SYNOVECTOMY, SHOULDER;  Surgeon: Hayden Frye MD;  Location: [address];  Service: Orthopedics;  Laterality: Left;       Review of patient's allergies indicates:   Allergen Reactions    Vitamin c, e, zinc, copper combination no.11 Hives       Medications:  Medications Prior to Admission   Medication Sig    atorvastatin (LIPITOR) 80 MG tablet Take 80 mg by mouth once daily.    BASAGLAR KWIKPEN U-100 INSULIN glargine 100 units/mL (3mL) SubQ pen Inject 18 Units into the skin every evening. (Patient taking differently: Inject 25 Units into the skin every evening.)    carBAMazepine (TEGRETOL) 200 mg tablet Take 400 mg by mouth 2 (two) times daily.    furosemide (LASIX) 20 MG tablet Take 20 mg by mouth once daily.    lisinopriL (PRINIVIL,ZESTRIL) 20 MG tablet Take 20 tablets by  "mouth once daily.    ondansetron (ZOFRAN) 4 MG tablet Take 1 tablet (4 mg total) by mouth every 6 (six) hours.    ondansetron (ZOFRAN-ODT) 4 MG TbDL Take 1 tablet (4 mg total) by mouth every 6 (six) hours as needed (nausea and vomiting).    promethazine (PHENERGAN) 25 MG tablet Take 1 tablet (25 mg total) by mouth every 6 (six) hours as needed for Nausea.    venlafaxine (EFFEXOR-XR) 75 MG 24 hr capsule Take 1 capsule by mouth once daily.    BD ULTRA-FINE BRUNILDA PEN NEEDLE 32 gauge x 5/32" Ndle Inject 1 Units into the skin 4 (four) times daily.    insulin aspart U-100 (NOVOLOG) 100 unit/mL InPn pen Inject 8 Units into the skin 3 (three) times daily with meals.    ondansetron (ZOFRAN-ODT) 4 MG TbDL Take 1 tablet (4 mg total) by mouth every 8 (eight) hours as needed (nausea).     Antibiotics (From admission, onward)      Start     Stop Route Frequency Ordered    10/12/22 0430  vancomycin in dextrose 5 % 1 gram/250 mL IVPB 1,000 mg        See Hyperspace for full Linked Orders Report.    -- IV Every 12 hours (non-standard times) 10/11/22 1521    10/11/22 1600  cefepime in dextrose 5 % IVPB 2 g         -- IV Every 8 hours (non-standard times) 10/11/22 1457    10/11/22 1556  vancomycin - pharmacy to dose  (vancomycin IVPB)        See Hyperspace for full Linked Orders Report.    -- IV pharmacy to manage frequency 10/11/22 1457          Antifungals (From admission, onward)      None          Antivirals (From admission, onward)      None               There is no immunization history on file for this patient.    Family History    None       Social History     Socioeconomic History    Marital status: Single   Tobacco Use    Smoking status: Never    Smokeless tobacco: Never   Substance and Sexual Activity    Alcohol use: No    Drug use: No     Review of Systems   Constitutional:  Negative for chills, diaphoresis, fatigue and fever.   HENT:  Negative for congestion, ear pain, nosebleeds, rhinorrhea and sore throat.  "   Eyes:  Negative for pain.   Respiratory:  Negative for cough, shortness of breath and wheezing.    Cardiovascular:  Negative for chest pain and leg swelling.   Gastrointestinal:  Negative for abdominal pain, constipation, diarrhea, nausea and vomiting.   Genitourinary:  Negative for dysuria, frequency and urgency.   Musculoskeletal:  Negative for arthralgias, back pain and neck pain.   Neurological:  Negative for weakness, numbness and headaches.   Objective:     Vital Signs (Most Recent):  Temp: 98.5 °F (36.9 °C) (10/12/22 1613)  Pulse: 76 (10/12/22 1613)  Resp: 18 (10/12/22 1613)  BP: 108/67 (10/12/22 1613)  SpO2: 97 % (10/12/22 1613) Vital Signs (24h Range):  Temp:  [97.5 °F (36.4 °C)-98.6 °F (37 °C)] 98.5 °F (36.9 °C)  Pulse:  [70-93] 76  Resp:  [16-20] 18  SpO2:  [95 %-100 %] 97 %  BP: (106-132)/(58-86) 108/67     Weight: 68 kg (149 lb 14.6 oz)  Body mass index is 21.51 kg/m².    Estimated Creatinine Clearance: 71.9 mL/min (based on SCr of 1.3 mg/dL).    Physical Exam  Vitals and nursing note reviewed.   Constitutional:       General: He is not in acute distress.     Appearance: He is well-developed. He is not diaphoretic.   HENT:      Head: Normocephalic and atraumatic.      Mouth/Throat:      Comments: Poor dentition  Eyes:      Pupils: Pupils are equal, round, and reactive to light.   Cardiovascular:      Rate and Rhythm: Normal rate and regular rhythm.      Heart sounds: Normal heart sounds. No murmur heard.    No friction rub. No gallop.   Pulmonary:      Effort: Pulmonary effort is normal. No respiratory distress.      Breath sounds: Normal breath sounds. No wheezing or rales.   Chest:      Chest wall: No tenderness.   Abdominal:      General: Bowel sounds are normal. There is no distension.      Palpations: There is no mass.      Tenderness: There is no abdominal tenderness. There is no guarding.   Musculoskeletal:         General: No deformity. Normal range of motion.      Cervical back: Normal range  of motion and neck supple.      Comments: Left shoulder in brace     Skin:     General: Skin is warm and dry.      Findings: No erythema or rash.      Comments: Abrasions of lower extremity   Neurological:      Mental Status: He is alert and oriented to person, place, and time.   Psychiatric:         Behavior: Behavior normal.         Thought Content: Thought content normal.         Judgment: Judgment normal.       Significant Labs: All pertinent labs within the past 24 hours have been reviewed.    Significant Imaging: I have reviewed all pertinent imaging results/findings within the past 24 hours.

## 2022-10-12 NOTE — PLAN OF CARE
10/12/22 0815   Post-Acute Status   Post-Acute Authorization Placement   Post-Acute Placement Status Referrals Sent     SW faxed referral to [facility] via [facility] for review. SW will follow up as needed.    10:32 AM  [facility]- Accepted, pending final recommendations.    Samira Blake LMSW  Case Management   [facility]   Ext. [phone]

## 2022-10-12 NOTE — PROGRESS NOTES
Nutrition-Related Diabetes Education      Time Spent:20    Learners: Pavel    Current HbA1c:10.7      Is patient aware of their A1c and their goal A1c?    Yes, states his A1c has been as low as 6 in the past. It's recently elevated from the 9's to 10's.       Home diabetes medication(s): insulin (basaglar), insulin (novalog)      Nutrition Education with handouts: Carbohydrates for people with diabetes       Comments: Pt endorses good appetite, 75% intake of meals. Denies N/V/D/C (LBM 10/120. Ubw 140#, endorses recent wt gain.   *Discussed what carbohydrates are, appropriate portion sizes, protein and fiber's affects on BG. Pt seemed motivated and interested in information.    Typical meal intake at home per pt:  Breakfast: Cheese grits, scrambled eggs, sausage w/ chocolate milk  Lunch/Dinner: Pork & beans, red beans, broccoli, drinks water, SF maria m aid, ginger ale (usually diet), sometimes subway.       Barriers to Learning: Food is cooked by caregivers who may not have previous nutrition education.      Follow up: 10/19/22    Please consult as needed.  Thank you!

## 2022-10-12 NOTE — ASSESSMENT & PLAN NOTE
Patient's FSGs are uncontrolled due to hyperglycemia on current medication regimen.  Last A1c reviewed-   Lab Results   Component Value Date    HGBA1C 10.7 (H) 10/12/2022     Will start on low-dose insulin sliding scale,  Most recent fingerstick glucose reviewed-   Recent Labs   Lab 10/12/22  0541 10/12/22  0825 10/12/22  1147 10/12/22  1607   POCTGLUCOSE 407* 391* 289* 210*       Hold Oral hypoglycemics while patient is in the hospital.  -insulin adjustment prior to discharge  -consider endocrinology consult for appropriate management  -consider nutritional consult with care taker present for optimum diet control.

## 2022-10-12 NOTE — SUBJECTIVE & OBJECTIVE
Interval History: NAEON. See hospital course.      Review of Systems   Constitutional:  Negative for chills, diaphoresis, fatigue and fever.   HENT:  Negative for congestion, ear pain, nosebleeds, rhinorrhea and sore throat.    Eyes:  Negative for pain.   Respiratory:  Negative for cough, shortness of breath and wheezing.    Cardiovascular:  Negative for chest pain and leg swelling.   Gastrointestinal:  Negative for abdominal pain, constipation, diarrhea, nausea and vomiting.   Genitourinary:  Negative for dysuria, frequency and urgency.   Musculoskeletal:  Negative for arthralgias, back pain and neck pain.   Neurological:  Negative for weakness, numbness and headaches.   Objective:     Vital Signs (Most Recent):  Temp: 98.5 °F (36.9 °C) (10/12/22 1613)  Pulse: 76 (10/12/22 1613)  Resp: 18 (10/12/22 1613)  BP: 108/67 (10/12/22 1613)  SpO2: 97 % (10/12/22 1613) Vital Signs (24h Range):  Temp:  [97.5 °F (36.4 °C)-98.6 °F (37 °C)] 98.5 °F (36.9 °C)  Pulse:  [70-93] 76  Resp:  [16-20] 18  SpO2:  [96 %-100 %] 97 %  BP: (106-123)/(58-69) 108/67     Weight: 68 kg (149 lb 14.6 oz)  Body mass index is 21.51 kg/m².    Intake/Output Summary (Last 24 hours) at 10/12/2022 1745  Last data filed at 10/12/2022 1230  Gross per 24 hour   Intake 440 ml   Output 1325 ml   Net -885 ml      Physical Exam  Vitals and nursing note reviewed.   Constitutional:       General: He is not in acute distress.     Appearance: He is well-developed. He is not diaphoretic.   HENT:      Head: Normocephalic and atraumatic.      Mouth/Throat:      Comments: Poor dentition  Eyes:      Pupils: Pupils are equal, round, and reactive to light.   Cardiovascular:      Rate and Rhythm: Normal rate and regular rhythm.      Heart sounds: Normal heart sounds. No murmur heard.    No friction rub. No gallop.   Pulmonary:      Effort: Pulmonary effort is normal. No respiratory distress.      Breath sounds: Normal breath sounds. No wheezing or rales.   Chest:       Chest wall: No tenderness.   Abdominal:      General: Bowel sounds are normal. There is no distension.      Palpations: There is no mass.      Tenderness: There is no abdominal tenderness. There is no guarding.   Musculoskeletal:         General: No deformity. Normal range of motion.      Cervical back: Normal range of motion and neck supple.      Comments: Left shoulder in brace     Skin:     General: Skin is warm and dry.      Findings: No erythema or rash.      Comments: Abrasions of lower extremity   Neurological:      Mental Status: He is alert and oriented to person, place, and time.   Psychiatric:         Behavior: Behavior normal.         Thought Content: Thought content normal.         Judgment: Judgment normal.       Significant Labs: All pertinent labs within the past 24 hours have been reviewed.  Blood Culture:   Recent Labs   Lab 10/11/22  1040 10/11/22  1045   LABBLOO No Growth to date  No Growth to date No Growth to date  No Growth to date     CBC:   Recent Labs   Lab 10/10/22  2143 10/11/22  0443 10/12/22  0537   WBC 8.50 7.15 12.34   HGB 10.4* 10.2* 9.8*   HCT 35.4* 33.8* 32.4*    366 381     CMP:   Recent Labs   Lab 10/10/22  2143 10/11/22  0443 10/12/22  0537   * 131* 130*   K 4.5 4.7 4.9   CL 99 99 98   CO2 26 22* 25   * 282* 402*   BUN 19 20 23*   CREATININE 1.4 1.4 1.3   CALCIUM 8.9 9.3 8.7   PROT 8.3 8.3 7.4   ALBUMIN 2.3* 2.3* 2.1*   BILITOT 0.2 0.2 0.1   ALKPHOS 115 107 96   AST 18 21 21   ALT 20 20 20   ANIONGAP 7* 10 7*     Magnesium:   Recent Labs   Lab 10/11/22  0443   MG 1.6       Significant Imaging: I have reviewed all pertinent imaging results/findings within the past 24 hours.

## 2022-10-12 NOTE — SUBJECTIVE & OBJECTIVE
"Principal Problem:Septic arthritis of shoulder, left    Principal Orthopedic Problem: Same    Interval History: NAEON, patient stable, pain controlled. No acute complaints this morning.   Doing well with PT, Dressings CDI, sling in place     Review of patient's allergies indicates:   Allergen Reactions    Vitamin c, e, zinc, copper combination no.11 Hives       Current Facility-Administered Medications   Medication    acetaminophen tablet 1,000 mg    aluminum-magnesium hydroxide-simethicone 200-200-20 mg/5 mL suspension 30 mL    atorvastatin tablet 80 mg    carBAMazepine tablet 400 mg    cefepime in dextrose 5 % IVPB 2 g    dextrose 10% bolus 125 mL    dextrose 10% bolus 250 mL    enoxaparin injection 40 mg    furosemide tablet 20 mg    gabapentin capsule 300 mg    glucagon (human recombinant) injection 1 mg    glucose chewable tablet 16 g    glucose chewable tablet 24 g    HYDROmorphone injection 1 mg    insulin aspart U-100 pen 1-10 Units    insulin detemir U-100 pen 18 Units    lisinopriL tablet 20 mg    methocarbamoL tablet 500 mg    morphine injection 2 mg    naloxone 0.4 mg/mL injection 0.02 mg    ondansetron tablet 4 mg    prochlorperazine injection Soln 5 mg    sodium chloride 0.9% flush 10 mL    sodium chloride 0.9% flush 10 mL    vancomycin - pharmacy to dose    vancomycin in dextrose 5 % 1 gram/250 mL IVPB 1,000 mg    venlafaxine 24 hr capsule 75 mg     Objective:     Vital Signs (Most Recent):  Temp: 98.5 °F (36.9 °C) (10/12/22 1613)  Pulse: 76 (10/12/22 1613)  Resp: 18 (10/12/22 1613)  BP: 108/67 (10/12/22 1613)  SpO2: 97 % (10/12/22 1613) Vital Signs (24h Range):  Temp:  [97.5 °F (36.4 °C)-98.6 °F (37 °C)] 98.5 °F (36.9 °C)  Pulse:  [70-93] 76  Resp:  [13-20] 18  SpO2:  [95 %-100 %] 97 %  BP: (106-132)/(57-86) 108/67     Weight: 68 kg (149 lb 14.6 oz)  Height: 5' 10" (177.8 cm)  Body mass index is 21.51 kg/m².      Intake/Output Summary (Last 24 hours) at 10/12/2022 1622  Last data filed at 10/12/2022 " 1230  Gross per 24 hour   Intake 740 ml   Output 1325 ml   Net -585 ml       Ortho/SPM Exam    LUE:  Dressings CDI, sling in place   Motor intact wrist flexion/extension, finger flexion/extension, interossei  Sensation intact axillary, radial, median, ulnar nerves  Palp rad pulse, BCR      Significant Labs: CBC:   Recent Labs   Lab 10/10/22  2143 10/11/22  0443 10/12/22  0537   WBC 8.50 7.15 12.34   HGB 10.4* 10.2* 9.8*   HCT 35.4* 33.8* 32.4*    366 381     CMP:   Recent Labs   Lab 10/10/22  2143 10/11/22  0443 10/12/22  0537   * 131* 130*   K 4.5 4.7 4.9   CL 99 99 98   CO2 26 22* 25   * 282* 402*   BUN 19 20 23*   CREATININE 1.4 1.4 1.3   CALCIUM 8.9 9.3 8.7   PROT 8.3 8.3 7.4   ALBUMIN 2.3* 2.3* 2.1*   BILITOT 0.2 0.2 0.1   ALKPHOS 115 107 96   AST 18 21 21   ALT 20 20 20   ANIONGAP 7* 10 7*     All pertinent labs within the past 24 hours have been reviewed.    Significant Imaging: I have reviewed and interpreted all pertinent imaging results/findings.

## 2022-10-12 NOTE — PLAN OF CARE
Patient AAOx4.VS stable, afrebrile. Pain managed. Neurovascular intact. Free from falls. Frequent rounds made for safety, pain and comfort. Bed at lowest position, call light within reach, side rails up x2.       Problem: Adult Inpatient Plan of Care  Goal: Plan of Care Review  Outcome: Ongoing, Progressing  Goal: Patient-Specific Goal (Individualized)  Outcome: Ongoing, Progressing     Problem: Adult Inpatient Plan of Care  Goal: Patient-Specific Goal (Individualized)  Outcome: Ongoing, Progressing     Problem: Diabetes Comorbidity  Goal: Blood Glucose Level Within Targeted Range  Outcome: Ongoing, Progressing     Problem: Impaired Wound Healing  Goal: Optimal Wound Healing  Outcome: Ongoing, Progressing

## 2022-10-12 NOTE — PLAN OF CARE
[facility]  Initial Discharge Assessment       Primary Care Provider: Ingrid Madrigal MD    Admission Diagnosis: Left shoulder pain [M25.512]  Chest pain [R07.9]  Anterior dislocation of left shoulder, initial encounter [S43.015A]  Pyogenic arthritis of left shoulder region, due to unspecified organism [M00.9]    Admission Date: 10/10/2022  Expected Discharge Date: 10/14/2022         Payor: MEDICARE / Plan: MEDICARE PART A & B / Product Type: Government /     Extended Emergency Contact Information  Primary Emergency Contact: Grace Nolasco  Home Phone: [phone]  Relation: Other  Secondary Emergency Contact: Malorie Field  Address: [address] [place] [address]  Home Phone: [phone]  Mobile Phone: [phone]  Relation: Mother    Discharge Plan A: Home, Home Health  Discharge Plan B: Home, Home Health      [id number] - [place], [place] - [address]  [address] [facility] [address]  Phone: [phone] Fax: [phone]    [id number] - [place], [place] - [address] [facility] [address]  [address] [facility] [address]  Phone: [phone] Fax: [phone]      Initial Assessment (most recent)       Adult Discharge Assessment - 10/12/22 1113          Discharge Assessment    Assessment Type Discharge Planning Assessment     Confirmed/corrected address, phone number and insurance Yes     Confirmed Demographics Correct on Facesheet     Source of Information patient     Does patient/caregiver understand observation status Yes     Communicated MILADIS with patient/caregiver Yes     Lives With alone     Do you expect to return to your current living situation? Yes     Do you have help at home or someone to help you manage your care at home? Yes     Who are your caregiver(s) and their phone number(s)? Grace Nolasco ([facility] caregiver)  [phone]     Prior to hospitilization cognitive status: Alert/Oriented     Current cognitive status: Alert/Oriented     Walking or Climbing Stairs Difficulty none     Dressing/Bathing Difficulty none     Home Layout Able to live on 1st floor     Equipment Currently Used at Home none     Readmission within 30 days? No     Patient currently being followed by outpatient case management? No     Do you currently have service(s) that help you manage your care at home? Yes     How Many hours does patient receive services 24     Name and Contact number of agency [employer]     Is the pt/caregiver preference to resume services with current agency Yes     Do you take prescription medications? Yes     Do you have prescription coverage? Yes     Do you have any problems affording any of your prescribed medications? No     Is the patient taking medications as prescribed? yes     Who is going to help you get home at discharge? Caregivers and HH/Home Infusion     How do you get to doctors appointments? family or friend will provide     Are you on dialysis? No     Do you take coumadin? No     Discharge Plan A Home;Home Health     Discharge Plan B Home;Home Health                      Spoke with patient at bedside to complete d/c planning assessment. Patient lives alone in a first floor apartment with 24 hour caregivers provided by the [employer]. Caregivers assist with cooking, cleaning, transportation and medication administration. Patient is Independent and has no DME in home. Anticipate 6 weeks of IVAB pending ID recs per ortho team. [facility] and HH consulted for post-acute care. Spoke with patient's caregiver Lisa Nolasco @ [phone] who reports that caregivers will be able to assist with IVAB at home and will be available for teaching on the day of discharge. Patient's VOA facilitator Megan Anderson @ [phone] will be notified by Lisa of patient's d/c plan. Will continue to  follow for needs.

## 2022-10-12 NOTE — ASSESSMENT & PLAN NOTE
- Patient with radiographic evidence of septic arthritis, osteomyelitis.  Underwent successful I&D w washout 10/11.  -appreciate Orthopedics assistants  -resume diabetic diet after procedure  -IV Vanc and cefepime started pending wound Cx  -ID following  -blood cultures ordered

## 2022-10-12 NOTE — ASSESSMENT & PLAN NOTE
43 y/o male with poorly controlled DM, (HgbA1c 11%), HTN, HLD, seizures, developmental delay presents with left shoulder pain x 2 weeks. ID consulted for septic arthritis and osteomyelitis of left shoulder. S/p I&D with orthopedics 10/11. ID consulted for abx recommendations.     Recommendations  1. Continue vancomycin and cefepime at this time  2. Follow up aspiration culture data. No surgical cultures sent  3. Anticipate long term IV abx therapy  4. ID will follow closely with you

## 2022-10-13 LAB
ALBUMIN SERPL BCP-MCNC: 2.1 G/DL (ref 3.5–5.2)
ALP SERPL-CCNC: 112 U/L (ref 55–135)
ALT SERPL W/O P-5'-P-CCNC: 19 U/L (ref 10–44)
ANION GAP SERPL CALC-SCNC: 6 MMOL/L (ref 8–16)
AST SERPL-CCNC: 18 U/L (ref 10–40)
BASOPHILS # BLD AUTO: 0.02 K/UL (ref 0–0.2)
BASOPHILS NFR BLD: 0.2 % (ref 0–1.9)
BILIRUB SERPL-MCNC: 0.1 MG/DL (ref 0.1–1)
BUN SERPL-MCNC: 26 MG/DL (ref 6–20)
CALCIUM SERPL-MCNC: 8.9 MG/DL (ref 8.7–10.5)
CHLORIDE SERPL-SCNC: 100 MMOL/L (ref 95–110)
CO2 SERPL-SCNC: 29 MMOL/L (ref 23–29)
CREAT SERPL-MCNC: 1.3 MG/DL (ref 0.5–1.4)
DIFFERENTIAL METHOD: ABNORMAL
EOSINOPHIL # BLD AUTO: 0 K/UL (ref 0–0.5)
EOSINOPHIL NFR BLD: 0.4 % (ref 0–8)
ERYTHROCYTE [DISTWIDTH] IN BLOOD BY AUTOMATED COUNT: 13.2 % (ref 11.5–14.5)
EST. GFR  (NO RACE VARIABLE): >60 ML/MIN/1.73 M^2
GLUCOSE SERPL-MCNC: 385 MG/DL (ref 70–110)
HCT VFR BLD AUTO: 31.9 % (ref 40–54)
HGB BLD-MCNC: 9.5 G/DL (ref 14–18)
IMM GRANULOCYTES # BLD AUTO: 0.02 K/UL (ref 0–0.04)
IMM GRANULOCYTES NFR BLD AUTO: 0.2 % (ref 0–0.5)
LYMPHOCYTES # BLD AUTO: 1 K/UL (ref 1–4.8)
LYMPHOCYTES NFR BLD: 12.3 % (ref 18–48)
MCH RBC QN AUTO: 25.5 PG (ref 27–31)
MCHC RBC AUTO-ENTMCNC: 29.8 G/DL (ref 32–36)
MCV RBC AUTO: 86 FL (ref 82–98)
MONOCYTES # BLD AUTO: 0.8 K/UL (ref 0.3–1)
MONOCYTES NFR BLD: 9.6 % (ref 4–15)
NEUTROPHILS # BLD AUTO: 6.3 K/UL (ref 1.8–7.7)
NEUTROPHILS NFR BLD: 77.3 % (ref 38–73)
NRBC BLD-RTO: 0 /100 WBC
PLATELET # BLD AUTO: 384 K/UL (ref 150–450)
PMV BLD AUTO: 10.1 FL (ref 9.2–12.9)
POCT GLUCOSE: 157 MG/DL (ref 70–110)
POCT GLUCOSE: 173 MG/DL (ref 70–110)
POCT GLUCOSE: 223 MG/DL (ref 70–110)
POCT GLUCOSE: 312 MG/DL (ref 70–110)
POCT GLUCOSE: 344 MG/DL (ref 70–110)
POTASSIUM SERPL-SCNC: 4.4 MMOL/L (ref 3.5–5.1)
PROT SERPL-MCNC: 7.4 G/DL (ref 6–8.4)
RBC # BLD AUTO: 3.72 M/UL (ref 4.6–6.2)
SODIUM SERPL-SCNC: 135 MMOL/L (ref 136–145)
VANCOMYCIN TROUGH SERPL-MCNC: 32.2 UG/ML (ref 10–22)
WBC # BLD AUTO: 8.12 K/UL (ref 3.9–12.7)

## 2022-10-13 PROCEDURE — 99233 PR SUBSEQUENT HOSPITAL CARE,LEVL III: ICD-10-PCS | Mod: ,,, | Performed by: PHYSICIAN ASSISTANT

## 2022-10-13 PROCEDURE — 63600175 PHARM REV CODE 636 W HCPCS: Performed by: INTERNAL MEDICINE

## 2022-10-13 PROCEDURE — 99233 SBSQ HOSP IP/OBS HIGH 50: CPT | Mod: ,,, | Performed by: PHYSICIAN ASSISTANT

## 2022-10-13 PROCEDURE — 76937 US GUIDE VASCULAR ACCESS: CPT

## 2022-10-13 PROCEDURE — 85025 COMPLETE CBC W/AUTO DIFF WBC: CPT

## 2022-10-13 PROCEDURE — 63600175 PHARM REV CODE 636 W HCPCS: Performed by: PHYSICIAN ASSISTANT

## 2022-10-13 PROCEDURE — 80202 ASSAY OF VANCOMYCIN: CPT | Performed by: INTERNAL MEDICINE

## 2022-10-13 PROCEDURE — 36573 INSJ PICC RS&I 5 YR+: CPT

## 2022-10-13 PROCEDURE — 25000003 PHARM REV CODE 250: Performed by: INTERNAL MEDICINE

## 2022-10-13 PROCEDURE — 11000001 HC ACUTE MED/SURG PRIVATE ROOM

## 2022-10-13 PROCEDURE — 63600175 PHARM REV CODE 636 W HCPCS

## 2022-10-13 PROCEDURE — 80053 COMPREHEN METABOLIC PANEL: CPT

## 2022-10-13 PROCEDURE — 25000003 PHARM REV CODE 250

## 2022-10-13 PROCEDURE — 63600175 PHARM REV CODE 636 W HCPCS: Performed by: STUDENT IN AN ORGANIZED HEALTH CARE EDUCATION/TRAINING PROGRAM

## 2022-10-13 PROCEDURE — 99232 PR SUBSEQUENT HOSPITAL CARE,LEVL II: ICD-10-PCS | Mod: GC,,, | Performed by: HOSPITALIST

## 2022-10-13 PROCEDURE — 36415 COLL VENOUS BLD VENIPUNCTURE: CPT | Performed by: INTERNAL MEDICINE

## 2022-10-13 PROCEDURE — 99232 SBSQ HOSP IP/OBS MODERATE 35: CPT | Mod: GC,,, | Performed by: HOSPITALIST

## 2022-10-13 PROCEDURE — C1751 CATH, INF, PER/CENT/MIDLINE: HCPCS

## 2022-10-13 RX ORDER — SODIUM CHLORIDE 0.9 % (FLUSH) 0.9 %
10 SYRINGE (ML) INJECTION EVERY 6 HOURS
Status: DISCONTINUED | OUTPATIENT
Start: 2022-10-14 | End: 2022-10-14 | Stop reason: HOSPADM

## 2022-10-13 RX ORDER — SODIUM CHLORIDE 0.9 % (FLUSH) 0.9 %
10 SYRINGE (ML) INJECTION
Status: DISCONTINUED | OUTPATIENT
Start: 2022-10-13 | End: 2022-10-14 | Stop reason: HOSPADM

## 2022-10-13 RX ORDER — INSULIN ASPART 100 [IU]/ML
10 INJECTION, SOLUTION INTRAVENOUS; SUBCUTANEOUS
Status: DISCONTINUED | OUTPATIENT
Start: 2022-10-13 | End: 2022-10-14 | Stop reason: HOSPADM

## 2022-10-13 RX ADMIN — VANCOMYCIN HYDROCHLORIDE 1000 MG: 1 INJECTION, POWDER, LYOPHILIZED, FOR SOLUTION INTRAVENOUS at 06:10

## 2022-10-13 RX ADMIN — ACETAMINOPHEN 1000 MG: 500 TABLET ORAL at 09:10

## 2022-10-13 RX ADMIN — GABAPENTIN 300 MG: 300 CAPSULE ORAL at 02:10

## 2022-10-13 RX ADMIN — METHOCARBAMOL 500 MG: 500 TABLET ORAL at 08:10

## 2022-10-13 RX ADMIN — VENLAFAXINE HYDROCHLORIDE 75 MG: 75 CAPSULE, EXTENDED RELEASE ORAL at 08:10

## 2022-10-13 RX ADMIN — CEFEPIME 2 G: 2 INJECTION, POWDER, FOR SOLUTION INTRAVENOUS at 10:10

## 2022-10-13 RX ADMIN — METHOCARBAMOL 500 MG: 500 TABLET ORAL at 04:10

## 2022-10-13 RX ADMIN — ACETAMINOPHEN 1000 MG: 500 TABLET ORAL at 02:10

## 2022-10-13 RX ADMIN — INSULIN ASPART 10 UNITS: 100 INJECTION, SOLUTION INTRAVENOUS; SUBCUTANEOUS at 04:10

## 2022-10-13 RX ADMIN — CARBAMAZEPINE 400 MG: 200 TABLET ORAL at 08:10

## 2022-10-13 RX ADMIN — CEFEPIME 2 G: 2 INJECTION, POWDER, FOR SOLUTION INTRAVENOUS at 03:10

## 2022-10-13 RX ADMIN — LISINOPRIL 20 MG: 20 TABLET ORAL at 08:10

## 2022-10-13 RX ADMIN — CEFTRIAXONE 2 G: 2 INJECTION, POWDER, FOR SOLUTION INTRAMUSCULAR; INTRAVENOUS at 04:10

## 2022-10-13 RX ADMIN — INSULIN ASPART 10 UNITS: 100 INJECTION, SOLUTION INTRAVENOUS; SUBCUTANEOUS at 10:10

## 2022-10-13 RX ADMIN — ENOXAPARIN SODIUM 40 MG: 100 INJECTION SUBCUTANEOUS at 04:10

## 2022-10-13 RX ADMIN — INSULIN ASPART 8 UNITS: 100 INJECTION, SOLUTION INTRAVENOUS; SUBCUTANEOUS at 08:10

## 2022-10-13 RX ADMIN — FUROSEMIDE 20 MG: 20 TABLET ORAL at 08:10

## 2022-10-13 RX ADMIN — GABAPENTIN 300 MG: 300 CAPSULE ORAL at 08:10

## 2022-10-13 RX ADMIN — METHOCARBAMOL 500 MG: 500 TABLET ORAL at 12:10

## 2022-10-13 RX ADMIN — ATORVASTATIN CALCIUM 80 MG: 40 TABLET, FILM COATED ORAL at 08:10

## 2022-10-13 RX ADMIN — INSULIN ASPART 2 UNITS: 100 INJECTION, SOLUTION INTRAVENOUS; SUBCUTANEOUS at 04:10

## 2022-10-13 RX ADMIN — INSULIN ASPART 4 UNITS: 100 INJECTION, SOLUTION INTRAVENOUS; SUBCUTANEOUS at 11:10

## 2022-10-13 NOTE — SUBJECTIVE & OBJECTIVE
Interval History: NAEON. See hospital course.      Review of Systems   Constitutional:  Negative for chills, diaphoresis, fatigue and fever.   HENT:  Negative for congestion, ear pain, nosebleeds, rhinorrhea and sore throat.    Eyes:  Negative for pain.   Respiratory:  Negative for cough, shortness of breath and wheezing.    Cardiovascular:  Negative for chest pain and leg swelling.   Gastrointestinal:  Negative for abdominal pain, constipation, diarrhea, nausea and vomiting.   Genitourinary:  Negative for dysuria, frequency and urgency.   Musculoskeletal:  Negative for arthralgias, back pain and neck pain.   Neurological:  Negative for weakness, numbness and headaches.     Objective:     Vital Signs (Most Recent):  Temp: 98.2 °F (36.8 °C) (10/13/22 1125)  Pulse: 71 (10/13/22 1125)  Resp: 16 (10/13/22 1125)  BP: (!) 142/83 (10/13/22 1125)  SpO2: 99 % (10/13/22 1125) Vital Signs (24h Range):  Temp:  [97.4 °F (36.3 °C)-98.6 °F (37 °C)] 98.2 °F (36.8 °C)  Pulse:  [69-77] 71  Resp:  [15-18] 16  SpO2:  [97 %-100 %] 99 %  BP: (108-142)/(67-88) 142/83     Weight: 68 kg (149 lb 14.6 oz)  Body mass index is 21.51 kg/m².    Intake/Output Summary (Last 24 hours) at 10/13/2022 1406  Last data filed at 10/13/2022 0820  Gross per 24 hour   Intake 760 ml   Output 1200 ml   Net -440 ml        Physical Exam  Vitals and nursing note reviewed.   Constitutional:       General: He is not in acute distress.     Appearance: He is well-developed. He is not diaphoretic.   HENT:      Head: Normocephalic and atraumatic.      Mouth/Throat:      Comments: Poor dentition  Eyes:      Pupils: Pupils are equal, round, and reactive to light.   Cardiovascular:      Rate and Rhythm: Normal rate and regular rhythm.      Heart sounds: Normal heart sounds. No murmur heard.    No friction rub. No gallop.   Pulmonary:      Effort: Pulmonary effort is normal. No respiratory distress.      Breath sounds: Normal breath sounds. No wheezing or rales.   Chest:       Chest wall: No tenderness.   Abdominal:      General: Bowel sounds are normal. There is no distension.      Palpations: There is no mass.      Tenderness: There is no abdominal tenderness. There is no guarding.   Musculoskeletal:         General: No deformity. Normal range of motion.      Cervical back: Normal range of motion and neck supple.      Comments: Left shoulder dressing place; c/d/i     Skin:     General: Skin is warm and dry.      Findings: No erythema or rash.      Comments: Abrasions of lower extremity   Neurological:      Mental Status: He is alert and oriented to person, place, and time.   Psychiatric:         Behavior: Behavior normal.         Thought Content: Thought content normal.         Judgment: Judgment normal.       Significant Labs: All pertinent labs within the past 24 hours have been reviewed.  Blood Culture:   No results for input(s): LABBLOO in the last 48 hours.    CBC:   Recent Labs   Lab 10/12/22  0537 10/13/22  0311   WBC 12.34 8.12   HGB 9.8* 9.5*   HCT 32.4* 31.9*    384       CMP:   Recent Labs   Lab 10/12/22  0537 10/13/22  0311   * 135*   K 4.9 4.4   CL 98 100   CO2 25 29   * 385*   BUN 23* 26*   CREATININE 1.3 1.3   CALCIUM 8.7 8.9   PROT 7.4 7.4   ALBUMIN 2.1* 2.1*   BILITOT 0.1 0.1   ALKPHOS 96 112   AST 21 18   ALT 20 19   ANIONGAP 7* 6*       Magnesium:   No results for input(s): MG in the last 48 hours.      Significant Imaging: I have reviewed all pertinent imaging results/findings within the past 24 hours.

## 2022-10-13 NOTE — PLAN OF CARE
Pt resting in bed comfortably. Sling in place to LUE. PIV line intact and free of infection and irritation. Fall precautions maintained, no falls noted. Call light within reach, bed locked and in lowest position. Non-skid socks on while out of bed. Patient instructed to call for assistance. Skin integrity maintained as patient is independent with frequent repositioning. No complaints of pain, tolerating prescribed diet. Progressing towards goals. Will continue to monitor and follow plan of care.

## 2022-10-13 NOTE — ASSESSMENT & PLAN NOTE
43 y/o male with poorly controlled DM, (HgbA1c 11%), HTN, HLD, seizures, developmental delay presents with left shoulder pain x 2 weeks. ID consulted for septic arthritis and osteomyelitis of left shoulder. S/p I&D with orthopedics 10/11. ID consulted for abx recommendations.     Recommendations  1. Continue vancomycin. Trough supratherapeutic at 32. redose accordingly. Pharmacy following.   2. De escalate to ceftriaxone  2. Follow culture data   3. Anticipate 6 weeks of IV abx therapy for osteomyelitis   4. ID will follow closely with you

## 2022-10-13 NOTE — PROGRESS NOTES
[facility]  Orthopedics  Progress Note    Patient Name: Pavel iFeld  MRN: [id number]  Admission Date: 10/10/2022  Hospital Length of Stay: 2 days  Attending Provider: Marisa Mina MD  Primary Care Provider: Ingrid Madrigal MD  Follow-up For: Procedure(s) (LRB):  DEBRIDEMENT, SHOULDER, ARTHROSCOPIC  (Left)  SYNOVECTOMY, SHOULDER (Left)    Post-Operative Day: 2 Days Post-Op  Subjective:     Principal Problem:Septic arthritis of shoulder, left    Principal Orthopedic Problem: same     Interval History: NAEON, patient stable, pain controlled. No acute complaints this morning. Reports feeling better.   Did well with PT, Dressings CDI,.     Review of patient's allergies indicates:   Allergen Reactions    Vitamin c, e, zinc, copper combination no.11 Hives       Current Facility-Administered Medications   Medication    acetaminophen tablet 1,000 mg    aluminum-magnesium hydroxide-simethicone 200-200-20 mg/5 mL suspension 30 mL    atorvastatin tablet 80 mg    carBAMazepine tablet 400 mg    cefepime in dextrose 5 % IVPB 2 g    dextrose 10% bolus 125 mL    dextrose 10% bolus 250 mL    enoxaparin injection 40 mg    furosemide tablet 20 mg    gabapentin capsule 300 mg    glucagon (human recombinant) injection 1 mg    glucose chewable tablet 16 g    glucose chewable tablet 24 g    HYDROmorphone injection 1 mg    insulin aspart U-100 pen 1-10 Units    insulin detemir U-100 pen 18 Units    lisinopriL tablet 20 mg    methocarbamoL tablet 500 mg    morphine injection 2 mg    naloxone 0.4 mg/mL injection 0.02 mg    ondansetron tablet 4 mg    prochlorperazine injection Soln 5 mg    sodium chloride 0.9% flush 10 mL    sodium chloride 0.9% flush 10 mL    vancomycin - pharmacy to dose    vancomycin in dextrose 5 % 1 gram/250 mL IVPB 1,000 mg    venlafaxine 24 hr capsule 75 mg     Objective:     Vital Signs (Most Recent):  Temp: 98.3 °F (36.8 °C) (10/13/22 0444)  Pulse: 70 (10/13/22 0444)  Resp: 18 (10/13/22 0444)  BP:  "130/83 (10/13/22 0444)  SpO2: 99 % (10/13/22 0444) Vital Signs (24h Range):  Temp:  [97.6 °F (36.4 °C)-98.6 °F (37 °C)] 98.3 °F (36.8 °C)  Pulse:  [70-93] 70  Resp:  [16-18] 18  SpO2:  [96 %-100 %] 99 %  BP: (108-140)/(58-88) 130/83     Weight: 68 kg (149 lb 14.6 oz)  Height: 5' 10" (177.8 cm)  Body mass index is 21.51 kg/m².      Intake/Output Summary (Last 24 hours) at 10/13/2022 0627  Last data filed at 10/13/2022 0500  Gross per 24 hour   Intake 560 ml   Output 750 ml   Net -190 ml       Ortho/SPM Exam  Dressings CDI  Motor intact wrist flexion/extension, finger flexion/extension,   Sensation intact axillary, radial, median, ulnar nerves  Palp rad pulse, BCR      Significant Labs: CBC:   Recent Labs   Lab 10/12/22  0537 10/13/22  0311   WBC 12.34 8.12   HGB 9.8* 9.5*   HCT 32.4* 31.9*    384     CMP:   Recent Labs   Lab 10/12/22  0537 10/13/22  0311   * 135*   K 4.9 4.4   CL 98 100   CO2 25 29   * 385*   BUN 23* 26*   CREATININE 1.3 1.3   CALCIUM 8.7 8.9   PROT 7.4 7.4   ALBUMIN 2.1* 2.1*   BILITOT 0.1 0.1   ALKPHOS 96 112   AST 21 18   ALT 20 19   ANIONGAP 7* 6*     All pertinent labs within the past 24 hours have been reviewed.    Significant Imaging: I have reviewed all pertinent imaging results/findings.    Assessment/Plan:   * Septic arthritis of shoulder, left  Pavel Field is a 42 y.o. male presenting with left shoulder pain for the past 2 weeks. he does not have a history of gout. , , WBC 8.5, afebrile. Denies any recent fevers or chills. No reported recent prior infections. No recent recent antibiotics.      --status post irrigation and debridement of the left shoulder for septic arthritis on 10/11/2022  --PT/OT daily   --given MRI and surgical findings of likely osteomyelitis of the humeral head he will require extended course of IV antibiotic therapy  --will follow up ID recommendations   --cultures no growth today  --continue IV antibiotics empirically at this " time      Rowena Kingsley PA-C  Orthopedics  [facility]

## 2022-10-13 NOTE — PROGRESS NOTES
[facility]  Infectious Disease  Progress Note    Patient Name: Pavel Field  MRN: [id number]  Admission Date: 10/10/2022  Length of Stay: 2 days  Attending Physician: Monserrat Parks MD  Primary Care Provider: Ingrid Madrigal MD    Isolation Status: No active isolations  Assessment/Plan:      * Septic arthritis of shoulder, left  43 y/o male with poorly controlled DM, (HgbA1c 11%), HTN, HLD, seizures, developmental delay presents with left shoulder pain x 2 weeks. ID consulted for septic arthritis and osteomyelitis of left shoulder. S/p I&D with orthopedics 10/11. ID consulted for abx recommendations.     Recommendations  1. Continue vancomycin. Trough supratherapeutic at 32. redose accordingly. Pharmacy following.   2. De escalate to ceftriaxone  2. Follow culture data   3. Anticipate 6 weeks of IV abx therapy for osteomyelitis   4. ID will follow closely with you         Thank you for your consult. I will follow-up with patient. Please contact us if you have any additional questions.    Jessie Guerrero PA-C  Infectious Disease  [facility]    Subjective:     Principal Problem:Septic arthritis of shoulder, left    HPI: 43 y/o male with poorly controlled DMII, (HgbA1c 11%), HTN, HLD, seizures, developmental delay presents with left shoulder pain x 2 weeks. ID consulted for septic arthritis and osteomyelitis of left shoulder. S/p I&D with orthopedics 10/11. ID consulted for abx recommendations.     Per chart review, History is assisted by the patient's caregiver.  Patient has 24 hour caregivers.  He has been complaining of left shoulder pain for the past 2.5 weeks.  Pain is constant.  Pain is worsened with changes in weather and movement.  No trauma or increased use.  Patient is suspicious that he may have slept on it wrong. Started to happen after he fell in the bath tub.  Prior to this time, he has had no previous shoulder issues.  He has tried taking Tylenol Arthritis and Biofreeze without  resolution of symptoms.  No fevers.  No history of gout.  No history of IVDU.  ESR of 104 CRPP 137 in white blood count of 8.5 shoulder was aspirated with marked increase white blood cell count.  MRI of the shoulder with evidence of synovitis and capsulitis concerning for septic arthritis, osteomyelitis of the humeral head, intramuscular edema involving deltoid and rotator cuff muscles concerning for myositis and so acromial bursitis and biceps tenosynovitis. No evidence of soft tissue abscess    Interval History:   NAEON  Cultures remain negative  Feels well     Review of Systems   Constitutional:  Negative for chills, diaphoresis, fatigue and fever.   HENT:  Negative for congestion, ear pain, nosebleeds, rhinorrhea and sore throat.    Eyes:  Negative for pain.   Respiratory:  Negative for cough, shortness of breath and wheezing.    Cardiovascular:  Negative for chest pain and leg swelling.   Gastrointestinal:  Negative for abdominal pain, constipation, diarrhea, nausea and vomiting.   Genitourinary:  Negative for dysuria, frequency and urgency.   Musculoskeletal:  Negative for arthralgias, back pain and neck pain.   Neurological:  Negative for weakness, numbness and headaches.   Objective:     Vital Signs (Most Recent):  Temp: 98.2 °F (36.8 °C) (10/13/22 1125)  Pulse: 71 (10/13/22 1125)  Resp: 16 (10/13/22 1125)  BP: (!) 142/83 (10/13/22 1125)  SpO2: 99 % (10/13/22 1125)   Vital Signs (24h Range):  Temp:  [97.4 °F (36.3 °C)-98.6 °F (37 °C)] 98.2 °F (36.8 °C)  Pulse:  [69-77] 71  Resp:  [15-18] 16  SpO2:  [97 %-100 %] 99 %  BP: (108-142)/(67-88) 142/83     Weight: 68 kg (149 lb 14.6 oz)  Body mass index is 21.51 kg/m².    Estimated Creatinine Clearance: 71.9 mL/min (based on SCr of 1.3 mg/dL).    Physical Exam  Vitals and nursing note reviewed.   Constitutional:       General: He is not in acute distress.     Appearance: He is well-developed. He is not diaphoretic.   HENT:      Head: Normocephalic and atraumatic.    Eyes:      Pupils: Pupils are equal, round, and reactive to light.   Cardiovascular:      Rate and Rhythm: Normal rate and regular rhythm.      Heart sounds: Normal heart sounds. No murmur heard.    No friction rub. No gallop.   Pulmonary:      Effort: Pulmonary effort is normal. No respiratory distress.      Breath sounds: Normal breath sounds. No wheezing or rales.   Chest:      Chest wall: No tenderness.   Abdominal:      General: Bowel sounds are normal. There is no distension.      Palpations: There is no mass.      Tenderness: There is no abdominal tenderness. There is no guarding.   Musculoskeletal:         General: No deformity. Normal range of motion.      Cervical back: Normal range of motion and neck supple.   Skin:     General: Skin is warm and dry.      Findings: No erythema or rash.   Neurological:      Mental Status: He is alert and oriented to person, place, and time.   Psychiatric:         Behavior: Behavior normal.         Thought Content: Thought content normal.         Judgment: Judgment normal.       Significant Labs: All pertinent labs within the past 24 hours have been reviewed.    Significant Imaging: I have reviewed all pertinent imaging results/findings within the past 24 hours.

## 2022-10-13 NOTE — SUBJECTIVE & OBJECTIVE
Interval History:   NAEON  Cultures remain negative  Feels well     Review of Systems   Constitutional:  Negative for chills, diaphoresis, fatigue and fever.   HENT:  Negative for congestion, ear pain, nosebleeds, rhinorrhea and sore throat.    Eyes:  Negative for pain.   Respiratory:  Negative for cough, shortness of breath and wheezing.    Cardiovascular:  Negative for chest pain and leg swelling.   Gastrointestinal:  Negative for abdominal pain, constipation, diarrhea, nausea and vomiting.   Genitourinary:  Negative for dysuria, frequency and urgency.   Musculoskeletal:  Negative for arthralgias, back pain and neck pain.   Neurological:  Negative for weakness, numbness and headaches.   Objective:     Vital Signs (Most Recent):  Temp: 98.2 °F (36.8 °C) (10/13/22 1125)  Pulse: 71 (10/13/22 1125)  Resp: 16 (10/13/22 1125)  BP: (!) 142/83 (10/13/22 1125)  SpO2: 99 % (10/13/22 1125)   Vital Signs (24h Range):  Temp:  [97.4 °F (36.3 °C)-98.6 °F (37 °C)] 98.2 °F (36.8 °C)  Pulse:  [69-77] 71  Resp:  [15-18] 16  SpO2:  [97 %-100 %] 99 %  BP: (108-142)/(67-88) 142/83     Weight: 68 kg (149 lb 14.6 oz)  Body mass index is 21.51 kg/m².    Estimated Creatinine Clearance: 71.9 mL/min (based on SCr of 1.3 mg/dL).    Physical Exam  Vitals and nursing note reviewed.   Constitutional:       General: He is not in acute distress.     Appearance: He is well-developed. He is not diaphoretic.   HENT:      Head: Normocephalic and atraumatic.   Eyes:      Pupils: Pupils are equal, round, and reactive to light.   Cardiovascular:      Rate and Rhythm: Normal rate and regular rhythm.      Heart sounds: Normal heart sounds. No murmur heard.    No friction rub. No gallop.   Pulmonary:      Effort: Pulmonary effort is normal. No respiratory distress.      Breath sounds: Normal breath sounds. No wheezing or rales.   Chest:      Chest wall: No tenderness.   Abdominal:      General: Bowel sounds are normal. There is no distension.       Palpations: There is no mass.      Tenderness: There is no abdominal tenderness. There is no guarding.   Musculoskeletal:         General: No deformity. Normal range of motion.      Cervical back: Normal range of motion and neck supple.   Skin:     General: Skin is warm and dry.      Findings: No erythema or rash.   Neurological:      Mental Status: He is alert and oriented to person, place, and time.   Psychiatric:         Behavior: Behavior normal.         Thought Content: Thought content normal.         Judgment: Judgment normal.       Significant Labs: All pertinent labs within the past 24 hours have been reviewed.    Significant Imaging: I have reviewed all pertinent imaging results/findings within the past 24 hours.

## 2022-10-13 NOTE — CONSULTS
Double lumen PICC to RIGHT BASILIC vein.  39 cm in length, 27 cm arm circumference and 0 cm exposed.   Lot # [id number].

## 2022-10-13 NOTE — PROGRESS NOTES
[facility] Medicine  Progress Note    Patient Name: Pavel Field  MRN: [id number]  Patient Class: IP- Inpatient   Admission Date: 10/10/2022  Length of Stay: 2 days  Attending Physician: Monserrat Parks MD  Primary Care Provider: Ingrid Madrigal MD        Subjective:     Principal Problem:Septic arthritis of shoulder, left        HPI:  43 yo M with extensive pmhx including anxiety, depression, IDDM, HLD, HTN, seizures, developmental delay presents with a chief complaint of left shoulder pain.  History is assisted by the patient's caregiver.  Patient has 24 hour caregivers.  He has been complaining of left shoulder pain for the past 2.5 weeks.  Pain is constant.  Pain is worsened with changes in weather and movement.  No trauma or increased use.  Patient is suspicious that he may have slept on it wrong.  Prior to this time, he has had no previous shoulder issues.  He has tried taking Tylenol Arthritis and Biofreeze without resolution of symptoms.  No fevers.  No history of gout.  No history of IVDU.        Mr. Field is a 42-year-old male with a past medical history hypertension, seizures, developmental delay, hyperlipidemia, type 1 diabetes mellitus on insulin (concurrently using Dexcom 6), anxiety and depression.  He presents to the emergency department with chief complaint of left shoulder pain, that started 2 weeks ago after falling in the bath tub.  Denies trauma to the head, loss of consciousness or lacerations.  Patient states that pain was manageable at the beginning but progressively got worse limiting his range of motion.  Today pain is described as dull, 2/10 at rest and 7/10 with movement.  Pain is partially alleviated by Tylenol and Biofreeze.  Denies localized joint swelling, erythema, fevers, chills, left arm paresthesias or weakness.    In the ED shoulder xray with no acute osseous abnormality of the left shoulder. Previously described possible left shoulder subluxation or  dislocation is not seen on these radiographic views.  Orthopedics was consulted.  ESR of 104 CRPP 137 in white blood count of 8.5 shoulder was aspirated with marked increase white blood cell count.  MRI of the shoulder with evidence of synovitis and capsulitis concerning for septic arthritis, osteomyelitis of the humeral head, intramuscular edema involving deltoid and rotator cuff muscles concerning for myositis and so acromial bursitis and biceps tenosynovitis. No evidence of soft tissue abscess      Overview/Hospital Course:  Underwent a successful I&D by Ortho on 10/11, however unfortunately no intra-operative culture data.  Empiric Vanc + cefepime started, and ID is following.  Pain was well-controlled following surgery.  His DM meds were largely held prior to surgery but resumed soon thereafter with labile blood glucose; titrating insulin regimen. PT/OT evaluated with recommendations for home health on discharge. Now awaiting culture data for final ID recommendations for abx.      Interval History: NAEON. See hospital course.      Review of Systems   Constitutional:  Negative for chills, diaphoresis, fatigue and fever.   HENT:  Negative for congestion, ear pain, nosebleeds, rhinorrhea and sore throat.    Eyes:  Negative for pain.   Respiratory:  Negative for cough, shortness of breath and wheezing.    Cardiovascular:  Negative for chest pain and leg swelling.   Gastrointestinal:  Negative for abdominal pain, constipation, diarrhea, nausea and vomiting.   Genitourinary:  Negative for dysuria, frequency and urgency.   Musculoskeletal:  Negative for arthralgias, back pain and neck pain.   Neurological:  Negative for weakness, numbness and headaches.     Objective:     Vital Signs (Most Recent):  Temp: 98.2 °F (36.8 °C) (10/13/22 1125)  Pulse: 71 (10/13/22 1125)  Resp: 16 (10/13/22 1125)  BP: (!) 142/83 (10/13/22 1125)  SpO2: 99 % (10/13/22 1125) Vital Signs (24h Range):  Temp:  [97.4 °F (36.3 °C)-98.6 °F (37 °C)]  98.2 °F (36.8 °C)  Pulse:  [69-77] 71  Resp:  [15-18] 16  SpO2:  [97 %-100 %] 99 %  BP: (108-142)/(67-88) 142/83     Weight: 68 kg (149 lb 14.6 oz)  Body mass index is 21.51 kg/m².    Intake/Output Summary (Last 24 hours) at 10/13/2022 1406  Last data filed at 10/13/2022 0820  Gross per 24 hour   Intake 760 ml   Output 1200 ml   Net -440 ml        Physical Exam  Vitals and nursing note reviewed.   Constitutional:       General: He is not in acute distress.     Appearance: He is well-developed. He is not diaphoretic.   HENT:      Head: Normocephalic and atraumatic.      Mouth/Throat:      Comments: Poor dentition  Eyes:      Pupils: Pupils are equal, round, and reactive to light.   Cardiovascular:      Rate and Rhythm: Normal rate and regular rhythm.      Heart sounds: Normal heart sounds. No murmur heard.    No friction rub. No gallop.   Pulmonary:      Effort: Pulmonary effort is normal. No respiratory distress.      Breath sounds: Normal breath sounds. No wheezing or rales.   Chest:      Chest wall: No tenderness.   Abdominal:      General: Bowel sounds are normal. There is no distension.      Palpations: There is no mass.      Tenderness: There is no abdominal tenderness. There is no guarding.   Musculoskeletal:         General: No deformity. Normal range of motion.      Cervical back: Normal range of motion and neck supple.      Comments: Left shoulder dressing place; c/d/i     Skin:     General: Skin is warm and dry.      Findings: No erythema or rash.      Comments: Abrasions of lower extremity   Neurological:      Mental Status: He is alert and oriented to person, place, and time.   Psychiatric:         Behavior: Behavior normal.         Thought Content: Thought content normal.         Judgment: Judgment normal.       Significant Labs: All pertinent labs within the past 24 hours have been reviewed.  Blood Culture:   No results for input(s): LABBLOO in the last 48 hours.    CBC:   Recent Labs   Lab  10/12/22  0537 10/13/22  0311   WBC 12.34 8.12   HGB 9.8* 9.5*   HCT 32.4* 31.9*    384       CMP:   Recent Labs   Lab 10/12/22  0537 10/13/22  0311   * 135*   K 4.9 4.4   CL 98 100   CO2 25 29   * 385*   BUN 23* 26*   CREATININE 1.3 1.3   CALCIUM 8.7 8.9   PROT 7.4 7.4   ALBUMIN 2.1* 2.1*   BILITOT 0.1 0.1   ALKPHOS 96 112   AST 21 18   ALT 20 19   ANIONGAP 7* 6*       Magnesium:   No results for input(s): MG in the last 48 hours.      Significant Imaging: I have reviewed all pertinent imaging results/findings within the past 24 hours.      Assessment/Plan:      * Septic arthritis of shoulder, left  - Patient with radiographic evidence of septic arthritis, osteomyelitis.  Underwent successful I&D w washout 10/11. No intra-op cultures. Will continue empiric treatment.  -appreciate Orthopedics assistantance  -resume diabetic diet after procedure  -IV Vanc and cefepime started - will narrow based on culture data from aspiration, which is so far NGTD but still pending  -ID following  -blood cultures ordered - NGTD      Developmental disability  Unknown specifics about developmental disability, patient lives on his own but has caretaker 24/7, patient is oriented at the time person and place and is able to recall the medication he is currently on.       Type 1 diabetes mellitus with hyperglycemia  Patient's FSGs are uncontrolled due to hyperglycemia on current medication regimen.  Last A1c reviewed-   Lab Results   Component Value Date    HGBA1C 10.7 (H) 10/12/2022     Will start on low-dose insulin sliding scale,  Most recent fingerstick glucose reviewed-   Recent Labs   Lab 10/12/22  2033 10/13/22  0447 10/13/22  0806 10/13/22  1157   POCTGLUCOSE 345* 312* 344* 223*       Hold Oral hypoglycemics while patient is in the hospital.  -insulin adjustment prior to discharge to 36 basal (18 BID) with 10 aspart at meals + SSI  -consider endocrinology consult for appropriate management  -consider nutritional  consult with care taker present for optimum diet control.    Seizure disorder  Patient unable to recall last seizure, currently on carbamazepine without adverse effects although it likely contributes to his reported chronic hyponatremia.    -continue with carbamazepine 200 mg    Essential hypertension  Patient currently on lisinopril 20 mg daily and furosemide, caretaker at bedside did not know his home blood pressures.  In the ED max systolic blood pressure of 160 mmhg, during time frame where patient had joint aspiration and mandibular lesion of his shoulder.  -continue home meds      VTE Risk Mitigation (From admission, onward)         Ordered     enoxaparin injection 40 mg  Daily         10/12/22 1425     IP VTE LOW RISK PATIENT  Once         10/11/22 1016     Place ROSHNI hose  Until discontinued         10/11/22 1016     Place sequential compression device  Until discontinued         10/11/22 1016     Place sequential compression device  Until discontinued         10/11/22 0708                Discharge Planning   MILADIS: 10/13/2022     Code Status: Full Code   Is the patient medically ready for discharge?: Yes    Reason for patient still in hospital (select all that apply): Treatment  Discharge Plan A: Home, Home Health                  Steve Cervantes MD  Department of Hospital Medicine   [facility]

## 2022-10-13 NOTE — ASSESSMENT & PLAN NOTE
Unknown specifics about developmental disability, patient lives on his own but has caretaker 24/7, patient is oriented at the time person and place and is able to recall the medication he is currently on.

## 2022-10-13 NOTE — PROGRESS NOTES
Pharmacokinetic Assessment Follow Up: IV Vancomycin    Vancomycin serum concentration assessment(s):    - Vancomycin 10 hour level resulted at 32.2 mcg/mL  - Goal trough 15-20 mcg/mL  - Stable renal function, Scr 1.3 mg/dL    Vancomycin Regimen Plan:    - Patient received a dose after the elevated trough. Will hold future doses for today and obtain random vancomycin level with AM Labs 10/14    Drug levels (last 3 results):  Recent Labs   Lab Result Units 10/13/22  0311   Vancomycin-Trough ug/mL 32.2*       Pharmacy will continue to follow and monitor vancomycin.    Please contact pharmacy at extension [phone] for questions regarding this assessment.    Thank you for the consult,   Julia Yeondam Roh       Patient brief summary:  Pavel Field is a 42 y.o. male initiated on antimicrobial therapy with IV Vancomycin for treatment of bone/joint infection    Drug Allergies:   Review of patient's allergies indicates:   Allergen Reactions    Vitamin c, e, zinc, copper combination no.11 Hives       Actual Body Weight:   68 kg     Renal Function:   Estimated Creatinine Clearance: 71.9 mL/min (based on SCr of 1.3 mg/dL).,     CBC (last 72 hours):  Recent Labs   Lab Result Units 10/10/22  2143 10/11/22  0443 10/12/22  0537 10/13/22  0311   WBC K/uL 8.50 7.15 12.34 8.12   Hemoglobin g/dL 10.4* 10.2* 9.8* 9.5*   Hemoglobin A1C %  --  11.0* 10.7*  --    Hematocrit % 35.4* 33.8* 32.4* 31.9*   Platelets K/uL 420 366 381 384   Gran % % 71.3 82.6* 82.6* 77.3*   Lymph % % 17.9* 9.4* 7.8* 12.3*   Mono % % 9.2 6.4 8.8 9.6   Eosinophil % % 0.8 0.4 0.2 0.4   Basophil % % 0.4 0.6 0.2 0.2   Differential Method  Automated Automated Automated Automated       Metabolic Panel (last 72 hours):  Recent Labs   Lab Result Units 10/10/22  2143 10/11/22  0443 10/12/22  0537 10/13/22  0311   Sodium mmol/L 132* 131* 130* 135*   Potassium mmol/L 4.5 4.7 4.9 4.4   Chloride mmol/L 99 99 98 100   CO2 mmol/L 26 22* 25 29   Glucose mg/dL 267* 282* 402* 385*    BUN mg/dL 19 20 23* 26*   Creatinine mg/dL 1.4 1.4 1.3 1.3   Albumin g/dL 2.3* 2.3* 2.1* 2.1*   Total Bilirubin mg/dL 0.2 0.2 0.1 0.1   Alkaline Phosphatase U/L 115 107 96 112   AST U/L 18 21 21 18   ALT U/L 20 20 20 19   Magnesium mg/dL  --  1.6  --   --    Phosphorus mg/dL  --  3.0  --   --        Vancomycin Administrations:  vancomycin given in the last 96 hours                     vancomycin in dextrose 5 % 1 gram/250 mL IVPB 1,000 mg (mg) 1,000 mg New Bag 10/13/22 0644     1,000 mg New Bag 10/12/22 1825     1,000 mg New Bag  0530    vancomycin 1.5 g in dextrose 5 % 250 mL IVPB (ready to mix) (mg) 1,500 mg New Bag 10/11/22 1901    vancomycin injection (g) 1 g Given 10/11/22 1442    vancomycin (VANCOCIN) injection 1 g (g) 1 g Given 10/11/22 1340                    Microbiologic Results:  Microbiology Results (last 7 days)       Procedure Component Value Units Date/Time    Blood culture [[id number]] Collected: 10/11/22 1040    Order Status: Completed Specimen: Blood from Peripheral, Lower Arm, Right Updated: 10/13/22 1212     Blood Culture, Routine No Growth to date      No Growth to date      No Growth to date    Narrative:      Please draw from two different peripheral sites.    Blood culture [[id number]] Collected: 10/11/22 1045    Order Status: Completed Specimen: Blood from Peripheral, Hand, Left Updated: 10/13/22 1212     Blood Culture, Routine No Growth to date      No Growth to date      No Growth to date    Narrative:      Please draw from two different peripheral sites.    Culture, Anaerobic [[id number]] Collected: 10/11/22 0005    Order Status: Completed Specimen: Joint Fluid from Shoulder, Left Updated: 10/13/22 1037     Anaerobic Culture Culture in progress    Narrative:      LEFT SHOULDER    AFB Culture & Smear [[id number]] Collected: 10/11/22 0005    Order Status: Completed Specimen: Joint Fluid from Shoulder, Left Updated: 10/12/22 0927     AFB Culture & Smear Culture in progress     AFB CULTURE  STAIN No acid fast bacilli seen.    Narrative:      LEFT SHOULDER    Aerobic culture [[id number]] Collected: 10/11/22 0005    Order Status: Completed Specimen: Shoulder, Left Updated: 10/12/22 0821     Aerobic Bacterial Culture No growth    Narrative:      LEFT SHOULDER    Fungus culture [[id number]] Collected: 10/11/22 0005    Order Status: Completed Specimen: Joint Fluid from Shoulder, Left Updated: 10/11/22 1218     Fungus (Mycology) Culture Culture in progress    Narrative:      LEFT SHOULDER    Blood culture [[id number]]     Order Status: Canceled Specimen: Blood     Blood culture [[id number]]     Order Status: Canceled Specimen: Blood     Gram stain [[id number]] Collected: 10/11/22 0005    Order Status: Completed Specimen: Joint Fluid from Shoulder, Left Updated: 10/11/22 0159     Gram Stain Result No WBC's      No organisms seen    Narrative:      LEFT SHOULDER

## 2022-10-13 NOTE — PROCEDURES
"Pavel Field is a 42 y.o. male patient.    Temp: 97.7 °F (36.5 °C) (10/13/22 1638)  Pulse: 71 (10/13/22 1638)  Resp: 17 (10/13/22 1638)  BP: 115/77 (10/13/22 1638)  SpO2: 98 % (10/13/22 1638)  Weight: 68 kg (149 lb 14.6 oz) (10/11/22 1721)  Height: 5' 10" (177.8 cm) (10/11/22 1721)    PICC  Date/Time: 10/13/2022 6:20 PM  Performed by: Adri Arguelles RN  Assisting provider: Simeon Taylor RN  Post-operative diagnosis: PICC  Consent Done: Yes  Time out: Immediately prior to procedure a time out was called to verify the correct patient, procedure, equipment, support staff and site/side marked as required  Indications: med administration and vascular access  Anesthesia: local infiltration  Local anesthetic: lidocaine 1% without epinephrine  Anesthetic Total (mL): 3  Description of findings: PICC  Preparation: skin prepped with ChloraPrep  Skin prep agent dried: skin prep agent completely dried prior to procedure  Sterile barriers: all five maximum sterile barriers used - cap, mask, sterile gown, sterile gloves, and large sterile sheet  Hand hygiene: hand hygiene performed prior to central venous catheter insertion  Location details: right basilic  Catheter type: double lumen  Catheter size: 5 Fr  Catheter Length: 39cm    Ultrasound guidance: yes  Vessel Caliber: medium and patentNeedle advanced into vessel with real time Ultrasound guidance.  Guidewire confirmed in vessel.  Image recorded and saved.  Sterile sheath used.  Number of attempts: 1  Post-procedure: blood return through all ports and sterile dressing applied  Technical procedures used: 3CG  Specimens: No  Implants: No  Assessment: placement verified by x-ray  Complications: none        Name   10/13/2022    "

## 2022-10-13 NOTE — ASSESSMENT & PLAN NOTE
- Patient with radiographic evidence of septic arthritis, osteomyelitis.  Underwent successful I&D w washout 10/11. No intra-op cultures. Will continue empiric treatment.  -appreciate Orthopedics assistantance  -resume diabetic diet after procedure  -IV Vanc and cefepime started - will narrow based on culture data from aspiration, which is so far NGTD but still pending  -ID following  -blood cultures ordered - NGTD

## 2022-10-13 NOTE — ASSESSMENT & PLAN NOTE
Patient's FSGs are uncontrolled due to hyperglycemia on current medication regimen.  Last A1c reviewed-   Lab Results   Component Value Date    HGBA1C 10.7 (H) 10/12/2022     Will start on low-dose insulin sliding scale,  Most recent fingerstick glucose reviewed-   Recent Labs   Lab 10/12/22  2033 10/13/22  0447 10/13/22  0806 10/13/22  1157   POCTGLUCOSE 345* 312* 344* 223*       Hold Oral hypoglycemics while patient is in the hospital.  -insulin adjustment prior to discharge to 36 basal (18 BID) with 10 aspart at meals + SSI  -consider endocrinology consult for appropriate management  -consider nutritional consult with care taker present for optimum diet control.

## 2022-10-13 NOTE — ASSESSMENT & PLAN NOTE
Patient currently on lisinopril 20 mg daily and furosemide, caretaker at bedside did not know his home blood pressures.  In the ED max systolic blood pressure of 160 mmhg, during time frame where patient had joint aspiration and mandibular lesion of his shoulder.  -continue home meds

## 2022-10-13 NOTE — ASSESSMENT & PLAN NOTE
Patient unable to recall last seizure, currently on carbamazepine without adverse effects although it likely contributes to his reported chronic hyponatremia.    -continue with carbamazepine 200 mg

## 2022-10-14 VITALS
HEIGHT: 70 IN | DIASTOLIC BLOOD PRESSURE: 86 MMHG | TEMPERATURE: 99 F | SYSTOLIC BLOOD PRESSURE: 140 MMHG | OXYGEN SATURATION: 98 % | RESPIRATION RATE: 18 BRPM | BODY MASS INDEX: 21.47 KG/M2 | HEART RATE: 75 BPM | WEIGHT: 149.94 LBS

## 2022-10-14 LAB
ALBUMIN SERPL BCP-MCNC: 2.3 G/DL (ref 3.5–5.2)
ALP SERPL-CCNC: 112 U/L (ref 55–135)
ALT SERPL W/O P-5'-P-CCNC: 45 U/L (ref 10–44)
ANION GAP SERPL CALC-SCNC: 4 MMOL/L (ref 8–16)
AST SERPL-CCNC: 55 U/L (ref 10–40)
BACTERIA SPEC AEROBE CULT: NO GROWTH
BASOPHILS # BLD AUTO: 0.03 K/UL (ref 0–0.2)
BASOPHILS NFR BLD: 0.4 % (ref 0–1.9)
BILIRUB SERPL-MCNC: 0.2 MG/DL (ref 0.1–1)
BUN SERPL-MCNC: 22 MG/DL (ref 6–20)
CALCIUM SERPL-MCNC: 9.1 MG/DL (ref 8.7–10.5)
CHLORIDE SERPL-SCNC: 99 MMOL/L (ref 95–110)
CO2 SERPL-SCNC: 33 MMOL/L (ref 23–29)
CREAT SERPL-MCNC: 1.1 MG/DL (ref 0.5–1.4)
DIFFERENTIAL METHOD: ABNORMAL
EOSINOPHIL # BLD AUTO: 0.1 K/UL (ref 0–0.5)
EOSINOPHIL NFR BLD: 0.7 % (ref 0–8)
ERYTHROCYTE [DISTWIDTH] IN BLOOD BY AUTOMATED COUNT: 13.2 % (ref 11.5–14.5)
EST. GFR  (NO RACE VARIABLE): >60 ML/MIN/1.73 M^2
GLUCOSE SERPL-MCNC: 239 MG/DL (ref 70–110)
HCT VFR BLD AUTO: 33.7 % (ref 40–54)
HGB BLD-MCNC: 10.7 G/DL (ref 14–18)
IMM GRANULOCYTES # BLD AUTO: 0.02 K/UL (ref 0–0.04)
IMM GRANULOCYTES NFR BLD AUTO: 0.2 % (ref 0–0.5)
LYMPHOCYTES # BLD AUTO: 1.6 K/UL (ref 1–4.8)
LYMPHOCYTES NFR BLD: 19 % (ref 18–48)
MCH RBC QN AUTO: 26.7 PG (ref 27–31)
MCHC RBC AUTO-ENTMCNC: 31.8 G/DL (ref 32–36)
MCV RBC AUTO: 84 FL (ref 82–98)
MONOCYTES # BLD AUTO: 0.8 K/UL (ref 0.3–1)
MONOCYTES NFR BLD: 9.2 % (ref 4–15)
NEUTROPHILS # BLD AUTO: 5.8 K/UL (ref 1.8–7.7)
NEUTROPHILS NFR BLD: 70.5 % (ref 38–73)
NRBC BLD-RTO: 0 /100 WBC
PLATELET # BLD AUTO: 380 K/UL (ref 150–450)
PMV BLD AUTO: 10.1 FL (ref 9.2–12.9)
POCT GLUCOSE: 229 MG/DL (ref 70–110)
POCT GLUCOSE: 313 MG/DL (ref 70–110)
POCT GLUCOSE: 99 MG/DL (ref 70–110)
POTASSIUM SERPL-SCNC: 4 MMOL/L (ref 3.5–5.1)
PROT SERPL-MCNC: 8.2 G/DL (ref 6–8.4)
RBC # BLD AUTO: 4.01 M/UL (ref 4.6–6.2)
SODIUM SERPL-SCNC: 136 MMOL/L (ref 136–145)
VANCOMYCIN SERPL-MCNC: 15.6 UG/ML
WBC # BLD AUTO: 8.25 K/UL (ref 3.9–12.7)

## 2022-10-14 PROCEDURE — 99233 PR SUBSEQUENT HOSPITAL CARE,LEVL III: ICD-10-PCS | Mod: ,,, | Performed by: PHYSICIAN ASSISTANT

## 2022-10-14 PROCEDURE — 80202 ASSAY OF VANCOMYCIN: CPT | Performed by: HOSPITALIST

## 2022-10-14 PROCEDURE — 25000003 PHARM REV CODE 250

## 2022-10-14 PROCEDURE — 99233 SBSQ HOSP IP/OBS HIGH 50: CPT | Mod: ,,, | Performed by: PHYSICIAN ASSISTANT

## 2022-10-14 PROCEDURE — 63600175 PHARM REV CODE 636 W HCPCS: Performed by: HOSPITALIST

## 2022-10-14 PROCEDURE — 85025 COMPLETE CBC W/AUTO DIFF WBC: CPT

## 2022-10-14 PROCEDURE — 80053 COMPREHEN METABOLIC PANEL: CPT

## 2022-10-14 PROCEDURE — 25000003 PHARM REV CODE 250: Performed by: PHYSICIAN ASSISTANT

## 2022-10-14 PROCEDURE — 97535 SELF CARE MNGMENT TRAINING: CPT

## 2022-10-14 PROCEDURE — A4216 STERILE WATER/SALINE, 10 ML: HCPCS | Performed by: HOSPITALIST

## 2022-10-14 PROCEDURE — 36415 COLL VENOUS BLD VENIPUNCTURE: CPT | Performed by: HOSPITALIST

## 2022-10-14 PROCEDURE — 63600175 PHARM REV CODE 636 W HCPCS: Performed by: PHYSICIAN ASSISTANT

## 2022-10-14 PROCEDURE — 99239 PR HOSPITAL DISCHARGE DAY,>30 MIN: ICD-10-PCS | Mod: GC,,, | Performed by: HOSPITALIST

## 2022-10-14 PROCEDURE — 25000003 PHARM REV CODE 250: Performed by: HOSPITALIST

## 2022-10-14 PROCEDURE — 99239 HOSP IP/OBS DSCHRG MGMT >30: CPT | Mod: GC,,, | Performed by: HOSPITALIST

## 2022-10-14 RX ORDER — INSULIN ASPART 100 [IU]/ML
12 INJECTION, SOLUTION INTRAVENOUS; SUBCUTANEOUS
Qty: 1 EACH | Refills: 1
Start: 2022-10-14 | End: 2023-04-28

## 2022-10-14 RX ORDER — INSULIN GLARGINE 100 [IU]/ML
35 INJECTION, SOLUTION SUBCUTANEOUS NIGHTLY
Start: 2022-10-14

## 2022-10-14 RX ADMIN — GABAPENTIN 300 MG: 300 CAPSULE ORAL at 03:10

## 2022-10-14 RX ADMIN — FUROSEMIDE 20 MG: 20 TABLET ORAL at 09:10

## 2022-10-14 RX ADMIN — ATORVASTATIN CALCIUM 80 MG: 40 TABLET, FILM COATED ORAL at 09:10

## 2022-10-14 RX ADMIN — CEFTRIAXONE 2 G: 2 INJECTION, POWDER, FOR SOLUTION INTRAMUSCULAR; INTRAVENOUS at 03:10

## 2022-10-14 RX ADMIN — VANCOMYCIN HYDROCHLORIDE 750 MG: 750 INJECTION, POWDER, LYOPHILIZED, FOR SOLUTION INTRAVENOUS at 09:10

## 2022-10-14 RX ADMIN — INSULIN ASPART 10 UNITS: 100 INJECTION, SOLUTION INTRAVENOUS; SUBCUTANEOUS at 12:10

## 2022-10-14 RX ADMIN — Medication 10 ML: at 12:10

## 2022-10-14 RX ADMIN — GABAPENTIN 300 MG: 300 CAPSULE ORAL at 09:10

## 2022-10-14 RX ADMIN — INSULIN ASPART 8 UNITS: 100 INJECTION, SOLUTION INTRAVENOUS; SUBCUTANEOUS at 12:10

## 2022-10-14 RX ADMIN — CARBAMAZEPINE 400 MG: 200 TABLET ORAL at 10:10

## 2022-10-14 RX ADMIN — METHOCARBAMOL 500 MG: 500 TABLET ORAL at 09:10

## 2022-10-14 RX ADMIN — VENLAFAXINE HYDROCHLORIDE 75 MG: 75 CAPSULE, EXTENDED RELEASE ORAL at 09:10

## 2022-10-14 RX ADMIN — INSULIN ASPART 4 UNITS: 100 INJECTION, SOLUTION INTRAVENOUS; SUBCUTANEOUS at 08:10

## 2022-10-14 RX ADMIN — LISINOPRIL 20 MG: 20 TABLET ORAL at 09:10

## 2022-10-14 RX ADMIN — INSULIN ASPART 10 UNITS: 100 INJECTION, SOLUTION INTRAVENOUS; SUBCUTANEOUS at 08:10

## 2022-10-14 RX ADMIN — METHOCARBAMOL 500 MG: 500 TABLET ORAL at 12:10

## 2022-10-14 NOTE — PT/OT/SLP PROGRESS
Occupational Therapy   Treatment    Name: Pavel Field  MRN: [id number]  Admitting Diagnosis:  Septic arthritis of shoulder, left  3 Days Post-Op    Recommendations:     Discharge Recommendations: home health OT  Discharge Equipment Recommendations:  none  Barriers to discharge:  None    Assessment:     Pavel Field is a 42 y.o. male with a medical diagnosis of Septic arthritis of shoulder, left.  He presents with septic arthritis of L shoudler. Performance deficits affecting function are impaired self care skills, impaired functional mobility.     Rehab Prognosis:  Good; patient would benefit from acute skilled OT services to address these deficits and reach maximum level of function.       Plan:     Patient to be seen 3 x/week to address the above listed problems via self-care/home management, therapeutic activities, therapeutic exercises  Plan of Care Expires: 10/26/22  Plan of Care Reviewed with: patient    Subjective     Pain/Comfort:  Pain Rating 1: 0/10    Objective:     Communicated with: RN prior to session.  Patient found up in chair with peripheral IV, PICC line upon OT entry to room.    General Precautions: Standard, fall   Orthopedic Precautions:LUE non weight bearing   Braces: UE Sling  Respiratory Status: Room air     Occupational Performance:     Functional Mobility/Transfers:  Patient completed Sit <> Stand Transfer with modified independence  with  no assistive device   Functional Mobility: Pt completed fxnl mobility for community distances with supervision in preparation for ADLs    Activities of Daily Living:  Grooming: supervision standing at sink  Lower Body Dressing: supervision sitting up in chair      [facility] 6 Click ADL: 21    Treatment & Education:  Pt educated on OT POC  Pt educated on using call bell to request assistance for fxnl ambulation  All questions within OT scope of practice answered to satisfaction  Pt left with all lines intact and all needs met       Patient left up in chair  with all lines intact, call button in reach, and RN notified    GOALS:   Multidisciplinary Problems       Occupational Therapy Goals          Problem: Occupational Therapy    Goal Priority Disciplines Outcome Interventions   Occupational Therapy Goal     OT, PT/OT Ongoing, Progressing    Description: Goals to be met by: 10/26/22     Patient will increase functional independence with ADLs by performing:    UE Dressing with Modified [place].  LE Dressing with Modified [place].  Grooming while standing at sink with Modified [place].  Toileting from toilet with Modified [place] for hygiene and clothing management.   Bathing from shower chair with Modified [place].  Toilet transfer to toilet with Modified [place].                         Time Tracking:     OT Date of Treatment: 10/14/22  OT Start Time: 0810  OT Stop Time: 0829  OT Total Time (min): 19 min    Billable Minutes:Self Care/Home Management 19    OT/CHERYL: OT          10/14/2022

## 2022-10-14 NOTE — SUBJECTIVE & OBJECTIVE
Interval History:   NAEON  Cultures remain negative  Feels well     Review of Systems   Constitutional:  Negative for chills, diaphoresis, fatigue and fever.   HENT:  Negative for congestion, ear pain, nosebleeds, rhinorrhea and sore throat.    Eyes:  Negative for pain.   Respiratory:  Negative for cough, shortness of breath and wheezing.    Cardiovascular:  Negative for chest pain and leg swelling.   Gastrointestinal:  Negative for abdominal pain, constipation, diarrhea, nausea and vomiting.   Genitourinary:  Negative for dysuria, frequency and urgency.   Musculoskeletal:  Negative for arthralgias, back pain and neck pain.   Neurological:  Negative for weakness, numbness and headaches.   Objective:     Vital Signs (Most Recent):  Temp: 98.6 °F (37 °C) (10/14/22 1108)  Pulse: 75 (10/14/22 1108)  Resp: 18 (10/14/22 1108)  BP: (!) 140/86 (10/14/22 1108)  SpO2: 98 % (10/14/22 1108)   Vital Signs (24h Range):  Temp:  [97.7 °F (36.5 °C)-98.6 °F (37 °C)] 98.6 °F (37 °C)  Pulse:  [71-76] 75  Resp:  [17-18] 18  SpO2:  [97 %-100 %] 98 %  BP: (112-140)/(62-86) 140/86     Weight: 68 kg (149 lb 14.6 oz)  Body mass index is 21.51 kg/m².    Estimated Creatinine Clearance: 85 mL/min (based on SCr of 1.1 mg/dL).    Physical Exam  Vitals and nursing note reviewed.   Constitutional:       General: He is not in acute distress.     Appearance: He is well-developed. He is not diaphoretic.   HENT:      Head: Normocephalic and atraumatic.   Eyes:      Pupils: Pupils are equal, round, and reactive to light.   Cardiovascular:      Rate and Rhythm: Normal rate and regular rhythm.      Heart sounds: Normal heart sounds. No murmur heard.    No friction rub. No gallop.   Pulmonary:      Effort: Pulmonary effort is normal. No respiratory distress.      Breath sounds: Normal breath sounds. No wheezing or rales.   Chest:      Chest wall: No tenderness.   Abdominal:      General: Bowel sounds are normal. There is no distension.      Palpations:  There is no mass.      Tenderness: There is no abdominal tenderness. There is no guarding.   Musculoskeletal:         General: No deformity. Normal range of motion.      Cervical back: Normal range of motion and neck supple.   Skin:     General: Skin is warm and dry.      Findings: No erythema or rash.   Neurological:      Mental Status: He is alert and oriented to person, place, and time.   Psychiatric:         Behavior: Behavior normal.         Thought Content: Thought content normal.         Judgment: Judgment normal.       Significant Labs: All pertinent labs within the past 24 hours have been reviewed.    Significant Imaging: I have reviewed all pertinent imaging results/findings within the past 24 hours.

## 2022-10-14 NOTE — SUBJECTIVE & OBJECTIVE
"Principal Problem:Septic arthritis of shoulder, left    Principal Orthopedic Problem: Same    Interval History: NAEON, patient stable, pain controlled. No acute complaints this morning.   Doing well with PT, Dressings CDI, sling in place     Review of patient's allergies indicates:   Allergen Reactions    Vitamin c, e, zinc, copper combination no.11 Hives       Current Facility-Administered Medications   Medication    aluminum-magnesium hydroxide-simethicone 200-200-20 mg/5 mL suspension 30 mL    carBAMazepine tablet 400 mg    cefTRIAXone (ROCEPHIN) 2 g/50 mL D5W IVPB    dextrose 10% bolus 125 mL    dextrose 10% bolus 250 mL    enoxaparin injection 40 mg    furosemide tablet 20 mg    gabapentin capsule 300 mg    glucagon (human recombinant) injection 1 mg    glucose chewable tablet 16 g    glucose chewable tablet 24 g    HYDROmorphone injection 1 mg    insulin aspart U-100 pen 1-10 Units    insulin aspart U-100 pen 10 Units    insulin detemir U-100 pen 18 Units    lisinopriL tablet 20 mg    methocarbamoL tablet 500 mg    morphine injection 2 mg    naloxone 0.4 mg/mL injection 0.02 mg    ondansetron tablet 4 mg    prochlorperazine injection Soln 5 mg    sodium chloride 0.9% flush 10 mL    sodium chloride 0.9% flush 10 mL    sodium chloride 0.9% flush 10 mL    And    sodium chloride 0.9% flush 10 mL    vancomycin - pharmacy to dose    vancomycin 750 mg in dextrose 5 % 250 mL IVPB (ready to mix system)    venlafaxine 24 hr capsule 75 mg     Objective:     Vital Signs (Most Recent):  Temp: 98.6 °F (37 °C) (10/14/22 1108)  Pulse: 75 (10/14/22 1108)  Resp: 18 (10/14/22 1108)  BP: (!) 140/86 (10/14/22 1108)  SpO2: 98 % (10/14/22 1108) Vital Signs (24h Range):  Temp:  [97.7 °F (36.5 °C)-98.6 °F (37 °C)] 98.6 °F (37 °C)  Pulse:  [71-76] 75  Resp:  [17-18] 18  SpO2:  [97 %-100 %] 98 %  BP: (112-140)/(62-86) 140/86     Weight: 68 kg (149 lb 14.6 oz)  Height: 5' 10" (177.8 cm)  Body mass index is 21.51 kg/m².      Intake/Output " Summary (Last 24 hours) at 10/14/2022 1321  Last data filed at 10/14/2022 1109  Gross per 24 hour   Intake 749.79 ml   Output --   Net 749.79 ml         Ortho/SPM Exam    LUE:  Dressings CDI  Motor intact wrist flexion/extension, finger flexion/extension, interossei  Sensation intact axillary, radial, median, ulnar nerves  Palp rad pulse, BCR      Significant Labs: CBC:   Recent Labs   Lab 10/13/22  0311 10/14/22  0435   WBC 8.12 8.25   HGB 9.5* 10.7*   HCT 31.9* 33.7*    380       CMP:   Recent Labs   Lab 10/13/22  0311 10/14/22  0435   * 136   K 4.4 4.0    99   CO2 29 33*   * 239*   BUN 26* 22*   CREATININE 1.3 1.1   CALCIUM 8.9 9.1   PROT 7.4 8.2   ALBUMIN 2.1* 2.3*   BILITOT 0.1 0.2   ALKPHOS 112 112   AST 18 55*   ALT 19 45*   ANIONGAP 6* 4*       All pertinent labs within the past 24 hours have been reviewed.    Significant Imaging: I have reviewed and interpreted all pertinent imaging results/findings.

## 2022-10-14 NOTE — ASSESSMENT & PLAN NOTE
Pavel Field is a 42 y.o. male presenting with left shoulder pain for the past 2 weeks. he does not have a history of gout. , , WBC 8.5, afebrile. Denies any recent fevers or chills. No reported recent prior infections. No recent recent antibiotics.     --status post irrigation and debridement of the left shoulder for septic arthritis on 10/11/2022  --PT/OT daily   --given MRI and surgical findings of likely osteomyelitis of the humeral head he will require extended course of IV antibiotic therapy  --will follow up ID recommendations, may need suppressive Abx after IV therapy   --cultures no growth today  --ID changed to Rocephin today

## 2022-10-14 NOTE — PROGRESS NOTES
Pharmacokinetic Assessment Follow Up: IV Vancomycin    Vancomycin serum concentration assessment(s):    - Vancomycin random level resulted at 15.6 mcg/mL  - Goal trough 15-20 mcg/mL  - Scr stable at 1.1 mg/dL    Vancomycin Regimen Plan:    - Schedule Vancomycin 750 mg Q12h  - Obtain trough on 10/15 @ 2000    Drug levels (last 3 results):  Recent Labs   Lab Result Units 10/13/22  0311 10/14/22  0435   Vancomycin, Random ug/mL  --  15.6   Vancomycin-Trough ug/mL 32.2*  --        Pharmacy will continue to follow and monitor vancomycin.    Please contact pharmacy at extension [phone] for questions regarding this assessment.    Thank you for the consult,   Julia Yeondam Roh       Patient brief summary:  Pavel Field is a 42 y.o. male initiated on antimicrobial therapy with IV Vancomycin for treatment of bone/joint infection    Drug Allergies:   Review of patient's allergies indicates:   Allergen Reactions    Vitamin c, e, zinc, copper combination no.11 Hives       Actual Body Weight:   68 kg     Renal Function:   Estimated Creatinine Clearance: 85 mL/min (based on SCr of 1.1 mg/dL).,     CBC (last 72 hours):  Recent Labs   Lab Result Units 10/12/22  0537 10/13/22  0311 10/14/22  0435   WBC K/uL 12.34 8.12 8.25   Hemoglobin g/dL 9.8* 9.5* 10.7*   Hemoglobin A1C % 10.7*  --   --    Hematocrit % 32.4* 31.9* 33.7*   Platelets K/uL 381 384 380   Gran % % 82.6* 77.3* 70.5   Lymph % % 7.8* 12.3* 19.0   Mono % % 8.8 9.6 9.2   Eosinophil % % 0.2 0.4 0.7   Basophil % % 0.2 0.2 0.4   Differential Method  Automated Automated Automated       Metabolic Panel (last 72 hours):  Recent Labs   Lab Result Units 10/12/22  0537 10/13/22  0311 10/14/22  0435   Sodium mmol/L 130* 135* 136   Potassium mmol/L 4.9 4.4 4.0   Chloride mmol/L 98 100 99   CO2 mmol/L 25 29 33*   Glucose mg/dL 402* 385* 239*   BUN mg/dL 23* 26* 22*   Creatinine mg/dL 1.3 1.3 1.1   Albumin g/dL 2.1* 2.1* 2.3*   Total Bilirubin mg/dL 0.1 0.1 0.2   Alkaline Phosphatase  U/L 96 112 112   AST U/L 21 18 55*   ALT U/L 20 19 45*       Vancomycin Administrations:  vancomycin given in the last 96 hours                     vancomycin 750 mg in dextrose 5 % 250 mL IVPB (ready to mix system) (mg) 750 mg New Bag 10/14/22 0946    vancomycin in dextrose 5 % 1 gram/250 mL IVPB 1,000 mg (mg) 1,000 mg New Bag 10/13/22 0644     1,000 mg New Bag 10/12/22 1825     1,000 mg New Bag  0530    vancomycin 1.5 g in dextrose 5 % 250 mL IVPB (ready to mix) (mg) 1,500 mg New Bag 10/11/22 1901    vancomycin injection (g) 1 g Given 10/11/22 1442    vancomycin (VANCOCIN) injection 1 g (g) 1 g Given 10/11/22 1340                    Microbiologic Results:  Microbiology Results (last 7 days)       Procedure Component Value Units Date/Time    Blood culture [[id number]] Collected: 10/11/22 1040    Order Status: Completed Specimen: Blood from Peripheral, Lower Arm, Right Updated: 10/14/22 1212     Blood Culture, Routine No Growth to date      No Growth to date      No Growth to date      No Growth to date    Narrative:      Please draw from two different peripheral sites.    Blood culture [[id number]] Collected: 10/11/22 1045    Order Status: Completed Specimen: Blood from Peripheral, Hand, Left Updated: 10/14/22 1212     Blood Culture, Routine No Growth to date      No Growth to date      No Growth to date      No Growth to date    Narrative:      Please draw from two different peripheral sites.    Aerobic culture [[id number]] Collected: 10/11/22 0005    Order Status: Completed Specimen: Shoulder, Left Updated: 10/14/22 1146     Aerobic Bacterial Culture No growth    Narrative:      LEFT SHOULDER    Culture, Anaerobic [[id number]] Collected: 10/11/22 0005    Order Status: Completed Specimen: Joint Fluid from Shoulder, Left Updated: 10/13/22 1037     Anaerobic Culture Culture in progress    Narrative:      LEFT SHOULDER    AFB Culture & Smear [[id number]] Collected: 10/11/22 0005    Order Status: Completed Specimen: Joint  Fluid from Shoulder, Left Updated: 10/12/22 0927     AFB Culture & Smear Culture in progress     AFB CULTURE STAIN No acid fast bacilli seen.    Narrative:      LEFT SHOULDER    Fungus culture [[id number]] Collected: 10/11/22 0005    Order Status: Completed Specimen: Joint Fluid from Shoulder, Left Updated: 10/11/22 1218     Fungus (Mycology) Culture Culture in progress    Narrative:      LEFT SHOULDER    Blood culture [[id number]]     Order Status: Canceled Specimen: Blood     Blood culture [[id number]]     Order Status: Canceled Specimen: Blood     Gram stain [[id number]] Collected: 10/11/22 0005    Order Status: Completed Specimen: Joint Fluid from Shoulder, Left Updated: 10/11/22 0159     Gram Stain Result No WBC's      No organisms seen    Narrative:      LEFT SHOULDER

## 2022-10-14 NOTE — PLAN OF CARE
[facility]    [facility] educator met with primary caregiver discussed discharge plan for home IVABX. Pavel Field will dc home with family support. Patient will infuse medication via Elastomeric Pump. primary caregiver on S.A.S.H procedure. S.A.S.H mat provided.  Patient education checklist reviewed and acknowledged by above person(s) above and are agreeable to discharge with home infusion plan of care. IV administration process using aspetic technique was reviewed with successful return demonstration. Patient feels comfortable with infusion. Patient will dc home with Vancomycin 750 mg every 12 hours and Ceftriaxone 2 gm every 24 hours for estimated end of therapy date 11/24/22 . Dosing schedule times are 0900, 2100. [facility] will follow patient for weekly dressing changes and lab draws. Time allotted for questions. Patients nurse and case management team notified teaching has been completed.     Medication delivery will be made to home    Patient accepted to care by [facility] and report called to [facility]   Phone number [phone]    [facility]  Starr Prince RN, Clinical Educator  Cell [phone]  Office [phone]  Fax [phone]

## 2022-10-14 NOTE — PROGRESS NOTES
[facility]  Orthopedics  Progress Note    Patient Name: Pavel Field  MRN: [id number]  Admission Date: 10/10/2022  Hospital Length of Stay: 3 days  Attending Provider: Monserrat Parks MD  Primary Care Provider: Ingrid Madrigal MD  Follow-up For: Procedure(s) (LRB):  DEBRIDEMENT, SHOULDER, ARTHROSCOPIC  (Left)  SYNOVECTOMY, SHOULDER (Left)    Post-Operative Day: 3 Days Post-Op  Subjective:     Principal Problem:Septic arthritis of shoulder, left    Principal Orthopedic Problem: Same    Interval History: NAEON, patient stable, pain controlled. No acute complaints this morning.   Doing well with PT, Dressings CDI, sling in place     Review of patient's allergies indicates:   Allergen Reactions    Vitamin c, e, zinc, copper combination no.11 Hives       Current Facility-Administered Medications   Medication    aluminum-magnesium hydroxide-simethicone 200-200-20 mg/5 mL suspension 30 mL    carBAMazepine tablet 400 mg    cefTRIAXone (ROCEPHIN) 2 g/50 mL D5W IVPB    dextrose 10% bolus 125 mL    dextrose 10% bolus 250 mL    enoxaparin injection 40 mg    furosemide tablet 20 mg    gabapentin capsule 300 mg    glucagon (human recombinant) injection 1 mg    glucose chewable tablet 16 g    glucose chewable tablet 24 g    HYDROmorphone injection 1 mg    insulin aspart U-100 pen 1-10 Units    insulin aspart U-100 pen 10 Units    insulin detemir U-100 pen 18 Units    lisinopriL tablet 20 mg    methocarbamoL tablet 500 mg    morphine injection 2 mg    naloxone 0.4 mg/mL injection 0.02 mg    ondansetron tablet 4 mg    prochlorperazine injection Soln 5 mg    sodium chloride 0.9% flush 10 mL    sodium chloride 0.9% flush 10 mL    sodium chloride 0.9% flush 10 mL    And    sodium chloride 0.9% flush 10 mL    vancomycin - pharmacy to dose    vancomycin 750 mg in dextrose 5 % 250 mL IVPB (ready to mix system)    venlafaxine 24 hr capsule 75 mg     Objective:     Vital Signs (Most Recent):  Temp:  "98.6 °F (37 °C) (10/14/22 1108)  Pulse: 75 (10/14/22 1108)  Resp: 18 (10/14/22 1108)  BP: (!) 140/86 (10/14/22 1108)  SpO2: 98 % (10/14/22 1108) Vital Signs (24h Range):  Temp:  [97.7 °F (36.5 °C)-98.6 °F (37 °C)] 98.6 °F (37 °C)  Pulse:  [71-76] 75  Resp:  [17-18] 18  SpO2:  [97 %-100 %] 98 %  BP: (112-140)/(62-86) 140/86     Weight: 68 kg (149 lb 14.6 oz)  Height: 5' 10" (177.8 cm)  Body mass index is 21.51 kg/m².      Intake/Output Summary (Last 24 hours) at 10/14/2022 1321  Last data filed at 10/14/2022 1109  Gross per 24 hour   Intake 749.79 ml   Output --   Net 749.79 ml         Ortho/SPM Exam    LUE:  Dressings CDI  Motor intact wrist flexion/extension, finger flexion/extension, interossei  Sensation intact axillary, radial, median, ulnar nerves  Palp rad pulse, BCR      Significant Labs: CBC:   Recent Labs   Lab 10/13/22  0311 10/14/22  0435   WBC 8.12 8.25   HGB 9.5* 10.7*   HCT 31.9* 33.7*    380       CMP:   Recent Labs   Lab 10/13/22  0311 10/14/22  0435   * 136   K 4.4 4.0    99   CO2 29 33*   * 239*   BUN 26* 22*   CREATININE 1.3 1.1   CALCIUM 8.9 9.1   PROT 7.4 8.2   ALBUMIN 2.1* 2.3*   BILITOT 0.1 0.2   ALKPHOS 112 112   AST 18 55*   ALT 19 45*   ANIONGAP 6* 4*       All pertinent labs within the past 24 hours have been reviewed.    Significant Imaging: I have reviewed and interpreted all pertinent imaging results/findings.    Assessment/Plan:     * Septic arthritis of shoulder, left  Pavel Field is a 42 y.o. male presenting with left shoulder pain for the past 2 weeks. he does not have a history of gout. , , WBC 8.5, afebrile. Denies any recent fevers or chills. No reported recent prior infections. No recent recent antibiotics.     --status post irrigation and debridement of the left shoulder for septic arthritis on 10/11/2022  --PT/OT daily   --given MRI and surgical findings of likely osteomyelitis of the humeral head he will require extended course of IV " antibiotic therapy  --will follow up ID recommendations, may need suppressive Abx after IV therapy   --cultures no growth today  --ID changed to Rocephin today           Chilango Dickens MD  Orthopedics  [facility] - Surgery

## 2022-10-14 NOTE — PROGRESS NOTES
[facility]  Infectious Disease  Progress Note    Patient Name: Pavel Field  MRN: [id number]  Admission Date: 10/10/2022  Length of Stay: 3 days  Attending Physician: Monserrat Parks MD  Primary Care Provider: Ingrid Madrigal MD    Isolation Status: No active isolations  Assessment/Plan:      * Septic arthritis of shoulder, left  41 y/o male with poorly controlled DM, (HgbA1c 11%), HTN, HLD, seizures, developmental delay presents with left shoulder pain x 2 weeks. ID consulted for septic arthritis and osteomyelitis of left shoulder. S/p I&D with orthopedics 10/11. ID consulted for abx recommendations.     Recommendations  1. Continue vancomycin. Trough supratherapeutic at 32. redose accordingly. Pharmacy following.   2. Continue to ceftriaxone  2. Cultures remain NGTD  3. Anticipate 6 weeks of IV abx therapy for osteomyelitis   4. ID will sign off    Outpatient Antibiotic Therapy Plan:    Please send referral to [facility].    1) Infection: septic arthritis and osteomyelitis    2) Discharge Antibiotics:    Intravenous antibiotics:   Vancomycin   Ceftriaxone [id number]      3) Therapy Duration:  6 weeks    Estimated end date of IV antibiotics: 11/22/22    4) Outpatient Weekly Labs:    Order the following labs to be drawn on Mondays:    CBC   CMP    CRP     CPK (when on Daptomycin)   Vancomycin trough. Target 15-20    If discharged on vancomycin IV, order the following additional labs to be drawn on Thursdays:   CMP    Vancomycin trough. Target 15-20    If vancomycin trough is not at target (15-20) prior to discharge, schedule vancomycin trough to be drawn before their fourth outpatient dose.    5) Fax Lab Results to Infectious Diseases Provider: karyn eldridge    [facility] Fax Number: [phone]    6) Outpatient Infectious Diseases Follow-up     Follow-up appointment will be arranged by the ID clinic and will be found in the patient's appointments  tab.     Prior to discharge, please ensure the patient's follow-up has been scheduled.     If there is still no follow-up scheduled prior to discharge, please send an EPIC message to Alexsandra Pham in Infectious Diseases.                    Thank you for your consult. I will sign off. Please contact us if you have any additional questions.    Jessie Guerrero PA-C  Infectious Disease  [facility]    Subjective:     Principal Problem:Septic arthritis of shoulder, left    HPI: 41 y/o male with poorly controlled DMII, (HgbA1c 11%), HTN, HLD, seizures, developmental delay presents with left shoulder pain x 2 weeks. ID consulted for septic arthritis and osteomyelitis of left shoulder. S/p I&D with orthopedics 10/11. ID consulted for abx recommendations.     Per chart review, History is assisted by the patient's caregiver.  Patient has 24 hour caregivers.  He has been complaining of left shoulder pain for the past 2.5 weeks.  Pain is constant.  Pain is worsened with changes in weather and movement.  No trauma or increased use.  Patient is suspicious that he may have slept on it wrong. Started to happen after he fell in the bath tub.  Prior to this time, he has had no previous shoulder issues.  He has tried taking Tylenol Arthritis and Biofreeze without resolution of symptoms.  No fevers.  No history of gout.  No history of IVDU.  ESR of 104 CRPP 137 in white blood count of 8.5 shoulder was aspirated with marked increase white blood cell count.  MRI of the shoulder with evidence of synovitis and capsulitis concerning for septic arthritis, osteomyelitis of the humeral head, intramuscular edema involving deltoid and rotator cuff muscles concerning for myositis and so acromial bursitis and biceps tenosynovitis. No evidence of soft tissue abscess    Interval History:   NAEON  Cultures remain negative  Feels well     Review of Systems   Constitutional:  Negative for chills, diaphoresis, fatigue and fever.   HENT:   Negative for congestion, ear pain, nosebleeds, rhinorrhea and sore throat.    Eyes:  Negative for pain.   Respiratory:  Negative for cough, shortness of breath and wheezing.    Cardiovascular:  Negative for chest pain and leg swelling.   Gastrointestinal:  Negative for abdominal pain, constipation, diarrhea, nausea and vomiting.   Genitourinary:  Negative for dysuria, frequency and urgency.   Musculoskeletal:  Negative for arthralgias, back pain and neck pain.   Neurological:  Negative for weakness, numbness and headaches.   Objective:     Vital Signs (Most Recent):  Temp: 98.6 °F (37 °C) (10/14/22 1108)  Pulse: 75 (10/14/22 1108)  Resp: 18 (10/14/22 1108)  BP: (!) 140/86 (10/14/22 1108)  SpO2: 98 % (10/14/22 1108)   Vital Signs (24h Range):  Temp:  [97.7 °F (36.5 °C)-98.6 °F (37 °C)] 98.6 °F (37 °C)  Pulse:  [71-76] 75  Resp:  [17-18] 18  SpO2:  [97 %-100 %] 98 %  BP: (112-140)/(62-86) 140/86     Weight: 68 kg (149 lb 14.6 oz)  Body mass index is 21.51 kg/m².    Estimated Creatinine Clearance: 85 mL/min (based on SCr of 1.1 mg/dL).    Physical Exam  Vitals and nursing note reviewed.   Constitutional:       General: He is not in acute distress.     Appearance: He is well-developed. He is not diaphoretic.   HENT:      Head: Normocephalic and atraumatic.   Eyes:      Pupils: Pupils are equal, round, and reactive to light.   Cardiovascular:      Rate and Rhythm: Normal rate and regular rhythm.      Heart sounds: Normal heart sounds. No murmur heard.    No friction rub. No gallop.   Pulmonary:      Effort: Pulmonary effort is normal. No respiratory distress.      Breath sounds: Normal breath sounds. No wheezing or rales.   Chest:      Chest wall: No tenderness.   Abdominal:      General: Bowel sounds are normal. There is no distension.      Palpations: There is no mass.      Tenderness: There is no abdominal tenderness. There is no guarding.   Musculoskeletal:         General: No deformity. Normal range of motion.       Cervical back: Normal range of motion and neck supple.   Skin:     General: Skin is warm and dry.      Findings: No erythema or rash.   Neurological:      Mental Status: He is alert and oriented to person, place, and time.   Psychiatric:         Behavior: Behavior normal.         Thought Content: Thought content normal.         Judgment: Judgment normal.       Significant Labs: All pertinent labs within the past 24 hours have been reviewed.    Significant Imaging: I have reviewed all pertinent imaging results/findings within the past 24 hours.

## 2022-10-14 NOTE — DISCHARGE INSTRUCTIONS
Please follow up with your doctors, including Orthopedics (for post-surgical care), Primary Care Provider (for hospital follow up and management of diabetes), and your endocrinologist (for diabetes management and insulin optimization). You will also see the Infectious Disease team in about 6 weeks.

## 2022-10-14 NOTE — PLAN OF CARE
10/14/22 1523   Post-Acute Status   Post-Acute Authorization Home Health;IV Infusion   Post-Acute Placement Status Set-up Complete/Auth obtained   Home Health Status Set-up Complete/Auth obtained     Patient's has [facility] (education completed at bedside and [facility].     3:36 PM  SW met with patient at bedside informed pt, pt agreeable. [facility], will delivery medications at bedside prior to discharge. Pt reports having stable transportation. LAYLA informed pt's nurse.       Samira Blake LMSW  Case Management   [facility]   Ext. [phone]

## 2022-10-14 NOTE — ASSESSMENT & PLAN NOTE
43 y/o male with poorly controlled DM, (HgbA1c 11%), HTN, HLD, seizures, developmental delay presents with left shoulder pain x 2 weeks. ID consulted for septic arthritis and osteomyelitis of left shoulder. S/p I&D with orthopedics 10/11. ID consulted for abx recommendations.     Recommendations  1. Continue vancomycin. Trough supratherapeutic at 32. redose accordingly. Pharmacy following.   2. Continue to ceftriaxone  2. Cultures remain NGTD  3. Anticipate 6 weeks of IV abx therapy for osteomyelitis   4. ID will sign off    Outpatient Antibiotic Therapy Plan:    Please send referral to [facility].    1) Infection: septic arthritis and osteomyelitis    2) Discharge Antibiotics:    Intravenous antibiotics:   Vancomycin   Ceftriaxone [id number]      3) Therapy Duration:  6 weeks    Estimated end date of IV antibiotics: 11/22/22    4) Outpatient Weekly Labs:    Order the following labs to be drawn on Mondays:    CBC   CMP    CRP     CPK (when on Daptomycin)   Vancomycin trough. Target 15-20    If discharged on vancomycin IV, order the following additional labs to be drawn on Thursdays:   CMP    Vancomycin trough. Target 15-20    If vancomycin trough is not at target (15-20) prior to discharge, schedule vancomycin trough to be drawn before their fourth outpatient dose.    5) Fax Lab Results to Infectious Diseases Provider: karyn eldridge    [facility] Fax Number: [phone]    6) Outpatient Infectious Diseases Follow-up     Follow-up appointment will be arranged by the ID clinic and will be found in the patient's appointments tab.     Prior to discharge, please ensure the patient's follow-up has been scheduled.     If there is still no follow-up scheduled prior to discharge, please send an EPIC message to Alexsandra Pham in Infectious Diseases.

## 2022-10-14 NOTE — DISCHARGE SUMMARY
[facility] Medicine  Discharge Summary      Patient Name: Pavel Field  MRN: [id number]  Patient Class: IP- Inpatient  Admission Date: 10/10/2022  Hospital Length of Stay: 3 days  Discharge Date and Time:  10/14/2022 2:59 PM  Attending Physician: Monserrat Parks MD   Discharging Provider: Steve Cervantes MD  Primary Care Provider: Ingrid Madrigal MD  [facility] Medicine Team: [facility]OSP MED 4 Steve Cervantes MD    HPI:   43 yo M with extensive pmhx including anxiety, depression, IDDM, HLD, HTN, seizures, developmental delay presents with a chief complaint of left shoulder pain.  History is assisted by the patient's caregiver.  Patient has 24 hour caregivers.  He has been complaining of left shoulder pain for the past 2.5 weeks.  Pain is constant.  Pain is worsened with changes in weather and movement.  No trauma or increased use.  Patient is suspicious that he may have slept on it wrong.  Prior to this time, he has had no previous shoulder issues.  He has tried taking Tylenol Arthritis and Biofreeze without resolution of symptoms.  No fevers.  No history of gout.  No history of IVDU.        Mr. Field is a 42-year-old male with a past medical history hypertension, seizures, developmental delay, hyperlipidemia, type 1 diabetes mellitus on insulin (concurrently using Dexcom 6), anxiety and depression.  He presents to the emergency department with chief complaint of left shoulder pain, that started 2 weeks ago after falling in the bath tub.  Denies trauma to the head, loss of consciousness or lacerations.  Patient states that pain was manageable at the beginning but progressively got worse limiting his range of motion.  Today pain is described as dull, 2/10 at rest and 7/10 with movement.  Pain is partially alleviated by Tylenol and Biofreeze.  Denies localized joint swelling, erythema, fevers, chills, left arm paresthesias or weakness.    In the ED shoulder xray with no acute osseous abnormality of  the left shoulder. Previously described possible left shoulder subluxation or dislocation is not seen on these radiographic views.  Orthopedics was consulted.  ESR of 104 CRPP 137 in white blood count of 8.5 shoulder was aspirated with marked increase white blood cell count.  MRI of the shoulder with evidence of synovitis and capsulitis concerning for septic arthritis, osteomyelitis of the humeral head, intramuscular edema involving deltoid and rotator cuff muscles concerning for myositis and so acromial bursitis and biceps tenosynovitis. No evidence of soft tissue abscess      Procedure(s) (LRB):  DEBRIDEMENT, SHOULDER, ARTHROSCOPIC  (Left)  SYNOVECTOMY, SHOULDER (Left)      Hospital Course:   Underwent a successful I&D by Ortho on 10/11, however unfortunately no intra-operative culture data.  Empiric Vanc + cefepime started, and ID is following.  Pain was well-controlled following surgery.  His DM meds were largely held prior to surgery but resumed soon thereafter with labile blood glucose; titrating insulin regimen. PT/OT evaluated with recommendations for home health on discharge. Now awaiting culture data for final ID recommendations for abx.     Vitals:    10/14/22 1108   BP: (!) 140/86   Pulse: 75   Resp: 18   Temp: 98.6 °F (37 °C)     Physical Exam  Constitutional:       General: He is not in acute distress.     Appearance: Normal appearance. He is not ill-appearing.   HENT:      Head: Normocephalic and atraumatic.      Right Ear: External ear normal.      Left Ear: External ear normal.      Nose: Nose normal.      Mouth/Throat:      Pharynx: Oropharynx is clear. No oropharyngeal exudate.   Eyes:      General: No scleral icterus.     Conjunctiva/sclera: Conjunctivae normal.   Cardiovascular:      Rate and Rhythm: Normal rate and regular rhythm.      Pulses: Normal pulses.      Heart sounds: Normal heart sounds. No murmur heard.  Pulmonary:      Effort: Pulmonary effort is normal. No respiratory distress.       Breath sounds: Normal breath sounds. No wheezing.   Abdominal:      Palpations: Abdomen is soft.      Tenderness: There is no abdominal tenderness. There is no guarding.   Musculoskeletal:      Cervical back: Normal range of motion.      Right lower leg: No edema.      Left lower leg: No edema.      Comments: L shoulder dressing in place, c/d/i   Skin:     General: Skin is warm and dry.   Neurological:      General: No focal deficit present.      Mental Status: He is alert and oriented to person, place, and time.   Psychiatric:         Mood and Affect: Mood normal.         Behavior: Behavior normal.       Goals of Care Treatment Preferences:  Code Status: Full Code      Consults:   Consults (From admission, onward)        Status Ordering Provider     Inpatient consult to PICC team ([facility])  Once        Provider:  (Not yet assigned)    Completed APRIL SIMS     Inpatient consult to Midline team  Once        Provider:  (Not yet assigned)    Completed FATMATA PHILLIPS     Pharmacy to dose Vancomycin consult  Once        Provider:  (Not yet assigned)   See [facility] for full Linked Orders Report.    Acknowledged ALLAN GRULLON     Inpatient consult to Infectious Diseases  Once        Provider:  (Not yet assigned)    Completed ALLAN GRULLON     Inpatient consult to Orthopedic Surgery  Once        Provider:  (Not yet assigned)    Completed VICKY ROACH          No new Assessment & Plan notes have been filed under this hospital service since the last note was generated.  Service: Hospital Medicine    Final Active Diagnoses:    Diagnosis Date Noted POA    PRINCIPAL PROBLEM:  Septic arthritis of shoulder, left [M00.9] 10/11/2022 Yes    Developmental disability [F89] 10/29/2018 Yes    Type 1 diabetes mellitus with hyperglycemia [E10.65] 12/17/2016 Yes    Essential hypertension [I10] 12/16/2016 Yes    Seizure disorder [G40.909] 12/16/2016 Yes      Problems Resolved During this Admission:       Discharged  "Condition: stable    [place] [facility]    Follow Up:   Follow-up Information     Ingrid Madrigal MD Follow up in 2 week(s).    Specialty: Cardiology  Why: For hospital follow up and help with insulin regimen.  Contact information:  [address]  [place] [id number]  [phone]                       Patient Instructions:   No discharge procedures on file.    Significant Diagnostic Studies: Radiology: MRI L Shoulder: Large glenohumeral joint effusion with synovitis and capsulitis, concerning for septic arthritis.     Osteomyelitis of the humeral head, with a 1.7 cm intraosseous abscess.     Intramuscular edema involving the deltoid and rotator cuff muscles, concerning for myositis.  No evidence of soft tissue abscess.     Subacromial subdeltoid bursitis and biceps tenosynovitis.     Low-grade partial tear of supraspinatus.    Pending Diagnostic Studies:     None         Medications:  Reconciled Home Medications:      Medication List      START taking these medications    cefTRIAXone 2 g/50 mL Pgbk IVPB  Commonly known as: ROCEPHIN  Inject 50 mLs (2 g total) into the vein once daily.     VANCOMYCIN 750 MG/250 ML D5W (READY TO MIX SYSTEM)  Inject 250 mLs (750 mg total) into the vein every 12 (twelve) hours.        CHANGE how you take these medications    BASAGLAR KWIKPEN U-100 INSULIN glargine 100 units/mL SubQ pen  Generic drug: insulin  Inject 35 Units into the skin every evening.  What changed: how much to take     insulin aspart U-100 100 unit/mL (3 mL) Inpn pen  Commonly known as: NovoLOG  Inject 12 Units into the skin 3 (three) times daily with meals.  What changed: how much to take        CONTINUE taking these medications    atorvastatin 80 MG tablet  Commonly known as: LIPITOR  Take 80 mg by mouth once daily.     BD ULTRA-FINE BRUNILDA PEN NEEDLE 32 gauge x 5/32" Ndle  Generic drug: pen needle, diabetic  Inject 1 Units into the skin 4 (four) times daily.     carBAMazepine 200 " mg tablet  Commonly known as: TEGRETOL  Take 400 mg by mouth 2 (two) times daily.     furosemide 20 MG tablet  Commonly known as: LASIX  Take 20 mg by mouth once daily.     lisinopriL 20 MG tablet  Commonly known as: PRINIVIL,ZESTRIL  Take 20 tablets by mouth once daily.     ondansetron 4 MG tablet  Commonly known as: ZOFRAN  Take 1 tablet (4 mg total) by mouth every 6 (six) hours.     * ondansetron 4 MG Tbdl  Commonly known as: ZOFRAN-ODT  Take 1 tablet (4 mg total) by mouth every 6 (six) hours as needed (nausea and vomiting).     * ondansetron 4 MG Tbdl  Commonly known as: ZOFRAN-ODT  Take 1 tablet (4 mg total) by mouth every 8 (eight) hours as needed (nausea).     promethazine 25 MG tablet  Commonly known as: PHENERGAN  Take 1 tablet (25 mg total) by mouth every 6 (six) hours as needed for Nausea.     venlafaxine 75 MG 24 hr capsule  Commonly known as: EFFEXOR-XR  Take 1 capsule by mouth once daily.         * This list has 2 medication(s) that are the same as other medications prescribed for you. Read the directions carefully, and ask your doctor or other care provider to review them with you.                Indwelling Lines/Drains at time of discharge:   Lines/Drains/Airways     Peripherally Inserted Central Catheter Line  Duration           PICC Double Lumen 10/13/22 1820 right basilic <1 day                Time spent on the discharge of patient: >35 minutes         Steve Cervantes MD  Department of Hospital Medicine  [facility] Surgery

## 2022-10-14 NOTE — PLAN OF CARE
[facility] - [facility]      HOME HEALTH ORDERS  FACE TO FACE ENCOUNTER    Patient Name: Pavel Field  YOB: 1980    PCP: Ingrid Madrigal MD   PCP Address: [address] / [place] [id number]  PCP Phone Number: [phone]  PCP Fax: [phone]    Encounter Date: 10/10/22    Admit to Home Health    Diagnoses:  Active Hospital Problems    Diagnosis  POA    *Septic arthritis of shoulder, left [M00.9]  Yes    Developmental disability [F89]  Yes    Type 1 diabetes mellitus with hyperglycemia [E10.65]  Yes    Essential hypertension [I10]  Yes    Seizure disorder [G40.909]  Yes      Resolved Hospital Problems   No resolved problems to display.       Follow Up Appointments:  Future Appointments   Date Time Provider Department Center   10/25/2022  1:30 PM RADHA Hooks   11/22/2022  1:30 PM Rowena Kingsley PA-C [facility]       Allergies:  Review of patient's allergies indicates:   Allergen Reactions    Vitamin c, e, zinc, copper combination no.11 Hives       Medications: Review discharge medications with patient and family and provide education.    Current Facility-Administered Medications   Medication Dose Route Frequency Provider Last Rate Last Admin    aluminum-magnesium hydroxide-simethicone 200-200-20 mg/5 mL suspension 30 mL  30 mL Oral QID PRN Doug Chu MD        carBAMazepine tablet 400 mg  400 mg Oral BID Femi Ahumada MD   400 mg at 10/14/22 1051    cefTRIAXone (ROCEPHIN) 2 g/50 mL D5W IVPB  2 g Intravenous Q24H Jessie Guerrero PA-C   Stopped at 10/13/22 1729    dextrose 10% bolus 125 mL  12.5 g Intravenous PRN Marisa Mina MD        dextrose 10% bolus 250 mL  25 g Intravenous PRN Marisa Mina MD        enoxaparin injection 40 mg  40 mg Subcutaneous Daily Doug Chu MD   40 mg at 10/13/22 1634    furosemide tablet 20 mg  20 mg Oral Daily Femi Ahumada MD   20 mg at 10/14/22 0915    gabapentin capsule 300  mg  300 mg Oral TID Steve Cervantes MD   300 mg at 10/14/22 0915    glucagon (human recombinant) injection 1 mg  1 mg Intramuscular PRN Femi Ahumada MD        glucose chewable tablet 16 g  16 g Oral PRN Doug Chu MD        glucose chewable tablet 24 g  24 g Oral PRN Doug Chu MD        HYDROmorphone injection 1 mg  1 mg Intravenous Q4H PRN Doug Chu MD        insulin aspart U-100 pen 1-10 Units  1-10 Units Subcutaneous QID (AC + HS) PRN Doug Chu MD   8 Units at 10/14/22 1226    insulin aspart U-100 pen 10 Units  10 Units Subcutaneous TIDWM Doug Chu MD   10 Units at 10/14/22 1227    insulin detemir U-100 pen 18 Units  18 Units Subcutaneous BID Doug Chu MD   18 Units at 10/14/22 0943    lisinopriL tablet 20 mg  20 mg Oral Daily Femi Ahumada MD   20 mg at 10/14/22 0912    methocarbamoL tablet 500 mg  500 mg Oral QID Steve Cervantes MD   500 mg at 10/14/22 1232    morphine injection 2 mg  2 mg Intravenous Q4H PRN Doug Chu MD   2 mg at 10/11/22 2232    naloxone 0.4 mg/mL injection 0.02 mg  0.02 mg Intravenous PRN Doug Chu MD        ondansetron tablet 4 mg  4 mg Oral Q8H PRN Femi Ahumada MD        prochlorperazine injection Soln 5 mg  5 mg Intravenous Q6H PRN Doug Chu MD        sodium chloride 0.9% flush 10 mL  10 mL Intravenous PRN Chilango Dickens MD        sodium chloride 0.9% flush 10 mL  10 mL Intravenous Q12H PRN Doug Chu MD        sodium chloride 0.9% flush 10 mL  10 mL Intravenous Q6H Monserrat Parks MD   10 mL at 10/14/22 1200    And    sodium chloride 0.9% flush 10 mL  10 mL Intravenous PRN Monserrat Parks MD        vancomycin - pharmacy to dose   Intravenous pharmacy to manage frequency Chilango Dickens MD        vancomycin 750 mg in dextrose 5 % 250 mL IVPB (ready to mix system)  750 mg Intravenous Q12H Monserrat Parks MD   Stopped at 10/14/22 0948    venlafaxine 24 hr capsule 75 mg  75 mg Oral Daily Femi  "Hever Ahumada MD   75 mg at 10/14/22 0916     Current Discharge Medication List        START taking these medications    Details   cefTRIAXone (ROCEPHIN) 2 g/50 mL PgBk IVPB Inject 50 mLs (2 g total) into the vein once daily.  Refills: 1      vancomycin HCl (VANCOMYCIN 750 MG/250 ML D5W, READY TO MIX SYSTEM,) Inject 250 mLs (750 mg total) into the vein every 12 (twelve) hours.  Qty: 17717 mL, Refills: 0           CONTINUE these medications which have CHANGED    Details   BASAGLAR KWIKPEN U-100 INSULIN glargine 100 units/mL SubQ pen Inject 35 Units into the skin every evening.      insulin aspart U-100 (NOVOLOG) 100 unit/mL (3 mL) InPn pen Inject 12 Units into the skin 3 (three) times daily with meals.  Qty: 1 each, Refills: 1           CONTINUE these medications which have NOT CHANGED    Details   atorvastatin (LIPITOR) 80 MG tablet Take 80 mg by mouth once daily.      carBAMazepine (TEGRETOL) 200 mg tablet Take 400 mg by mouth 2 (two) times daily.  Refills: 5      furosemide (LASIX) 20 MG tablet Take 20 mg by mouth once daily.      lisinopriL (PRINIVIL,ZESTRIL) 20 MG tablet Take 20 tablets by mouth once daily.      ondansetron (ZOFRAN) 4 MG tablet Take 1 tablet (4 mg total) by mouth every 6 (six) hours.  Qty: 12 tablet, Refills: 0      !! ondansetron (ZOFRAN-ODT) 4 MG TbDL Take 1 tablet (4 mg total) by mouth every 6 (six) hours as needed (nausea and vomiting).  Qty: 20 tablet, Refills: 0      promethazine (PHENERGAN) 25 MG tablet Take 1 tablet (25 mg total) by mouth every 6 (six) hours as needed for Nausea.  Qty: 15 tablet, Refills: 0      venlafaxine (EFFEXOR-XR) 75 MG 24 hr capsule Take 1 capsule by mouth once daily.  Refills: 5      BD ULTRA-FINE BRUNILDA PEN NEEDLE 32 gauge x 5/32" Ndle Inject 1 Units into the skin 4 (four) times daily.  Refills: 6      !! ondansetron (ZOFRAN-ODT) 4 MG TbDL Take 1 tablet (4 mg total) by mouth every 8 (eight) hours as needed (nausea).  Qty: 12 tablet, Refills: 0       !! - " Potential duplicate medications found. Please discuss with provider.            I have seen and examined this patient within the last 30 days. My clinical findings that support the need for the home health skilled services and home bound status are the following:no   Medical restrictions requiring assistance of another human to leave home due to  Decreased range of motions in extremities and IV infusion Needs.     Diet:   regular diet    Labs:  SN to perform labs:  Order the following labs to be drawn on Mondays: CBC, CMP, CRP, Vancomycin trough (target 15-20)    Thursdays: CMP, Vancomycin trough (target 15-20)    Fax Lab Results to Infectious Diseases Provider: Jessie Guerrero     [facility] Fax Number: [phone]    Referrals/ Consults  Physical Therapy to evaluate and treat. Evaluate for home safety and equipment needs; Establish/upgrade home exercise program. Perform / instruct on therapeutic exercises, gait training, transfer training, and Range of Motion.  Occupational Therapy to evaluate and treat. Evaluate home environment for safety and equipment needs. Perform/Instruct on transfers, ADL training, ROM, and therapeutic exercises.    Activities:   activity as tolerated    Nursing:   Agency to admit patient within 24 hours of hospital discharge unless specified on physician order or at patient request    SN to complete comprehensive assessment including routine vital signs. Instruct on disease process and s/s of complications to report to MD. Review/verify medication list sent home with the patient at time of discharge  and instruct patient/caregiver as needed. Frequency may be adjusted depending on start of care date.     Skilled nurse to perform up to 3 visits PRN for symptoms related to diagnosis    Notify MD if SBP > 160 or < 90; DBP > 90 or < 50; HR > 120 or < 50; Temp > 101; O2 < 88%    Ok to schedule additional visits based on staff availability and patient request on consecutive days within the  home health episode.    When multiple disciplines ordered:    Start of Care occurs on Sunday - Wednesday schedule remaining discipline evaluations as ordered on separate consecutive days following the start of care.    Thursday SOC -schedule subsequent evaluations Friday and Monday the following week.     Friday - Saturday SOC - schedule subsequent discipline evaluations on consecutive days starting Monday of the following week.    For all post-discharge communication and subsequent orders please contact patient's primary care physician. If unable to reach primary care physician or do not receive response within 30 minutes, please contact Emergency department for clinical staff order clarification    Miscellaneous   Home Infusion Therapy:   SN to perform Infusion Therapy/Central Line Care.  Review Central Line Care & Central Line Flush with patient.    Administer (drug and dose):   Vancomycin 750 mg  every 12 hours  Last dose given: 10/14/22 @  0900     Home dose due: 10/14/22 2100  Estimated end date 11/22/22    Ceftriaxone 2 g every 24 hours  Last dose given: 10/13/22  @ 1600   Home dose due: 10/14/22 @ 2100  Estimated end date 11/22/22    Scrub the Hub: Prior to accessing the line, always perform a 30 second alcohol scrub  Each lumen of the central line is to be flushed at least daily with 10 mL Normal Saline and 3 mL Heparin flush (10 units/mL)  Skilled Nurse (SN) may draw blood from IV access  Blood Draw Procedure:   - Aspirate at least 5 mL of blood   - Discard   - Obtain specimen   - Change injection cap   - Flush with 20 mL Normal Saline followed by a                 3-5 mL Heparin flush (10 units/mL)  Central :   - Sterile dressing changes are done weekly and as needed.   - Use chlor-hexadine scrub to cleanse site, apply Biopatch to insertion site,       apply securement device dressing   - Injection caps are changed weekly and after EVERY lab draw.   - If sterile gauze is under dressing to  control oozing,                 dressing change must be performed every 24 hours until gauze is not needed.    Home Health Aide:  Physical Therapy Three times weekly and Occupational Therapy Three times weekly    Wound Care Orders  no    I certify that this patient is confined to his home and needs intermittent skilled nursing care, physical therapy, and speech therapy.

## 2022-10-15 PROCEDURE — G0180 PR HOME HEALTH MD CERTIFICATION: ICD-10-PCS | Mod: ,,, | Performed by: HOSPITALIST

## 2022-10-15 PROCEDURE — G0180 MD CERTIFICATION HHA PATIENT: HCPCS | Mod: ,,, | Performed by: HOSPITALIST

## 2022-10-16 LAB
BACTERIA BLD CULT: NORMAL
BACTERIA BLD CULT: NORMAL

## 2022-10-17 ENCOUNTER — LAB VISIT (OUTPATIENT)
Dept: LAB | Facility: HOSPITAL | Age: 42
End: 2022-10-17
Attending: INTERNAL MEDICINE
Payer: MEDICARE

## 2022-10-17 DIAGNOSIS — M00.9: Primary | ICD-10-CM

## 2022-10-17 LAB
ALBUMIN SERPL BCP-MCNC: 2.7 G/DL (ref 3.5–5.2)
ALP SERPL-CCNC: 130 U/L (ref 55–135)
ALT SERPL W/O P-5'-P-CCNC: 137 U/L (ref 10–44)
ANION GAP SERPL CALC-SCNC: 8 MMOL/L (ref 8–16)
AST SERPL-CCNC: 92 U/L (ref 10–40)
BILIRUB SERPL-MCNC: 0.1 MG/DL (ref 0.1–1)
BUN SERPL-MCNC: 20 MG/DL (ref 6–20)
CALCIUM SERPL-MCNC: 9 MG/DL (ref 8.7–10.5)
CHLORIDE SERPL-SCNC: 99 MMOL/L (ref 95–110)
CO2 SERPL-SCNC: 33 MMOL/L (ref 23–29)
CREAT SERPL-MCNC: 0.9 MG/DL (ref 0.5–1.4)
CRP SERPL-MCNC: 21.4 MG/L (ref 0–8.2)
ERYTHROCYTE [DISTWIDTH] IN BLOOD BY AUTOMATED COUNT: 13.8 % (ref 11.5–14.5)
EST. GFR  (NO RACE VARIABLE): >60 ML/MIN/1.73 M^2
GLUCOSE SERPL-MCNC: 37 MG/DL (ref 70–110)
HCT VFR BLD AUTO: 33.7 % (ref 40–54)
HGB BLD-MCNC: 9.9 G/DL (ref 14–18)
MCH RBC QN AUTO: 26.1 PG (ref 27–31)
MCHC RBC AUTO-ENTMCNC: 29.4 G/DL (ref 32–36)
MCV RBC AUTO: 89 FL (ref 82–98)
PLATELET # BLD AUTO: 322 K/UL (ref 150–450)
PMV BLD AUTO: 11.5 FL (ref 9.2–12.9)
POTASSIUM SERPL-SCNC: 4.4 MMOL/L (ref 3.5–5.1)
PROT SERPL-MCNC: 7.6 G/DL (ref 6–8.4)
RBC # BLD AUTO: 3.79 M/UL (ref 4.6–6.2)
SODIUM SERPL-SCNC: 140 MMOL/L (ref 136–145)
VANCOMYCIN TROUGH SERPL-MCNC: 4 UG/ML (ref 10–22)
WBC # BLD AUTO: 8.65 K/UL (ref 3.9–12.7)

## 2022-10-17 PROCEDURE — 86140 C-REACTIVE PROTEIN: CPT | Performed by: INTERNAL MEDICINE

## 2022-10-17 PROCEDURE — 80053 COMPREHEN METABOLIC PANEL: CPT | Performed by: INTERNAL MEDICINE

## 2022-10-17 PROCEDURE — 85027 COMPLETE CBC AUTOMATED: CPT | Performed by: INTERNAL MEDICINE

## 2022-10-17 PROCEDURE — 80202 ASSAY OF VANCOMYCIN: CPT | Performed by: INTERNAL MEDICINE

## 2022-10-17 NOTE — PHYSICIAN QUERY
"PT Name: Pavel Field  MR #: [id number]    DOCUMENTATION CLARIFICATION     CDS/: Milagros Geller RN CDI         Contact information: [email or web address]   This form is a permanent document in the medical record.    Query Date: October 17, 2022  By submitting this query, we are merely seeking further clarification of documentation. Please utilize your independent clinical judgment when addressing the question(s) below.    The Medical Record contains the following:   Indicator Supporting Clinical Findings Location in Medical Record   X Documentation of "Debridement" Arthroscopic irrigation and synovectomy, major left shoulder -  Arthroscopic irrigation left subacromial and subdeltoid bursa with bursectomy  Incision of bone and Curettage of left humeral head for osteomyelitis    At this point I introduced a curette through the superior anterior portal just next to the biceps tendon and then put this into the humeral head and curetted the area of the deep bony abscess/sequestrum.       I also used the arthroscopic shaver to suction out anything that came from that hole in the humeral head as I was doing this.      I then repositioned the arthroscopic shaver through the superior anterior portal and went directly into the area that I just curetted and also used the arthroscopic shaver to remove any more loose or necrotic bone.   Op Note 10/11 MYRIAM Frye MD    Documentation of "I&D"      Other       Excisional debridement is the surgical removal or cutting away of such tissue, necrosis, or slough and is classified to the root operation "Excision." Use of a sharp instrument does not always indicate that an excisional debridement was performed. Minor removal of loose fragments with scissors or using a sharp instrument to scrape away tissue is not an excisional debridement. Excisional debridement involves the use of a scalpel to remove devitalized tissue.  Nonexcisional debridement is the nonoperative brushing, " "irrigating, scrubbing, or washing of devitalized tissue, necrosis, slough, or foreign material. Most nonexcisional debridement procedures are classified to the root operation "Extraction" (pulling or stripping out or off all or a portion of a body part by the use of force).     Provider, please provide further clarification on the procedure performed on  Left Shoulder:    [  x ] Excisional Debridement of bone     [   ] Excisional Debridement of other depth, specify __________          [   ] Non-excisional Debridement of bone     [   ] Non-excisional Debridement of other depth, specify ____________         [   ] Other Procedure (please specify): _____________     [  ] Clinically Undetermined     Reference:    ICD-10-CM/PCS Coding Clinic Third Quarter ICD-10, Effective with discharges: October 7, 2015 [facility] § Excisional and nonexcisional debridement (2015).    Form No. 53005   "

## 2022-10-18 NOTE — PLAN OF CARE
[facility]  Discharge Final Note    Primary Care Provider: Ingrid Madrigal MD    Expected Discharge Date: 10/14/2022    Final Discharge Note (most recent)       Final Note - 10/18/22 0941          Final Note    Assessment Type Final Discharge Note     Anticipated Discharge Disposition Home-Health Care [facility]     What phone number can be called within the next 1-3 days to see how you are doing after discharge? --   [phone]    Hospital Resources/Appts/Education Provided Appointments scheduled and added to AVS        Post-Acute Status    Post-Acute Authorization Home Health;IV Infusion     Post-Acute Placement Status Set-up Complete/Auth obtained                     Future Appointments   Date Time Provider Department Center   10/25/2022  1:30 PM [facility] Hooks [facility]   10/25/2022  2:00 PM Jessie Guerrero PA-C [facility]   11/22/2022  1:30 PM [facility] Hooks [facility]   11/22/2022  2:00 PM Jessie Guerrero PA-C [facility]     Patient discharged home on 10/14/22 with [facility]  and Infusion for Home IVAB.    Important Message from Medicare  Important Message from Medicare regarding Discharge Appeal Rights: Given to patient/caregiver, Explained to patient/caregiver, Signed/date by patient/caregiver     Date IMM was signed: 10/14/22  Time IMM was signed: 0926    Contact Info       Ingrid Madrigal MD   Specialty: Cardiology   Relationship: PCP - [facility]  [address] [facility] [id number]   Phone: [phone]       Next Steps: Follow up in 2 week(s)    Instructions: For hospital follow up and help with insulin regimen.    [facility] [place]   Specialty: Home Health Services    [address]  [address]  [place] [address]   Phone: [phone]       Next Steps: Follow up    [facility]    [address]       Next Steps: Follow up

## 2022-10-20 ENCOUNTER — LAB VISIT (OUTPATIENT)
Dept: LAB | Facility: HOSPITAL | Age: 42
End: 2022-10-20
Attending: HOSPITALIST
Payer: MEDICARE

## 2022-10-20 DIAGNOSIS — M00.9 PYOGENIC ARTHRITIS, UPPER ARM: Primary | ICD-10-CM

## 2022-10-20 LAB
ALBUMIN SERPL BCP-MCNC: 2.6 G/DL (ref 3.5–5.2)
ALP SERPL-CCNC: 121 U/L (ref 55–135)
ALT SERPL W/O P-5'-P-CCNC: 82 U/L (ref 10–44)
ANION GAP SERPL CALC-SCNC: 9 MMOL/L (ref 8–16)
AST SERPL-CCNC: 42 U/L (ref 10–40)
BACTERIA SPEC ANAEROBE CULT: NORMAL
BILIRUB SERPL-MCNC: 0.2 MG/DL (ref 0.1–1)
BUN SERPL-MCNC: 20 MG/DL (ref 6–20)
CALCIUM SERPL-MCNC: 8.8 MG/DL (ref 8.7–10.5)
CHLORIDE SERPL-SCNC: 99 MMOL/L (ref 95–110)
CO2 SERPL-SCNC: 26 MMOL/L (ref 23–29)
CREAT SERPL-MCNC: 1 MG/DL (ref 0.5–1.4)
EST. GFR  (NO RACE VARIABLE): >60 ML/MIN/1.73 M^2
GLUCOSE SERPL-MCNC: 183 MG/DL (ref 70–110)
POTASSIUM SERPL-SCNC: 4.2 MMOL/L (ref 3.5–5.1)
PROT SERPL-MCNC: 7.4 G/DL (ref 6–8.4)
SODIUM SERPL-SCNC: 134 MMOL/L (ref 136–145)
VANCOMYCIN TROUGH SERPL-MCNC: 11.4 UG/ML (ref 10–22)

## 2022-10-20 PROCEDURE — 80202 ASSAY OF VANCOMYCIN: CPT | Performed by: HOSPITALIST

## 2022-10-20 PROCEDURE — 80053 COMPREHEN METABOLIC PANEL: CPT | Performed by: HOSPITALIST

## 2022-10-24 ENCOUNTER — TELEPHONE (OUTPATIENT)
Dept: INFECTIOUS DISEASES | Facility: CLINIC | Age: 42
End: 2022-10-24
Payer: MEDICARE

## 2022-10-24 NOTE — TELEPHONE ENCOUNTER
----- Message from Nena Solano sent at 10/24/2022 11:03 AM CDT -----  Nursing staff requesting a callback re was able to flush no blood return have resistance pulling back attempt to regular draw on other arm still no blood              Confirmed contact below:  Contact Name:  Phone Number: [phone]

## 2022-10-25 ENCOUNTER — OFFICE VISIT (OUTPATIENT)
Dept: ORTHOPEDICS | Facility: CLINIC | Age: 42
End: 2022-10-25
Payer: MEDICARE

## 2022-10-25 ENCOUNTER — INFUSION (OUTPATIENT)
Dept: INFECTIOUS DISEASES | Facility: HOSPITAL | Age: 42
End: 2022-10-25
Attending: INTERNAL MEDICINE
Payer: MEDICARE

## 2022-10-25 ENCOUNTER — OFFICE VISIT (OUTPATIENT)
Dept: INFECTIOUS DISEASES | Facility: CLINIC | Age: 42
End: 2022-10-25
Payer: MEDICARE

## 2022-10-25 VITALS
DIASTOLIC BLOOD PRESSURE: 100 MMHG | HEIGHT: 70 IN | BODY MASS INDEX: 20.01 KG/M2 | WEIGHT: 139.75 LBS | TEMPERATURE: 98 F | SYSTOLIC BLOOD PRESSURE: 136 MMHG | DIASTOLIC BLOOD PRESSURE: 89 MMHG | HEART RATE: 69 BPM | SYSTOLIC BLOOD PRESSURE: 155 MMHG | HEART RATE: 73 BPM | HEIGHT: 70 IN | RESPIRATION RATE: 18 BRPM | BODY MASS INDEX: 21.47 KG/M2 | WEIGHT: 149.94 LBS

## 2022-10-25 DIAGNOSIS — Z51.81 THERAPEUTIC DRUG MONITORING: ICD-10-CM

## 2022-10-25 DIAGNOSIS — M00.9 PYOGENIC ARTHRITIS OF LEFT SHOULDER REGION, DUE TO UNSPECIFIED ORGANISM: Primary | ICD-10-CM

## 2022-10-25 PROCEDURE — 99999 PR PBB SHADOW E&M-EST. PATIENT-LVL III: ICD-10-PCS | Mod: PBBFAC,,, | Performed by: PHYSICIAN ASSISTANT

## 2022-10-25 PROCEDURE — 86140 C-REACTIVE PROTEIN: CPT | Performed by: PHYSICIAN ASSISTANT

## 2022-10-25 PROCEDURE — 36592 COLLECT BLOOD FROM PICC: CPT

## 2022-10-25 PROCEDURE — 96374 THER/PROPH/DIAG INJ IV PUSH: CPT

## 2022-10-25 PROCEDURE — 99213 OFFICE O/P EST LOW 20 MIN: CPT | Mod: PBBFAC,25 | Performed by: PHYSICIAN ASSISTANT

## 2022-10-25 PROCEDURE — 99214 OFFICE O/P EST MOD 30 MIN: CPT | Mod: S$PBB,,, | Performed by: PHYSICIAN ASSISTANT

## 2022-10-25 PROCEDURE — 99214 PR OFFICE/OUTPT VISIT, EST, LEVL IV, 30-39 MIN: ICD-10-PCS | Mod: S$PBB,,, | Performed by: PHYSICIAN ASSISTANT

## 2022-10-25 PROCEDURE — 80202 ASSAY OF VANCOMYCIN: CPT | Performed by: PHYSICIAN ASSISTANT

## 2022-10-25 PROCEDURE — 99999 PR PBB SHADOW E&M-EST. PATIENT-LVL III: CPT | Mod: PBBFAC,,, | Performed by: PHYSICIAN ASSISTANT

## 2022-10-25 PROCEDURE — 99999 PR PBB SHADOW E&M-EST. PATIENT-LVL IV: ICD-10-PCS | Mod: PBBFAC,,, | Performed by: PHYSICIAN ASSISTANT

## 2022-10-25 PROCEDURE — 80053 COMPREHEN METABOLIC PANEL: CPT | Performed by: PHYSICIAN ASSISTANT

## 2022-10-25 PROCEDURE — 99214 OFFICE O/P EST MOD 30 MIN: CPT | Mod: PBBFAC,25,27 | Performed by: PHYSICIAN ASSISTANT

## 2022-10-25 PROCEDURE — 99024 POSTOP FOLLOW-UP VISIT: CPT | Mod: POP,,, | Performed by: PHYSICIAN ASSISTANT

## 2022-10-25 PROCEDURE — 63600175 PHARM REV CODE 636 W HCPCS: Performed by: PHYSICIAN ASSISTANT

## 2022-10-25 PROCEDURE — 99024 PR POST-OP FOLLOW-UP VISIT: ICD-10-PCS | Mod: POP,,, | Performed by: PHYSICIAN ASSISTANT

## 2022-10-25 PROCEDURE — 85025 COMPLETE CBC W/AUTO DIFF WBC: CPT | Performed by: PHYSICIAN ASSISTANT

## 2022-10-25 PROCEDURE — 99999 PR PBB SHADOW E&M-EST. PATIENT-LVL IV: CPT | Mod: PBBFAC,,, | Performed by: PHYSICIAN ASSISTANT

## 2022-10-25 RX ORDER — PEN NEEDLE, DIABETIC 30 GX3/16"
1 NEEDLE, DISPOSABLE MISCELLANEOUS
COMMUNITY
Start: 2022-10-21 | End: 2022-11-20

## 2022-10-25 RX ORDER — DEXTROSE 4 G
TABLET,CHEWABLE ORAL
COMMUNITY

## 2022-10-25 RX ORDER — INSULIN ASPART 100 [IU]/ML
3 INJECTION, SOLUTION INTRAVENOUS; SUBCUTANEOUS
COMMUNITY
Start: 2022-10-21 | End: 2022-11-20

## 2022-10-25 RX ORDER — ONDANSETRON 8 MG/1
8 TABLET, ORALLY DISINTEGRATING ORAL EVERY 8 HOURS
COMMUNITY
Start: 2022-05-05 | End: 2022-11-22

## 2022-10-25 RX ORDER — PREDNISOLONE ACETATE 10 MG/ML
1 SUSPENSION/ DROPS OPHTHALMIC 2 TIMES DAILY
COMMUNITY
Start: 2022-09-06

## 2022-10-25 RX ORDER — CETIRIZINE HYDROCHLORIDE 10 MG/1
TABLET ORAL
COMMUNITY

## 2022-10-25 RX ORDER — VENLAFAXINE HYDROCHLORIDE 37.5 MG/1
37.5 CAPSULE, EXTENDED RELEASE ORAL DAILY
COMMUNITY
Start: 2022-10-21

## 2022-10-25 RX ORDER — CIPROFLOXACIN HYDROCHLORIDE 3 MG/ML
1 SOLUTION/ DROPS OPHTHALMIC 4 TIMES DAILY
COMMUNITY
Start: 2022-09-01

## 2022-10-25 RX ORDER — LISINOPRIL 10 MG/1
TABLET ORAL
COMMUNITY
Start: 2022-05-20 | End: 2022-11-22

## 2022-10-25 RX ORDER — SODIUM CHLORIDE 0.9 % (FLUSH) 0.9 %
10 SYRINGE (ML) INJECTION
Status: DISCONTINUED | OUTPATIENT
Start: 2022-10-25 | End: 2022-10-25 | Stop reason: HOSPADM

## 2022-10-25 RX ORDER — DOCUSATE SODIUM 100 MG/1
CAPSULE, LIQUID FILLED ORAL
COMMUNITY
Start: 2020-11-23 | End: 2022-11-22

## 2022-10-25 RX ORDER — POTASSIUM CHLORIDE 20 MEQ/1
20 TABLET, EXTENDED RELEASE ORAL DAILY
COMMUNITY
Start: 2022-09-22

## 2022-10-25 RX ORDER — SODIUM CHLORIDE 0.9 % (FLUSH) 0.9 %
10 SYRINGE (ML) INJECTION
OUTPATIENT
Start: 2022-10-25

## 2022-10-25 RX ORDER — HEPARIN 100 UNIT/ML
500 SYRINGE INTRAVENOUS
Status: DISCONTINUED | OUTPATIENT
Start: 2022-10-25 | End: 2022-10-25 | Stop reason: HOSPADM

## 2022-10-25 RX ORDER — INSULIN GLARGINE 100 [IU]/ML
10 INJECTION, SOLUTION SUBCUTANEOUS
COMMUNITY
Start: 2022-10-21 | End: 2022-11-20

## 2022-10-25 RX ORDER — HEPARIN 100 UNIT/ML
500 SYRINGE INTRAVENOUS
Status: CANCELLED | OUTPATIENT
Start: 2022-10-25

## 2022-10-25 RX ORDER — VENLAFAXINE HYDROCHLORIDE 150 MG/1
150 CAPSULE, EXTENDED RELEASE ORAL DAILY
COMMUNITY
Start: 2022-10-21

## 2022-10-25 RX ORDER — SODIUM CHLORIDE 0.9 % (FLUSH) 0.9 %
10 SYRINGE (ML) INJECTION
Status: CANCELLED | OUTPATIENT
Start: 2022-10-25

## 2022-10-25 RX ORDER — FLUTICASONE PROPIONATE 50 MCG
SPRAY, SUSPENSION (ML) NASAL
COMMUNITY
Start: 2022-10-19

## 2022-10-25 RX ORDER — BRIMONIDINE TARTRATE 2 MG/ML
1 SOLUTION/ DROPS OPHTHALMIC 3 TIMES DAILY
COMMUNITY
Start: 2022-10-05

## 2022-10-25 RX ORDER — HEPARIN 100 UNIT/ML
500 SYRINGE INTRAVENOUS
OUTPATIENT
Start: 2022-10-25

## 2022-10-25 RX ORDER — CALCIUM CITRATE/VITAMIN D3 200MG-6.25
TABLET ORAL 4 TIMES DAILY
COMMUNITY
Start: 2022-06-16

## 2022-10-25 RX ORDER — BLOOD-GLUCOSE SENSOR
EACH MISCELLANEOUS
COMMUNITY
Start: 2022-07-15

## 2022-10-25 RX ADMIN — ALTEPLASE 2 MG: 2.2 INJECTION, POWDER, LYOPHILIZED, FOR SOLUTION INTRAVENOUS at 03:10

## 2022-10-25 RX ADMIN — HEPARIN 500 UNITS: 100 SYRINGE at 02:10

## 2022-10-25 NOTE — PROGRESS NOTES
Pt arrived for right upper arm PICC check. Both the purple and red ports flush easily. Cathflo adminsitered to the red port. Pt instructed to wait 30 minutes before the line can be reassessed. Pt and caregiver verbalized understanding of instructions.

## 2022-10-25 NOTE — PROGRESS NOTES
Both the red and purple port flush and draw back blood easily after the cath emely administration. Labs collected. Pt left with caregiver in NAD.

## 2022-10-25 NOTE — PROGRESS NOTES
Subjective:       Patient ID: Pavel Field is a 42 y.o. male.    Chief Complaint: Follow-up    HPI    41 y/o male with poorly controlled DM, (HgbA1c 11%), HTN, HLD, seizures, developmental delay presents with left shoulder pain x 2 weeks. ID consulted for septic arthritis and osteomyelitis of left shoulder. S/p I&D with orthopedics 10/11. Cultures NGTD. Gram stain negative. Pt was discharged on vancomycin and ceftriaxone x 6 weeks EOC 11/22/22. Seen with caretaker at bedside. Picc line not able to draw blood, abx infusion without difficulty. Moving his shoulder much better. Saw ortho this AM.     Review of Systems   Constitutional:  Negative for activity change, appetite change, chills, diaphoresis, fatigue and fever.   Respiratory:  Negative for cough and shortness of breath.    Gastrointestinal:  Negative for abdominal pain, diarrhea, nausea and vomiting.   Genitourinary:  Negative for dysuria.   Musculoskeletal:  Negative for arthralgias, back pain and joint swelling.   Integumentary:  Negative for rash.   Neurological:  Negative for dizziness and headaches.       Objective:      Physical Exam  Vitals and nursing note reviewed.   Constitutional:       General: He is not in acute distress.     Appearance: He is well-developed. He is not diaphoretic.   HENT:      Head: Normocephalic and atraumatic.   Eyes:      Pupils: Pupils are equal, round, and reactive to light.   Cardiovascular:      Rate and Rhythm: Normal rate and regular rhythm.      Heart sounds: Normal heart sounds. No murmur heard.    No friction rub. No gallop.   Pulmonary:      Effort: Pulmonary effort is normal. No respiratory distress.      Breath sounds: Normal breath sounds. No wheezing or rales.   Chest:      Chest wall: No tenderness.   Abdominal:      General: Bowel sounds are normal. There is no distension.      Palpations: There is no mass.      Tenderness: There is no abdominal tenderness. There is no guarding.   Musculoskeletal:          General: Swelling and tenderness present. No deformity.      Cervical back: Normal range of motion and neck supple.   Skin:     General: Skin is warm and dry.      Findings: No erythema or rash.      Comments: Picc line stable   Neurological:      Mental Status: He is alert and oriented to person, place, and time.   Psychiatric:         Behavior: Behavior normal.         Thought Content: Thought content normal.         Judgment: Judgment normal.       Assessment:       Problem List Items Addressed This Visit          ID    Septic arthritis of shoulder, left - Primary    Relevant Orders    CBC Auto Differential (Completed)    Comprehensive Metabolic Panel (Completed)    C-reactive protein (Completed)    Vancomycin, Random (Completed)    Nursing communication     Other Visit Diagnoses       Therapeutic drug monitoring        Relevant Orders    CBC Auto Differential (Completed)    Comprehensive Metabolic Panel (Completed)    C-reactive protein (Completed)    Vancomycin, Random (Completed)    Nursing communication            Plan:           Cath flow today  Continue vancomycin and ceftriaxone EOC 11/22/22  Cbc cmp crp vanc random today.   Follow up at end of care  Continue folow up with orth surg as scheduled  All questions answered. Discussed with caretaker       >35 minutes of total time spent on the encounter, which includes face to face time and non-face to face time preparing to see the patient (eg, review of tests), Obtaining and/or reviewing separately obtained history, Documenting clinical information in the electronic or other health record, Independently interpreting results (not separately reported) and communicating results to the patient/family/caregiver, or Care coordination (not separately reported).

## 2022-10-26 LAB
ALBUMIN SERPL BCP-MCNC: 3 G/DL (ref 3.5–5.2)
ALP SERPL-CCNC: 135 U/L (ref 55–135)
ALT SERPL W/O P-5'-P-CCNC: 54 U/L (ref 10–44)
ANION GAP SERPL CALC-SCNC: 11 MMOL/L (ref 8–16)
AST SERPL-CCNC: 24 U/L (ref 10–40)
BASOPHILS # BLD AUTO: 0.05 K/UL (ref 0–0.2)
BASOPHILS NFR BLD: 0.7 % (ref 0–1.9)
BILIRUB SERPL-MCNC: 0.1 MG/DL (ref 0.1–1)
BUN SERPL-MCNC: 27 MG/DL (ref 6–20)
CALCIUM SERPL-MCNC: 9 MG/DL (ref 8.7–10.5)
CHLORIDE SERPL-SCNC: 103 MMOL/L (ref 95–110)
CO2 SERPL-SCNC: 20 MMOL/L (ref 23–29)
CREAT SERPL-MCNC: 1.4 MG/DL (ref 0.5–1.4)
CRP SERPL-MCNC: 8.6 MG/L (ref 0–8.2)
DIFFERENTIAL METHOD: ABNORMAL
EOSINOPHIL # BLD AUTO: 0.1 K/UL (ref 0–0.5)
EOSINOPHIL NFR BLD: 1.3 % (ref 0–8)
ERYTHROCYTE [DISTWIDTH] IN BLOOD BY AUTOMATED COUNT: 15.6 % (ref 11.5–14.5)
EST. GFR  (NO RACE VARIABLE): >60 ML/MIN/1.73 M^2
GLUCOSE SERPL-MCNC: 273 MG/DL (ref 70–110)
HCT VFR BLD AUTO: 36.6 % (ref 40–54)
HGB BLD-MCNC: 10.7 G/DL (ref 14–18)
IMM GRANULOCYTES # BLD AUTO: 0.02 K/UL (ref 0–0.04)
IMM GRANULOCYTES NFR BLD AUTO: 0.3 % (ref 0–0.5)
LYMPHOCYTES # BLD AUTO: 0.9 K/UL (ref 1–4.8)
LYMPHOCYTES NFR BLD: 12.4 % (ref 18–48)
MCH RBC QN AUTO: 26.5 PG (ref 27–31)
MCHC RBC AUTO-ENTMCNC: 29.2 G/DL (ref 32–36)
MCV RBC AUTO: 91 FL (ref 82–98)
MONOCYTES # BLD AUTO: 0.5 K/UL (ref 0.3–1)
MONOCYTES NFR BLD: 6.6 % (ref 4–15)
NEUTROPHILS # BLD AUTO: 5.8 K/UL (ref 1.8–7.7)
NEUTROPHILS NFR BLD: 78.7 % (ref 38–73)
NRBC BLD-RTO: 0 /100 WBC
PLATELET # BLD AUTO: 282 K/UL (ref 150–450)
PMV BLD AUTO: 12.2 FL (ref 9.2–12.9)
POTASSIUM SERPL-SCNC: 6.1 MMOL/L (ref 3.5–5.1)
PROT SERPL-MCNC: 8.3 G/DL (ref 6–8.4)
RBC # BLD AUTO: 4.04 M/UL (ref 4.6–6.2)
SODIUM SERPL-SCNC: 134 MMOL/L (ref 136–145)
VANCOMYCIN SERPL-MCNC: 25.2 UG/ML
WBC # BLD AUTO: 7.41 K/UL (ref 3.9–12.7)

## 2022-10-26 NOTE — PROGRESS NOTES
Principal Orthopedic Problem: septic arthritis shoulder     Relevant Medical History: according to chart    PMHX:  DM, (HgbA1c 11%), HTN, HLD, seizures, developmental delay presents     HPI: Mr. Field is 42 year old male who presented to the Ed on 10/11/22 with left shoulder pain after fall. Initial imaging in the ED showed a possible L shoulder subluxation and reduction was attempted by the ED without success. Orthopedics was then consulted for this. Inflammatory markers were elevated and on exam he has a large shoulder effusion.  , , WBC 8.5.   RADS: Possible anterior subluxation of the left shoulder with a large joint effusion.   Lab Results   Component Value Date     WBCBF 429705 10/11/2022     SEGS 94 10/11/2022     CRYST Negative 10/11/2022     BODYFLUIDTYP Joint Fluid Left Shoulder      10/11/22: :  Arthroscopic irrigation and synovectomy, major left shoulder - 12625  Athroscopic irrigation left subacromial and subdeltoid bursa with bursectomy  Incision of bone and Curettage of left humeral head for osteomyelitis     Per ID  MICRO: NGTD  vancomycin and ceftriaxone  Estimated end date of IV antibiotics: 11/22/22      Mr. Field is here today for a post-operative visit    Interval History:  he reports that he is doing ok.   he is at home . he is not participating in PT/OT.   Pain is controled.  he is not taking pain medication.  Reports feeling better in his shoulder  he denies fever, chills, and sweats .   He has been given antibiotics as prescribed. Nurse reports that they could not draw back blood yesterday from PICC  and reached out to ID. He did get antibiotics today.     Physical exam:    Patient arrives to exam room: walked in.  Patient is accompanied by nurse from facility   Dressing taken down.  Incision is clean, dry and intact.  Sutures removed without difficulty.   Healing well no signs of breakdown or infection.    RADS: All pertinent images were reviewed by myself:   none  done today    Assessment:  Post-op visit ( 2 weeks)    Plan:  Current care, treatment plan, precautions, activity level/ modifications, limitations, rehabilitation exercises and proposed future treatment were discussed with the patient. We discussed the need to monitor for changes in symptoms and condition and report them to the physician.  Discussed importance of compliance with all appointments and follow up examinations.     WOUND CARE ORDERS  - The patient was advised to keep the incision clean and dry for the next 24 hours after which he may wash the area with antibacterial soap in the shower. Will not submerge until the incision is completely healed  -Patient was advised to monitor wound closely and multiple times daily for any problems. Call clinic immediately or report to ED for immediate medical attention for any complications including reopening of wound, drainage, purulence, redness, streaking, odor, pain out of proportion, fever, chills, etc.     - will not get PICC wet       ACTIVITY:   - as tolerated   -range of motion as tolerated        PAIN MEDICATION:   - Multimodal pain control  - Pain medication: refill was not needed  - Pain medication refill policy provided to patient for review, yes.    - Patient was informed a multi-modal approach is used to treat their pain. With the goal to get off of narcotic pain medication and discontinue as soon as possible.   - ice and elevation to reduce pain and swelling     DVT PROPHYLAXIS:   - ASA 81 mg bid    OTHER:   Continue antibiotics as prescribed by ID  - got appointment today with ID for PICC line    FOLLOW UP:   - Patient will follow up in the clinic in 4 weeks.  - X-ray of his shoulder 2 view AP scap Y  is needed.        If there are any questions prior to scheduled follow up, the patient was instructed to contact the office

## 2022-10-27 ENCOUNTER — TELEPHONE (OUTPATIENT)
Dept: ORTHOPEDICS | Facility: CLINIC | Age: 42
End: 2022-10-27
Payer: MEDICARE

## 2022-10-27 DIAGNOSIS — M00.9 PYOGENIC ARTHRITIS, UPPER ARM: Primary | ICD-10-CM

## 2022-10-27 DIAGNOSIS — M00.9 PYOGENIC ARTHRITIS OF LEFT SHOULDER REGION, DUE TO UNSPECIFIED ORGANISM: Primary | ICD-10-CM

## 2022-10-27 NOTE — TELEPHONE ENCOUNTER
----- Message from Rowena Kingsley PA-C sent at 10/27/2022 10:12 AM CDT -----  Orders placed     ----- Message -----  From: Fide Hatch MA  Sent: 10/27/2022   9:18 AM CDT  To: Rowena Kingsley PA-C    Spoke with Bere with [facility] [phone] Fax # [phone].  Need orders for home health ot/pt.

## 2022-10-28 ENCOUNTER — LAB VISIT (OUTPATIENT)
Dept: LAB | Facility: HOSPITAL | Age: 42
End: 2022-10-28
Attending: PHYSICIAN ASSISTANT
Payer: MEDICARE

## 2022-10-28 DIAGNOSIS — M00.9 PYOGENIC ARTHRITIS, UPPER ARM: Primary | ICD-10-CM

## 2022-10-28 LAB
ALBUMIN SERPL BCP-MCNC: 3.3 G/DL (ref 3.5–5.2)
ALP SERPL-CCNC: 142 U/L (ref 55–135)
ALT SERPL W/O P-5'-P-CCNC: 49 U/L (ref 10–44)
ANION GAP SERPL CALC-SCNC: 16 MMOL/L (ref 8–16)
AST SERPL-CCNC: 24 U/L (ref 10–40)
BILIRUB SERPL-MCNC: 0.2 MG/DL (ref 0.1–1)
BUN SERPL-MCNC: 23 MG/DL (ref 6–20)
CALCIUM SERPL-MCNC: 9.5 MG/DL (ref 8.7–10.5)
CHLORIDE SERPL-SCNC: 100 MMOL/L (ref 95–110)
CO2 SERPL-SCNC: 21 MMOL/L (ref 23–29)
CREAT SERPL-MCNC: 1.4 MG/DL (ref 0.5–1.4)
EST. GFR  (NO RACE VARIABLE): >60 ML/MIN/1.73 M^2
GLUCOSE SERPL-MCNC: 233 MG/DL (ref 70–110)
POTASSIUM SERPL-SCNC: 4.5 MMOL/L (ref 3.5–5.1)
PROT SERPL-MCNC: 8.3 G/DL (ref 6–8.4)
SODIUM SERPL-SCNC: 137 MMOL/L (ref 136–145)
VANCOMYCIN TROUGH SERPL-MCNC: <1.4 UG/ML (ref 10–22)

## 2022-10-28 PROCEDURE — 80202 ASSAY OF VANCOMYCIN: CPT | Performed by: PHYSICIAN ASSISTANT

## 2022-10-28 PROCEDURE — 36415 COLL VENOUS BLD VENIPUNCTURE: CPT | Performed by: PHYSICIAN ASSISTANT

## 2022-10-28 PROCEDURE — 80053 COMPREHEN METABOLIC PANEL: CPT | Performed by: PHYSICIAN ASSISTANT

## 2022-10-31 ENCOUNTER — LAB VISIT (OUTPATIENT)
Dept: LAB | Facility: HOSPITAL | Age: 42
End: 2022-10-31
Attending: HOSPITALIST
Payer: MEDICARE

## 2022-10-31 DIAGNOSIS — M00.9 PYOGENIC ARTHRITIS, UPPER ARM: Primary | ICD-10-CM

## 2022-10-31 LAB
ALBUMIN SERPL BCP-MCNC: 2.8 G/DL (ref 3.5–5.2)
ALP SERPL-CCNC: 119 U/L (ref 55–135)
ALT SERPL W/O P-5'-P-CCNC: 33 U/L (ref 10–44)
ANION GAP SERPL CALC-SCNC: 7 MMOL/L (ref 8–16)
AST SERPL-CCNC: 22 U/L (ref 10–40)
BILIRUB SERPL-MCNC: 0.2 MG/DL (ref 0.1–1)
BUN SERPL-MCNC: 19 MG/DL (ref 6–20)
CALCIUM SERPL-MCNC: 8.5 MG/DL (ref 8.7–10.5)
CHLORIDE SERPL-SCNC: 104 MMOL/L (ref 95–110)
CO2 SERPL-SCNC: 26 MMOL/L (ref 23–29)
CREAT SERPL-MCNC: 1.3 MG/DL (ref 0.5–1.4)
CRP SERPL-MCNC: 109.1 MG/L (ref 0–8.2)
EST. GFR  (NO RACE VARIABLE): >60 ML/MIN/1.73 M^2
GLUCOSE SERPL-MCNC: 203 MG/DL (ref 70–110)
POTASSIUM SERPL-SCNC: 4 MMOL/L (ref 3.5–5.1)
PROT SERPL-MCNC: 7.2 G/DL (ref 6–8.4)
SODIUM SERPL-SCNC: 137 MMOL/L (ref 136–145)
VANCOMYCIN SERPL-MCNC: 23 UG/ML

## 2022-10-31 PROCEDURE — 86140 C-REACTIVE PROTEIN: CPT | Performed by: HOSPITALIST

## 2022-10-31 PROCEDURE — 80053 COMPREHEN METABOLIC PANEL: CPT | Performed by: HOSPITALIST

## 2022-10-31 PROCEDURE — 85025 COMPLETE CBC W/AUTO DIFF WBC: CPT | Performed by: HOSPITALIST

## 2022-10-31 PROCEDURE — 36415 COLL VENOUS BLD VENIPUNCTURE: CPT | Performed by: HOSPITALIST

## 2022-10-31 PROCEDURE — 80202 ASSAY OF VANCOMYCIN: CPT | Performed by: HOSPITALIST

## 2022-11-01 LAB
BASOPHILS # BLD AUTO: 0.05 K/UL (ref 0–0.2)
BASOPHILS NFR BLD: 0.6 % (ref 0–1.9)
DIFFERENTIAL METHOD: ABNORMAL
EOSINOPHIL # BLD AUTO: 0.2 K/UL (ref 0–0.5)
EOSINOPHIL NFR BLD: 2.7 % (ref 0–8)
ERYTHROCYTE [DISTWIDTH] IN BLOOD BY AUTOMATED COUNT: 15.3 % (ref 11.5–14.5)
HCT VFR BLD AUTO: 31 % (ref 40–54)
HGB BLD-MCNC: 9.6 G/DL (ref 14–18)
IMM GRANULOCYTES # BLD AUTO: 0.1 K/UL (ref 0–0.04)
IMM GRANULOCYTES NFR BLD AUTO: 1.2 % (ref 0–0.5)
LYMPHOCYTES # BLD AUTO: 0.9 K/UL (ref 1–4.8)
LYMPHOCYTES NFR BLD: 10.9 % (ref 18–48)
MCH RBC QN AUTO: 26.3 PG (ref 27–31)
MCHC RBC AUTO-ENTMCNC: 31 G/DL (ref 32–36)
MCV RBC AUTO: 85 FL (ref 82–98)
MONOCYTES # BLD AUTO: 0.9 K/UL (ref 0.3–1)
MONOCYTES NFR BLD: 10.5 % (ref 4–15)
NEUTROPHILS # BLD AUTO: 6.2 K/UL (ref 1.8–7.7)
NEUTROPHILS NFR BLD: 74.1 % (ref 38–73)
NRBC BLD-RTO: 0 /100 WBC
PLATELET # BLD AUTO: 179 K/UL (ref 150–450)
PMV BLD AUTO: 12.5 FL (ref 9.2–12.9)
RBC # BLD AUTO: 3.65 M/UL (ref 4.6–6.2)
WBC # BLD AUTO: 8.37 K/UL (ref 3.9–12.7)

## 2022-11-02 ENCOUNTER — DOCUMENT SCAN (OUTPATIENT)
Dept: HOME HEALTH SERVICES | Facility: HOSPITAL | Age: 42
End: 2022-11-02
Payer: MEDICARE

## 2022-11-04 ENCOUNTER — DOCUMENT SCAN (OUTPATIENT)
Dept: HOME HEALTH SERVICES | Facility: HOSPITAL | Age: 42
End: 2022-11-04
Payer: MEDICARE

## 2022-11-04 ENCOUNTER — TELEPHONE (OUTPATIENT)
Dept: INFECTIOUS DISEASES | Facility: CLINIC | Age: 42
End: 2022-11-04
Payer: MEDICARE

## 2022-11-04 NOTE — TELEPHONE ENCOUNTER
----- Message from Maicol Arguelles sent at 11/4/2022  3:56 AM CDT -----  Regarding: Client Service  Contact: [phone]  Good Morning,     My name is Maicol Arguelles, I work in the Lab Client Services. We had a problem with some lab work on this patient. If someone from your office could call us at [phone] or [phone]. [phone] that would be great. Anyone in my department can help.      Thank you,

## 2022-11-05 ENCOUNTER — LAB VISIT (OUTPATIENT)
Dept: LAB | Facility: HOSPITAL | Age: 42
End: 2022-11-05
Attending: PHYSICIAN ASSISTANT
Payer: MEDICARE

## 2022-11-05 DIAGNOSIS — M00.9 PYOGENIC ARTHRITIS, UPPER ARM: Primary | ICD-10-CM

## 2022-11-05 LAB
ALBUMIN SERPL BCP-MCNC: 2.5 G/DL (ref 3.5–5.2)
ALP SERPL-CCNC: 160 U/L (ref 55–135)
ALT SERPL W/O P-5'-P-CCNC: 37 U/L (ref 10–44)
ANION GAP SERPL CALC-SCNC: 7 MMOL/L (ref 8–16)
AST SERPL-CCNC: 32 U/L (ref 10–40)
BILIRUB SERPL-MCNC: 0.2 MG/DL (ref 0.1–1)
BUN SERPL-MCNC: 17 MG/DL (ref 6–20)
CALCIUM SERPL-MCNC: 8.6 MG/DL (ref 8.7–10.5)
CHLORIDE SERPL-SCNC: 100 MMOL/L (ref 95–110)
CO2 SERPL-SCNC: 33 MMOL/L (ref 23–29)
CREAT SERPL-MCNC: 1.2 MG/DL (ref 0.5–1.4)
EST. GFR  (NO RACE VARIABLE): >60 ML/MIN/1.73 M^2
GLUCOSE SERPL-MCNC: 236 MG/DL (ref 70–110)
POTASSIUM SERPL-SCNC: 3.7 MMOL/L (ref 3.5–5.1)
PROT SERPL-MCNC: 7 G/DL (ref 6–8.4)
SODIUM SERPL-SCNC: 140 MMOL/L (ref 136–145)
VANCOMYCIN TROUGH SERPL-MCNC: 10.9 UG/ML (ref 10–22)

## 2022-11-05 PROCEDURE — 80202 ASSAY OF VANCOMYCIN: CPT | Performed by: PHYSICIAN ASSISTANT

## 2022-11-05 PROCEDURE — 80053 COMPREHEN METABOLIC PANEL: CPT | Performed by: PHYSICIAN ASSISTANT

## 2022-11-07 ENCOUNTER — EXTERNAL HOME HEALTH (OUTPATIENT)
Dept: HOME HEALTH SERVICES | Facility: HOSPITAL | Age: 42
End: 2022-11-07
Payer: MEDICARE

## 2022-11-07 ENCOUNTER — TELEPHONE (OUTPATIENT)
Dept: INFECTIOUS DISEASES | Facility: CLINIC | Age: 42
End: 2022-11-07
Payer: MEDICARE

## 2022-11-07 DIAGNOSIS — M00.9 PYOGENIC ARTHRITIS, UPPER ARM: Primary | ICD-10-CM

## 2022-11-07 NOTE — TELEPHONE ENCOUNTER
----- Message from Maicol Arguelles sent at 11/5/2022 12:59 AM CDT -----  Regarding: Client Service  Contact: [phone]  Good Morning,     My name is Maicol Arguelles, I work in the Lab Client Services. We had a problem with some lab work on this patient. If someone from your office could call us at [phone] or [phone] that would be great. Anyone in my department can help.      Thank you,

## 2022-11-08 ENCOUNTER — LAB VISIT (OUTPATIENT)
Dept: LAB | Facility: HOSPITAL | Age: 42
End: 2022-11-08
Attending: PHYSICIAN ASSISTANT
Payer: MEDICARE

## 2022-11-08 DIAGNOSIS — M00.9 PYOGENIC ARTHRITIS, UPPER ARM: Primary | ICD-10-CM

## 2022-11-08 LAB
ALBUMIN SERPL BCP-MCNC: 2.8 G/DL (ref 3.5–5.2)
ALP SERPL-CCNC: 117 U/L (ref 55–135)
ALT SERPL W/O P-5'-P-CCNC: 31 U/L (ref 10–44)
ANION GAP SERPL CALC-SCNC: 16 MMOL/L (ref 8–16)
AST SERPL-CCNC: 26 U/L (ref 10–40)
BASOPHILS # BLD AUTO: 0.05 K/UL (ref 0–0.2)
BASOPHILS NFR BLD: 0.8 % (ref 0–1.9)
BILIRUB SERPL-MCNC: 0.2 MG/DL (ref 0.1–1)
BUN SERPL-MCNC: 13 MG/DL (ref 6–20)
CALCIUM SERPL-MCNC: 8.8 MG/DL (ref 8.7–10.5)
CHLORIDE SERPL-SCNC: 96 MMOL/L (ref 95–110)
CO2 SERPL-SCNC: 30 MMOL/L (ref 23–29)
CREAT SERPL-MCNC: 2 MG/DL (ref 0.5–1.4)
CRP SERPL-MCNC: 66.1 MG/L (ref 0–8.2)
DIFFERENTIAL METHOD: ABNORMAL
EOSINOPHIL # BLD AUTO: 0.7 K/UL (ref 0–0.5)
EOSINOPHIL NFR BLD: 11.6 % (ref 0–8)
ERYTHROCYTE [DISTWIDTH] IN BLOOD BY AUTOMATED COUNT: 14.7 % (ref 11.5–14.5)
EST. GFR  (NO RACE VARIABLE): 42.2 ML/MIN/1.73 M^2
GLUCOSE SERPL-MCNC: 142 MG/DL (ref 70–110)
HCT VFR BLD AUTO: 33 % (ref 40–54)
HGB BLD-MCNC: 9.7 G/DL (ref 14–18)
IMM GRANULOCYTES # BLD AUTO: 0.02 K/UL (ref 0–0.04)
IMM GRANULOCYTES NFR BLD AUTO: 0.3 % (ref 0–0.5)
LYMPHOCYTES # BLD AUTO: 1.1 K/UL (ref 1–4.8)
LYMPHOCYTES NFR BLD: 17 % (ref 18–48)
MCH RBC QN AUTO: 26.1 PG (ref 27–31)
MCHC RBC AUTO-ENTMCNC: 29.4 G/DL (ref 32–36)
MCV RBC AUTO: 89 FL (ref 82–98)
MONOCYTES # BLD AUTO: 0.6 K/UL (ref 0.3–1)
MONOCYTES NFR BLD: 9.7 % (ref 4–15)
NEUTROPHILS # BLD AUTO: 3.8 K/UL (ref 1.8–7.7)
NEUTROPHILS NFR BLD: 60.6 % (ref 38–73)
NRBC BLD-RTO: 0 /100 WBC
PLATELET # BLD AUTO: 197 K/UL (ref 150–450)
PMV BLD AUTO: 11.8 FL (ref 9.2–12.9)
POTASSIUM SERPL-SCNC: 4.1 MMOL/L (ref 3.5–5.1)
PROT SERPL-MCNC: 7 G/DL (ref 6–8.4)
RBC # BLD AUTO: 3.71 M/UL (ref 4.6–6.2)
SODIUM SERPL-SCNC: 142 MMOL/L (ref 136–145)
VANCOMYCIN TROUGH SERPL-MCNC: 21.6 UG/ML (ref 10–22)
WBC # BLD AUTO: 6.29 K/UL (ref 3.9–12.7)

## 2022-11-08 PROCEDURE — 85025 COMPLETE CBC W/AUTO DIFF WBC: CPT | Performed by: PHYSICIAN ASSISTANT

## 2022-11-08 PROCEDURE — 86140 C-REACTIVE PROTEIN: CPT | Performed by: PHYSICIAN ASSISTANT

## 2022-11-08 PROCEDURE — 80053 COMPREHEN METABOLIC PANEL: CPT | Performed by: PHYSICIAN ASSISTANT

## 2022-11-08 PROCEDURE — 80202 ASSAY OF VANCOMYCIN: CPT | Performed by: PHYSICIAN ASSISTANT

## 2022-11-09 ENCOUNTER — LAB VISIT (OUTPATIENT)
Dept: LAB | Facility: OTHER | Age: 42
End: 2022-11-09
Attending: INTERNAL MEDICINE
Payer: MEDICARE

## 2022-11-09 DIAGNOSIS — M00.9 PYOGENIC ARTHRITIS, UPPER ARM: Primary | ICD-10-CM

## 2022-11-09 LAB
ALBUMIN SERPL BCP-MCNC: 2.9 G/DL (ref 3.5–5.2)
ALP SERPL-CCNC: 134 U/L (ref 55–135)
ALT SERPL W/O P-5'-P-CCNC: 32 U/L (ref 10–44)
ANION GAP SERPL CALC-SCNC: 9 MMOL/L (ref 8–16)
AST SERPL-CCNC: 23 U/L (ref 10–40)
BASOPHILS # BLD AUTO: 0.04 K/UL (ref 0–0.2)
BASOPHILS NFR BLD: 0.8 % (ref 0–1.9)
BILIRUB SERPL-MCNC: 0.1 MG/DL (ref 0.1–1)
BUN SERPL-MCNC: 31 MG/DL (ref 6–20)
CALCIUM SERPL-MCNC: 8.9 MG/DL (ref 8.7–10.5)
CHLORIDE SERPL-SCNC: 100 MMOL/L (ref 95–110)
CO2 SERPL-SCNC: 31 MMOL/L (ref 23–29)
CREAT SERPL-MCNC: 1.4 MG/DL (ref 0.5–1.4)
CRP SERPL-MCNC: 37.6 MG/L (ref 0–8.2)
DIFFERENTIAL METHOD: ABNORMAL
EOSINOPHIL # BLD AUTO: 0.6 K/UL (ref 0–0.5)
EOSINOPHIL NFR BLD: 12 % (ref 0–8)
ERYTHROCYTE [DISTWIDTH] IN BLOOD BY AUTOMATED COUNT: 14 % (ref 11.5–14.5)
EST. GFR  (NO RACE VARIABLE): >60 ML/MIN/1.73 M^2
GLUCOSE SERPL-MCNC: 248 MG/DL (ref 70–110)
HCT VFR BLD AUTO: 32.9 % (ref 40–54)
HGB BLD-MCNC: 10 G/DL (ref 14–18)
IMM GRANULOCYTES # BLD AUTO: 0.01 K/UL (ref 0–0.04)
IMM GRANULOCYTES NFR BLD AUTO: 0.2 % (ref 0–0.5)
LYMPHOCYTES # BLD AUTO: 1.3 K/UL (ref 1–4.8)
LYMPHOCYTES NFR BLD: 24.5 % (ref 18–48)
MCH RBC QN AUTO: 26.2 PG (ref 27–31)
MCHC RBC AUTO-ENTMCNC: 30.4 G/DL (ref 32–36)
MCV RBC AUTO: 86 FL (ref 82–98)
MONOCYTES # BLD AUTO: 0.3 K/UL (ref 0.3–1)
MONOCYTES NFR BLD: 4.8 % (ref 4–15)
NEUTROPHILS # BLD AUTO: 3 K/UL (ref 1.8–7.7)
NEUTROPHILS NFR BLD: 57.7 % (ref 38–73)
NRBC BLD-RTO: 0 /100 WBC
PLATELET # BLD AUTO: 268 K/UL (ref 150–450)
PMV BLD AUTO: 11.4 FL (ref 9.2–12.9)
POTASSIUM SERPL-SCNC: 4 MMOL/L (ref 3.5–5.1)
PROT SERPL-MCNC: 7.2 G/DL (ref 6–8.4)
RBC # BLD AUTO: 3.82 M/UL (ref 4.6–6.2)
SODIUM SERPL-SCNC: 140 MMOL/L (ref 136–145)
VANCOMYCIN TROUGH SERPL-MCNC: 9.8 UG/ML (ref 10–22)
WBC # BLD AUTO: 5.23 K/UL (ref 3.9–12.7)

## 2022-11-09 PROCEDURE — 80053 COMPREHEN METABOLIC PANEL: CPT | Performed by: INTERNAL MEDICINE

## 2022-11-09 PROCEDURE — 80202 ASSAY OF VANCOMYCIN: CPT | Performed by: INTERNAL MEDICINE

## 2022-11-09 PROCEDURE — 85025 COMPLETE CBC W/AUTO DIFF WBC: CPT | Performed by: INTERNAL MEDICINE

## 2022-11-09 PROCEDURE — 86140 C-REACTIVE PROTEIN: CPT | Performed by: INTERNAL MEDICINE

## 2022-11-10 LAB — FUNGUS SPEC CULT: NORMAL

## 2022-11-11 ENCOUNTER — DOCUMENT SCAN (OUTPATIENT)
Dept: HOME HEALTH SERVICES | Facility: HOSPITAL | Age: 42
End: 2022-11-11

## 2022-11-11 ENCOUNTER — DOCUMENT SCAN (OUTPATIENT)
Dept: HOME HEALTH SERVICES | Facility: HOSPITAL | Age: 42
End: 2022-11-11
Payer: MEDICARE

## 2022-11-14 ENCOUNTER — LAB VISIT (OUTPATIENT)
Dept: LAB | Facility: HOSPITAL | Age: 42
End: 2022-11-14
Attending: PHYSICIAN ASSISTANT
Payer: MEDICARE

## 2022-11-14 DIAGNOSIS — M85.80 SAPHO SYNDROME: ICD-10-CM

## 2022-11-14 DIAGNOSIS — L40.3 SAPHO SYNDROME: ICD-10-CM

## 2022-11-14 DIAGNOSIS — M00.9 PYOGENIC ARTHRITIS, UPPER ARM: Primary | ICD-10-CM

## 2022-11-14 DIAGNOSIS — M65.9 SAPHO SYNDROME: ICD-10-CM

## 2022-11-14 DIAGNOSIS — M86.9 SAPHO SYNDROME: ICD-10-CM

## 2022-11-14 DIAGNOSIS — L70.9 SAPHO SYNDROME: ICD-10-CM

## 2022-11-14 LAB
ALBUMIN SERPL BCP-MCNC: 3.4 G/DL (ref 3.5–5.2)
ALP SERPL-CCNC: 140 U/L (ref 55–135)
ALT SERPL W/O P-5'-P-CCNC: 34 U/L (ref 10–44)
ANION GAP SERPL CALC-SCNC: 16 MMOL/L (ref 8–16)
AST SERPL-CCNC: 26 U/L (ref 10–40)
BASOPHILS # BLD AUTO: 0.08 K/UL (ref 0–0.2)
BASOPHILS NFR BLD: 0.9 % (ref 0–1.9)
BILIRUB SERPL-MCNC: 0.2 MG/DL (ref 0.1–1)
BUN SERPL-MCNC: 24 MG/DL (ref 6–20)
CALCIUM SERPL-MCNC: 9.5 MG/DL (ref 8.7–10.5)
CHLORIDE SERPL-SCNC: 94 MMOL/L (ref 95–110)
CO2 SERPL-SCNC: 27 MMOL/L (ref 23–29)
CREAT SERPL-MCNC: 1.4 MG/DL (ref 0.5–1.4)
CRP SERPL-MCNC: 18.2 MG/L (ref 0–8.2)
DIFFERENTIAL METHOD: ABNORMAL
EOSINOPHIL # BLD AUTO: 1.1 K/UL (ref 0–0.5)
EOSINOPHIL NFR BLD: 11.6 % (ref 0–8)
ERYTHROCYTE [DISTWIDTH] IN BLOOD BY AUTOMATED COUNT: 14.6 % (ref 11.5–14.5)
EST. GFR  (NO RACE VARIABLE): >60 ML/MIN/1.73 M^2
GLUCOSE SERPL-MCNC: 111 MG/DL (ref 70–110)
HCT VFR BLD AUTO: 36.8 % (ref 40–54)
HGB BLD-MCNC: 11 G/DL (ref 14–18)
IMM GRANULOCYTES # BLD AUTO: 0.05 K/UL (ref 0–0.04)
IMM GRANULOCYTES NFR BLD AUTO: 0.5 % (ref 0–0.5)
LYMPHOCYTES # BLD AUTO: 1.3 K/UL (ref 1–4.8)
LYMPHOCYTES NFR BLD: 13.9 % (ref 18–48)
MCH RBC QN AUTO: 26.4 PG (ref 27–31)
MCHC RBC AUTO-ENTMCNC: 29.9 G/DL (ref 32–36)
MCV RBC AUTO: 88 FL (ref 82–98)
MONOCYTES # BLD AUTO: 1 K/UL (ref 0.3–1)
MONOCYTES NFR BLD: 10.8 % (ref 4–15)
NEUTROPHILS # BLD AUTO: 5.7 K/UL (ref 1.8–7.7)
NEUTROPHILS NFR BLD: 62.3 % (ref 38–73)
NRBC BLD-RTO: 0 /100 WBC
PLATELET # BLD AUTO: 323 K/UL (ref 150–450)
PMV BLD AUTO: 11.5 FL (ref 9.2–12.9)
POTASSIUM SERPL-SCNC: 4.5 MMOL/L (ref 3.5–5.1)
PROT SERPL-MCNC: 8.1 G/DL (ref 6–8.4)
RBC # BLD AUTO: 4.17 M/UL (ref 4.6–6.2)
SODIUM SERPL-SCNC: 137 MMOL/L (ref 136–145)
VANCOMYCIN TROUGH SERPL-MCNC: 9.4 UG/ML (ref 10–22)
WBC # BLD AUTO: 9.22 K/UL (ref 3.9–12.7)

## 2022-11-14 PROCEDURE — 85025 COMPLETE CBC W/AUTO DIFF WBC: CPT | Performed by: PHYSICIAN ASSISTANT

## 2022-11-14 PROCEDURE — 80202 ASSAY OF VANCOMYCIN: CPT | Performed by: PHYSICIAN ASSISTANT

## 2022-11-14 PROCEDURE — 80053 COMPREHEN METABOLIC PANEL: CPT | Performed by: PHYSICIAN ASSISTANT

## 2022-11-14 PROCEDURE — 86140 C-REACTIVE PROTEIN: CPT | Performed by: PHYSICIAN ASSISTANT

## 2022-11-17 ENCOUNTER — LAB VISIT (OUTPATIENT)
Dept: LAB | Facility: HOSPITAL | Age: 42
End: 2022-11-17
Attending: PHYSICIAN ASSISTANT
Payer: MEDICARE

## 2022-11-17 DIAGNOSIS — L40.3 SAPHO SYNDROME: ICD-10-CM

## 2022-11-17 DIAGNOSIS — M86.9 SAPHO SYNDROME: ICD-10-CM

## 2022-11-17 DIAGNOSIS — M65.9 SAPHO SYNDROME: ICD-10-CM

## 2022-11-17 DIAGNOSIS — M85.80 SAPHO SYNDROME: ICD-10-CM

## 2022-11-17 DIAGNOSIS — M00.9 PYOGENIC ARTHRITIS, UPPER ARM: Primary | ICD-10-CM

## 2022-11-17 DIAGNOSIS — L70.9 SAPHO SYNDROME: ICD-10-CM

## 2022-11-17 LAB
ANION GAP SERPL CALC-SCNC: 13 MMOL/L (ref 8–16)
BUN SERPL-MCNC: 29 MG/DL (ref 6–20)
CALCIUM SERPL-MCNC: 9.4 MG/DL (ref 8.7–10.5)
CHLORIDE SERPL-SCNC: 100 MMOL/L (ref 95–110)
CO2 SERPL-SCNC: 26 MMOL/L (ref 23–29)
CREAT SERPL-MCNC: 1.5 MG/DL (ref 0.5–1.4)
EST. GFR  (NO RACE VARIABLE): 59.6 ML/MIN/1.73 M^2
GLUCOSE SERPL-MCNC: 233 MG/DL (ref 70–110)
POTASSIUM SERPL-SCNC: 4.8 MMOL/L (ref 3.5–5.1)
SODIUM SERPL-SCNC: 139 MMOL/L (ref 136–145)
VANCOMYCIN TROUGH SERPL-MCNC: 15.4 UG/ML (ref 10–22)

## 2022-11-17 PROCEDURE — 36415 COLL VENOUS BLD VENIPUNCTURE: CPT | Performed by: PHYSICIAN ASSISTANT

## 2022-11-17 PROCEDURE — 80202 ASSAY OF VANCOMYCIN: CPT | Performed by: PHYSICIAN ASSISTANT

## 2022-11-17 PROCEDURE — 80048 BASIC METABOLIC PNL TOTAL CA: CPT | Performed by: PHYSICIAN ASSISTANT

## 2022-11-21 ENCOUNTER — LAB VISIT (OUTPATIENT)
Dept: LAB | Facility: HOSPITAL | Age: 42
End: 2022-11-21
Attending: PHYSICIAN ASSISTANT
Payer: MEDICARE

## 2022-11-21 DIAGNOSIS — M00.9: Primary | ICD-10-CM

## 2022-11-21 PROCEDURE — 36415 COLL VENOUS BLD VENIPUNCTURE: CPT | Performed by: PHYSICIAN ASSISTANT

## 2022-11-21 PROCEDURE — 80202 ASSAY OF VANCOMYCIN: CPT | Performed by: PHYSICIAN ASSISTANT

## 2022-11-21 PROCEDURE — 85025 COMPLETE CBC W/AUTO DIFF WBC: CPT | Performed by: PHYSICIAN ASSISTANT

## 2022-11-22 ENCOUNTER — OFFICE VISIT (OUTPATIENT)
Dept: INFECTIOUS DISEASES | Facility: CLINIC | Age: 42
End: 2022-11-22
Payer: MEDICARE

## 2022-11-22 ENCOUNTER — INFUSION (OUTPATIENT)
Dept: INFECTIOUS DISEASES | Facility: HOSPITAL | Age: 42
End: 2022-11-22
Attending: PHYSICIAN ASSISTANT
Payer: MEDICARE

## 2022-11-22 ENCOUNTER — OFFICE VISIT (OUTPATIENT)
Dept: ORTHOPEDICS | Facility: CLINIC | Age: 42
End: 2022-11-22
Payer: MEDICARE

## 2022-11-22 ENCOUNTER — HOSPITAL ENCOUNTER (OUTPATIENT)
Dept: RADIOLOGY | Facility: HOSPITAL | Age: 42
Discharge: HOME | End: 2022-11-22
Attending: PHYSICIAN ASSISTANT
Payer: MEDICARE

## 2022-11-22 VITALS
BODY MASS INDEX: 20.01 KG/M2 | HEIGHT: 70 IN | DIASTOLIC BLOOD PRESSURE: 81 MMHG | TEMPERATURE: 98 F | BODY MASS INDEX: 21.59 KG/M2 | HEIGHT: 70 IN | SYSTOLIC BLOOD PRESSURE: 135 MMHG | WEIGHT: 139.75 LBS | HEART RATE: 68 BPM | WEIGHT: 150.81 LBS

## 2022-11-22 DIAGNOSIS — M00.9 PYOGENIC ARTHRITIS OF LEFT SHOULDER REGION, DUE TO UNSPECIFIED ORGANISM: Primary | ICD-10-CM

## 2022-11-22 DIAGNOSIS — Z51.81 THERAPEUTIC DRUG MONITORING: ICD-10-CM

## 2022-11-22 DIAGNOSIS — M00.9 PYOGENIC ARTHRITIS OF LEFT SHOULDER REGION, DUE TO UNSPECIFIED ORGANISM: ICD-10-CM

## 2022-11-22 LAB
BASOPHILS # BLD AUTO: 0.12 K/UL (ref 0–0.2)
BASOPHILS NFR BLD: 1.8 % (ref 0–1.9)
DIFFERENTIAL METHOD: ABNORMAL
EOSINOPHIL # BLD AUTO: 0.9 K/UL (ref 0–0.5)
EOSINOPHIL NFR BLD: 13.7 % (ref 0–8)
ERYTHROCYTE [DISTWIDTH] IN BLOOD BY AUTOMATED COUNT: 14.7 % (ref 11.5–14.5)
HCT VFR BLD AUTO: 39.5 % (ref 40–54)
HGB BLD-MCNC: 11.5 G/DL (ref 14–18)
IMM GRANULOCYTES # BLD AUTO: 0.02 K/UL (ref 0–0.04)
IMM GRANULOCYTES NFR BLD AUTO: 0.3 % (ref 0–0.5)
LYMPHOCYTES # BLD AUTO: 1.5 K/UL (ref 1–4.8)
LYMPHOCYTES NFR BLD: 22.5 % (ref 18–48)
MCH RBC QN AUTO: 25.7 PG (ref 27–31)
MCHC RBC AUTO-ENTMCNC: 29.1 G/DL (ref 32–36)
MCV RBC AUTO: 88 FL (ref 82–98)
MONOCYTES # BLD AUTO: 0.7 K/UL (ref 0.3–1)
MONOCYTES NFR BLD: 10.1 % (ref 4–15)
NEUTROPHILS # BLD AUTO: 3.5 K/UL (ref 1.8–7.7)
NEUTROPHILS NFR BLD: 51.6 % (ref 38–73)
NRBC BLD-RTO: 0 /100 WBC
PLATELET # BLD AUTO: 296 K/UL (ref 150–450)
PMV BLD AUTO: 12.1 FL (ref 9.2–12.9)
RBC # BLD AUTO: 4.47 M/UL (ref 4.6–6.2)
VANCOMYCIN TROUGH SERPL-MCNC: 11.5 UG/ML (ref 10–22)
WBC # BLD AUTO: 6.72 K/UL (ref 3.9–12.7)

## 2022-11-22 PROCEDURE — 99999 PR PBB SHADOW E&M-EST. PATIENT-LVL II: CPT | Mod: PBBFAC,,, | Performed by: PHYSICIAN ASSISTANT

## 2022-11-22 PROCEDURE — 99024 POSTOP FOLLOW-UP VISIT: CPT | Mod: POP,,, | Performed by: PHYSICIAN ASSISTANT

## 2022-11-22 PROCEDURE — 99212 OFFICE O/P EST SF 10 MIN: CPT | Mod: PBBFAC,27 | Performed by: PHYSICIAN ASSISTANT

## 2022-11-22 PROCEDURE — 73030 X-RAY EXAM OF SHOULDER: CPT | Mod: TC,LT

## 2022-11-22 PROCEDURE — 99213 OFFICE O/P EST LOW 20 MIN: CPT | Mod: 25,PBBFAC | Performed by: PHYSICIAN ASSISTANT

## 2022-11-22 PROCEDURE — 99999 PR PBB SHADOW E&M-EST. PATIENT-LVL II: ICD-10-PCS | Mod: PBBFAC,,, | Performed by: PHYSICIAN ASSISTANT

## 2022-11-22 PROCEDURE — 73030 X-RAY EXAM OF SHOULDER: CPT | Mod: 26,LT,, | Performed by: RADIOLOGY

## 2022-11-22 PROCEDURE — 99999 PR PBB SHADOW E&M-EST. PATIENT-LVL III: ICD-10-PCS | Mod: PBBFAC,,, | Performed by: PHYSICIAN ASSISTANT

## 2022-11-22 PROCEDURE — 99214 OFFICE O/P EST MOD 30 MIN: CPT | Mod: S$PBB,,, | Performed by: PHYSICIAN ASSISTANT

## 2022-11-22 PROCEDURE — 99214 PR OFFICE/OUTPT VISIT, EST, LEVL IV, 30-39 MIN: ICD-10-PCS | Mod: S$PBB,,, | Performed by: PHYSICIAN ASSISTANT

## 2022-11-22 PROCEDURE — 73030 XR SHOULDER COMPLETE 2 OR MORE VIEWS LEFT: ICD-10-PCS | Mod: 26,LT,, | Performed by: RADIOLOGY

## 2022-11-22 PROCEDURE — 99024 PR POST-OP FOLLOW-UP VISIT: ICD-10-PCS | Mod: POP,,, | Performed by: PHYSICIAN ASSISTANT

## 2022-11-22 PROCEDURE — 99999 PR PBB SHADOW E&M-EST. PATIENT-LVL III: CPT | Mod: PBBFAC,,, | Performed by: PHYSICIAN ASSISTANT

## 2022-11-22 NOTE — PROGRESS NOTES
No blood return from PICC, pt sent to lab, PICC line removed per MD order, measured tip @ 39cm, CDI, pt observed for 30 min, pt tolerated well, vaseline gauze and 2x2 gauze applied, wrapped with coflex, pt instructed to keep dressing on & dry for 24 hrs then remove, monitor for s/s of infection & notify MD, pt verbalized understanding of all above & left in NAD

## 2022-11-22 NOTE — PROGRESS NOTES
Subjective:       Patient ID: Pavel Field is a 42 y.o. male.    Chief Complaint: Follow-up    HPI    43 y/o male with poorly controlled DM, (HgbA1c 11%), HTN, HLD, seizures, developmental delay presents with left shoulder pain x 2 weeks. ID consulted for septic arthritis and osteomyelitis of left shoulder. S/p I&D with orthopedics 10/11. Cultures NGTD. Gram stain negative. Pt was discharged on vancomycin and ceftriaxone x 6 weeks EOC 11/22/22. Seen with caretaker at bedside. Picc line not able to draw blood, abx infusion without difficulty. Moving his shoulder much better. Saw ortho this AM. No further follow ups needed. He has full ROM of his left shoulder. No issues with his abx. Se[facility]aker at [facility]. Had n/v yesterday, one episode. No diarrhea,dyuria joint pains. No chills or night sweats.     Review of Systems   Constitutional:  Negative for activity change, appetite change, chills, diaphoresis, fatigue and fever.   Respiratory:  Negative for cough and shortness of breath.    Gastrointestinal:  Positive for nausea and vomiting. Negative for abdominal pain and diarrhea.   Genitourinary:  Negative for dysuria.   Musculoskeletal:  Negative for arthralgias, back pain and joint swelling.   Integumentary:  Negative for rash.   Neurological:  Negative for dizziness and headaches.       Objective:      Physical Exam  Vitals and nursing note reviewed.   Constitutional:       General: He is not in acute distress.     Appearance: He is well-developed. He is not diaphoretic.   HENT:      Head: Normocephalic and atraumatic.   Eyes:      Pupils: Pupils are equal, round, and reactive to light.   Cardiovascular:      Rate and Rhythm: Normal rate and regular rhythm.      Heart sounds: Normal heart sounds. No murmur heard.    No friction rub. No gallop.   Pulmonary:      Effort: Pulmonary effort is normal. No respiratory distress.      Breath sounds: Normal breath sounds. No wheezing or rales.   Chest:       Chest wall: No tenderness.   Abdominal:      General: Bowel sounds are normal. There is no distension.      Palpations: There is no mass.      Tenderness: There is no abdominal tenderness. There is no guarding.   Musculoskeletal:         General: No swelling, tenderness or deformity.      Cervical back: Normal range of motion and neck supple.   Skin:     General: Skin is warm and dry.      Findings: No erythema or rash.      Comments: Picc line stable  Eczema of upper extremities    Neurological:      Mental Status: He is alert and oriented to person, place, and time.   Psychiatric:         Behavior: Behavior normal.         Thought Content: Thought content normal.         Judgment: Judgment normal.       Assessment:       Problem List Items Addressed This Visit          Other    Therapeutic drug monitoring    Relevant Orders    C-reactive protein    Comprehensive Metabolic Panel    Nursing communication       Plan:           Cath flow today  Crp cmp today  Remove picc line today  No further abx at this time  F/u dermatology for eczema  RTC as needed    All questions answered. Discussed with caretaker       >35 minutes of total time spent on the encounter, which includes face to face time and non-face to face time preparing to see the patient (eg, review of tests), Obtaining and/or reviewing separately obtained history, Documenting clinical information in the electronic or other health record, Independently interpreting results (not separately reported) and communicating results to the patient/family/caregiver, or Care coordination (not separately reported).

## 2022-11-22 NOTE — PROGRESS NOTES
Principal Orthopedic Problem: septic arthritis shoulder     Relevant Medical History: according to chart    PMHX:  DM, (HgbA1c 11%), HTN, HLD, seizures, developmental delay presents     HPI: Mr. Field is 42 year old male who presented to the Ed on 10/11/22 with left shoulder pain after fall. Initial imaging in the ED showed a possible L shoulder subluxation and reduction was attempted by the ED without success. Orthopedics was then consulted for this. Inflammatory markers were elevated and on exam he has a large shoulder effusion.  , , WBC 8.5.   RADS: Possible anterior subluxation of the left shoulder with a large joint effusion.   Lab Results   Component Value Date     WBCBF 521696 10/11/2022     SEGS 94 10/11/2022     CRYST Negative 10/11/2022     BODYFLUIDTYP Joint Fluid Left Shoulder      10/11/22: :  Arthroscopic irrigation and synovectomy, major left shoulder - 92528  Athroscopic irrigation left subacromial and subdeltoid bursa with bursectomy  Incision of bone and Curettage of left humeral head for osteomyelitis     Per ID  MICRO: NGTD  vancomycin and ceftriaxone  Estimated end date of IV antibiotics: 11/22/22      Mr. Field is here today for a post-operative visit    Interval History:  he reports that he is doing ok.   he is at home . he is  participating in PT/OT.   Pain is controled.  he is not taking pain medication.  Reports feeling better in his shoulder  he denies fever, chills, and sweats .   He has been given antibiotics as prescribed.    Physical exam:    Patient arrives to exam room: walked in.  Patient is accompanied by nurse from facility   Dressing taken down.  Incision is clean, dry and intact.  \   Healing well no signs of breakdown or infection. Full painless range of motion     RADS: All pertinent images were reviewed by myself:   No fracture or dislocation    Assessment:  Post-op visit ( 6weeks)    Plan:  Current care, treatment plan, precautions, activity level/  modifications, limitations, rehabilitation exercises and proposed future treatment were discussed with the patient. We discussed the need to monitor for changes in symptoms and condition and report them to the physician.  Discussed importance of compliance with all appointments and follow up examinations.     WOUND CARE ORDERS  Healing well        ACTIVITY:   - as tolerated   -range of motion as tolerated        PAIN MEDICATION:   - Multimodal pain control  - Pain medication: refill was not needed  - Pain medication refill policy provided to patient for review, yes.    - Patient was informed a multi-modal approach is used to treat their pain. With the goal to get off of narcotic pain medication and discontinue as soon as possible.   - ice and elevation to reduce pain and swelling     DVT PROPHYLAXIS:   - ASA 81 mg bid    OTHER:   Continue antibiotics as prescribed by ID      FOLLOW UP:   - Patient will follow up in the clinic prn will follow up with ID      If there are any questions prior to scheduled follow up, the patient was instructed to contact the office

## 2022-11-29 LAB
ACID FAST MOD KINY STN SPEC: NORMAL
MYCOBACTERIUM SPEC QL CULT: NORMAL

## 2022-12-05 ENCOUNTER — DOCUMENT SCAN (OUTPATIENT)
Dept: HOME HEALTH SERVICES | Facility: HOSPITAL | Age: 42
End: 2022-12-05
Payer: MEDICARE

## 2023-01-31 ENCOUNTER — TELEPHONE (OUTPATIENT)
Dept: ORTHOPEDICS | Facility: CLINIC | Age: 43
End: 2023-01-31
Payer: MEDICARE

## 2023-01-31 DIAGNOSIS — M00.9 PYOGENIC ARTHRITIS OF LEFT SHOULDER REGION, DUE TO UNSPECIFIED ORGANISM: Primary | ICD-10-CM

## 2023-01-31 NOTE — TELEPHONE ENCOUNTER
Spoke with pt. Regarding miss appointment on 1/31/23. Pt is reschedule to 2/14/23-MAILED. Patient states verbal understanding and has no further questions.

## 2023-02-15 ENCOUNTER — TELEPHONE (OUTPATIENT)
Dept: ORTHOPEDICS | Facility: CLINIC | Age: 43
End: 2023-02-15
Payer: MEDICARE

## 2023-02-15 NOTE — TELEPHONE ENCOUNTER
Spoke with Megan Anderson regarding pt missed appointment on 2/15/23. Pt is reschedule to 3/13/23 @ 7:15 am. Mail to: [address] [place] [address]. Done.

## 2023-03-13 ENCOUNTER — TELEPHONE (OUTPATIENT)
Dept: ORTHOPEDICS | Facility: CLINIC | Age: 43
End: 2023-03-13
Payer: MEDICARE

## 2023-03-13 NOTE — TELEPHONE ENCOUNTER
Pt is in a facility [phone]. Regarding 3rd  missed appointment 3/13/23. Pt need to reschedule his appointment.

## 2023-11-09 ENCOUNTER — HOSPITAL ENCOUNTER (EMERGENCY)
Facility: HOSPITAL | Age: 43
Discharge: HOME | End: 2023-11-09
Attending: EMERGENCY MEDICINE
Payer: MEDICARE

## 2023-11-09 VITALS
HEART RATE: 87 BPM | WEIGHT: 150 LBS | TEMPERATURE: 98 F | DIASTOLIC BLOOD PRESSURE: 91 MMHG | HEIGHT: 72 IN | RESPIRATION RATE: 16 BRPM | OXYGEN SATURATION: 100 % | BODY MASS INDEX: 20.32 KG/M2 | SYSTOLIC BLOOD PRESSURE: 137 MMHG

## 2023-11-09 DIAGNOSIS — M79.89 TOE SWELLING: Primary | ICD-10-CM

## 2023-11-09 PROCEDURE — 99281 EMR DPT VST MAYX REQ PHY/QHP: CPT

## 2023-11-09 NOTE — ED NOTES
"Pt presents to ED via personal transport w/ family members w/ c/o right great toe swelling. Pt states his toe has been swollen for ~1 week. Denies injury to the area. States he has been tested for gout "and it's not gout." Denies any pain to the area.    Patient identifiers for Pavel Field 43 y.o. male checked and correct.  Chief Complaint   Patient presents with    Toe Pain     States right great toe swollen and tender/ states PCP sent to ER for xray-- states was told "not gout"     Past Medical History:   Diagnosis Date    Anxiety     Depression     Diabetes mellitus     DKA (diabetic ketoacidoses)     Glaucoma     Hyperlipemia     Hypertension     Seizures      Allergies reported:   Review of patient's allergies indicates:   Allergen Reactions    Vitamin c, e, zinc, copper combination no.11 Hives     "

## 2023-11-09 NOTE — ED PROVIDER NOTES
Encounter Date: 11/9/2023       History     This history was obtained from the patient without limitations.    He is a 43-year-old with the below past medical history who presents by personal transportation.  He complains of swelling to the great toe of his right foot that was first noticed 4 days ago.  He denies trauma.  He denies pain.  He denies loss of motor function and sensation.  He denies discomfort with wearing shoes.  He is a diabetic but denies having neuropathy.        Review of patient's allergies indicates:   Allergen Reactions    Vitamin c, e, zinc, copper combination no.11 Hives     Past Medical History:   Diagnosis Date    Anxiety     Depression     Diabetes mellitus     DKA (diabetic ketoacidoses)     Glaucoma     Hyperlipemia     Hypertension     Seizures      Past Surgical History:   Procedure Laterality Date    ARTHROSCOPIC DEBRIDEMENT OF SHOULDER Left 10/11/2022    Procedure: DEBRIDEMENT, SHOULDER, ARTHROSCOPIC ;  Surgeon: Hayden Frye MD;  Location: [facility] OR [address] [place] [address];  Service: Orthopedics;  Laterality: Left;    EYE SURGERY      cataracts - left 2019    SYNOVECTOMY OF SHOULDER Left 10/11/2022    Procedure: SYNOVECTOMY, SHOULDER;  Surgeon: Hayden Frye MD;  Location: [facility] OR [address] [place] [address];  Service: Orthopedics;  Laterality: Left;     Family History   Problem Relation Age of Onset    Anal fissures Neg Hx      Social History     Tobacco Use    Smoking status: Never    Smokeless tobacco: Never   Substance Use Topics    Alcohol use: No    Drug use: No     Physical Exam     Initial Vitals [11/09/23 1241]   BP Pulse Resp Temp SpO2   (!) 137/91 87 16 98.3 °F (36.8 °C) 100 %      MAP       --         Physical Exam    Nursing note and vitals reviewed.  Constitutional: He is not diaphoretic. No distress.   Cardiovascular:            Pulses:       Dorsalis pedis pulses are 2+ on the right side.        Posterior tibial pulses are 2+ on the right side.   Musculoskeletal:      Right foot: Normal range of  motion and normal capillary refill. Swelling present. No tenderness or bony tenderness.      Comments: There is isolated swelling to the great toe of the right foot.     Skin:   There is no erythema to the right great toe.  There is no bleeding or drainage from the cuticle.         ED Course   Procedures  Labs Reviewed - No data to display       Imaging Results    None          Medications - No data to display  Medical Decision Making                             Clinical Impression:   Final diagnoses:  [M79.89] Toe swelling (Primary)        ED Disposition Condition    Discharge Stable          ED Prescriptions    None       Follow-up Information       Follow up With Specialties Details Why Contact Info    Ingrid Madrigal MD Cardiology In 1 week  [address] [facility] [id number]  [phone]      ER   Return to the ER for worsened symptoms or for any other concerns needing immediate attention.              Domingo Bonilla III, MD  11/09/23 0558

## 2023-11-09 NOTE — DISCHARGE INSTRUCTIONS
We know that you have many choices and are honored that you chose us. We hope that we met or exceeded your expectations and goals for this visit and will keep the [facility] family in mind for your future needs and those of your family and friends.     - Dr. Bonilla

## 2024-07-28 ENCOUNTER — HOSPITAL ENCOUNTER (INPATIENT)
Facility: HOSPITAL | Age: 44
LOS: 1 days | Discharge: HOME | DRG: 638 | End: 2024-07-29
Attending: EMERGENCY MEDICINE | Admitting: INTERNAL MEDICINE
Payer: MEDICARE

## 2024-07-28 DIAGNOSIS — N18.31 STAGE 3A CHRONIC KIDNEY DISEASE: ICD-10-CM

## 2024-07-28 DIAGNOSIS — E10.10 DIABETIC KETOACIDOSIS WITHOUT COMA ASSOCIATED WITH TYPE 1 DIABETES MELLITUS: Primary | ICD-10-CM

## 2024-07-28 DIAGNOSIS — I10 PRIMARY HYPERTENSION: ICD-10-CM

## 2024-07-28 DIAGNOSIS — G40.909 SEIZURE DISORDER: ICD-10-CM

## 2024-07-28 DIAGNOSIS — N17.9 AKI (ACUTE KIDNEY INJURY): ICD-10-CM

## 2024-07-28 DIAGNOSIS — E78.2 MIXED HYPERLIPIDEMIA: ICD-10-CM

## 2024-07-28 LAB
ALBUMIN SERPL BCP-MCNC: 4 G/DL (ref 3.5–5.2)
ALP SERPL-CCNC: 116 U/L (ref 55–135)
ALT SERPL W/O P-5'-P-CCNC: 38 U/L (ref 10–44)
AMPHET+METHAMPHET UR QL: NEGATIVE
ANION GAP SERPL CALC-SCNC: 12 MMOL/L (ref 8–16)
ANION GAP SERPL CALC-SCNC: 12 MMOL/L (ref 8–16)
ANION GAP SERPL CALC-SCNC: 17 MMOL/L (ref 8–16)
ANION GAP SERPL CALC-SCNC: 18 MMOL/L (ref 8–16)
AST SERPL-CCNC: 22 U/L (ref 10–40)
B-OH-BUTYR BLD STRIP-SCNC: 4.1 MMOL/L (ref 0–0.5)
BACTERIA #/AREA URNS HPF: ABNORMAL /HPF
BARBITURATES UR QL SCN>200 NG/ML: NEGATIVE
BASOPHILS # BLD AUTO: 0.06 K/UL (ref 0–0.2)
BASOPHILS NFR BLD: 0.5 % (ref 0–1.9)
BENZODIAZ UR QL SCN>200 NG/ML: NEGATIVE
BILIRUB SERPL-MCNC: 0.2 MG/DL (ref 0.1–1)
BILIRUB UR QL STRIP: ABNORMAL
BUN SERPL-MCNC: 52 MG/DL (ref 6–20)
BUN SERPL-MCNC: 53 MG/DL (ref 6–20)
BUN SERPL-MCNC: 54 MG/DL (ref 6–20)
BUN SERPL-MCNC: 56 MG/DL (ref 6–20)
BZE UR QL SCN: NEGATIVE
CALCIUM SERPL-MCNC: 9.2 MG/DL (ref 8.7–10.5)
CALCIUM SERPL-MCNC: 9.3 MG/DL (ref 8.7–10.5)
CALCIUM SERPL-MCNC: 9.4 MG/DL (ref 8.7–10.5)
CALCIUM SERPL-MCNC: 9.6 MG/DL (ref 8.7–10.5)
CANNABINOIDS UR QL SCN: NEGATIVE
CHLORIDE SERPL-SCNC: 103 MMOL/L (ref 95–110)
CHLORIDE SERPL-SCNC: 104 MMOL/L (ref 95–110)
CHLORIDE SERPL-SCNC: 104 MMOL/L (ref 95–110)
CHLORIDE SERPL-SCNC: 99 MMOL/L (ref 95–110)
CLARITY UR: ABNORMAL
CO2 SERPL-SCNC: 19 MMOL/L (ref 23–29)
CO2 SERPL-SCNC: 21 MMOL/L (ref 23–29)
CO2 SERPL-SCNC: 24 MMOL/L (ref 23–29)
CO2 SERPL-SCNC: 25 MMOL/L (ref 23–29)
COLOR UR: YELLOW
CREAT SERPL-MCNC: 2.7 MG/DL (ref 0.5–1.4)
CREAT SERPL-MCNC: 2.7 MG/DL (ref 0.5–1.4)
CREAT SERPL-MCNC: 3 MG/DL (ref 0.5–1.4)
CREAT SERPL-MCNC: 3.2 MG/DL (ref 0.5–1.4)
CREAT UR-MCNC: 179 MG/DL (ref 23–375)
CREAT UR-MCNC: 377.9 MG/DL (ref 23–375)
DIFFERENTIAL METHOD BLD: ABNORMAL
EOSINOPHIL # BLD AUTO: 0 K/UL (ref 0–0.5)
EOSINOPHIL NFR BLD: 0 % (ref 0–8)
ERYTHROCYTE [DISTWIDTH] IN BLOOD BY AUTOMATED COUNT: 13.1 % (ref 11.5–14.5)
EST. GFR  (NO RACE VARIABLE): 24 ML/MIN/1.73 M^2
EST. GFR  (NO RACE VARIABLE): 26 ML/MIN/1.73 M^2
EST. GFR  (NO RACE VARIABLE): 29 ML/MIN/1.73 M^2
EST. GFR  (NO RACE VARIABLE): 29 ML/MIN/1.73 M^2
FIO2: 21 %
GLUCOSE SERPL-MCNC: 106 MG/DL (ref 70–110)
GLUCOSE SERPL-MCNC: 118 MG/DL (ref 70–110)
GLUCOSE SERPL-MCNC: 161 MG/DL (ref 70–110)
GLUCOSE SERPL-MCNC: 341 MG/DL (ref 70–110)
GLUCOSE UR QL STRIP: ABNORMAL
HCT VFR BLD AUTO: 47.5 % (ref 40–54)
HCT VFR BLD CALC: 42.4 % (ref 36–54)
HGB BLD-MCNC: 13.8 G/DL (ref 9–18)
HGB BLD-MCNC: 15.4 G/DL (ref 14–18)
HGB UR QL STRIP: NEGATIVE
HYALINE CASTS #/AREA URNS LPF: 3 /LPF
IMM GRANULOCYTES # BLD AUTO: 0.04 K/UL (ref 0–0.04)
IMM GRANULOCYTES NFR BLD AUTO: 0.3 % (ref 0–0.5)
KETONES UR QL STRIP: ABNORMAL
LEUKOCYTE ESTERASE UR QL STRIP: NEGATIVE
LIPASE SERPL-CCNC: 6 U/L (ref 4–60)
LYMPHOCYTES # BLD AUTO: 0.9 K/UL (ref 1–4.8)
LYMPHOCYTES NFR BLD: 7 % (ref 18–48)
MAGNESIUM SERPL-MCNC: 2.2 MG/DL (ref 1.6–2.6)
MCH RBC QN AUTO: 26.6 PG (ref 27–31)
MCHC RBC AUTO-ENTMCNC: 32.4 G/DL (ref 32–36)
MCV RBC AUTO: 82 FL (ref 82–98)
METHADONE UR QL SCN>300 NG/ML: NEGATIVE
MICROSCOPIC COMMENT: ABNORMAL
MONOCYTES # BLD AUTO: 0.6 K/UL (ref 0.3–1)
MONOCYTES NFR BLD: 4.4 % (ref 4–15)
NEUTROPHILS # BLD AUTO: 11.6 K/UL (ref 1.8–7.7)
NEUTROPHILS NFR BLD: 87.8 % (ref 38–73)
NITRITE UR QL STRIP: NEGATIVE
NRBC BLD-RTO: 0 /100 WBC
OPIATES UR QL SCN: NEGATIVE
PCO2 BLDA: 45.5 MMHG (ref 35–45)
PCP UR QL SCN>25 NG/ML: NEGATIVE
PH SMN: 7.38 [PH] (ref 7.35–7.45)
PH UR STRIP: 5 [PH] (ref 5–8)
PLATELET # BLD AUTO: 266 K/UL (ref 150–450)
PMV BLD AUTO: 12.6 FL (ref 9.2–12.9)
PO2 BLDA: 39.7 MMHG (ref 40–60)
POC BASE DEFICIT: 1.3 MMOL/L (ref -2–2)
POC HCO3: 27 MMOL/L (ref 24–28)
POC IONIZED CALCIUM: 1.21 MMOL/L (ref 1.06–1.42)
POC PERFORMED BY: ABNORMAL
POC SATURATED O2: 73.1 % (ref 95–100)
POCT GLUCOSE: 105 MG/DL (ref 70–110)
POCT GLUCOSE: 120 MG/DL (ref 70–110)
POCT GLUCOSE: 133 MG/DL (ref 70–110)
POCT GLUCOSE: 140 MG/DL (ref 70–110)
POCT GLUCOSE: 147 MG/DL (ref 70–110)
POCT GLUCOSE: 157 MG/DL (ref 70–110)
POCT GLUCOSE: 216 MG/DL (ref 70–110)
POCT GLUCOSE: 250 MG/DL (ref 70–110)
POCT GLUCOSE: 306 MG/DL (ref 70–110)
POCT GLUCOSE: 309 MG/DL (ref 70–110)
POCT GLUCOSE: 320 MG/DL (ref 70–110)
POTASSIUM BLD-SCNC: 4 MMOL/L (ref 3.5–5.1)
POTASSIUM SERPL-SCNC: 4.3 MMOL/L (ref 3.5–5.1)
POTASSIUM SERPL-SCNC: 4.3 MMOL/L (ref 3.5–5.1)
POTASSIUM SERPL-SCNC: 4.4 MMOL/L (ref 3.5–5.1)
POTASSIUM SERPL-SCNC: 5.7 MMOL/L (ref 3.5–5.1)
PROT SERPL-MCNC: 8.5 G/DL (ref 6–8.4)
PROT UR QL STRIP: ABNORMAL
RBC # BLD AUTO: 5.8 M/UL (ref 4.6–6.2)
RBC #/AREA URNS HPF: 0 /HPF (ref 0–4)
SODIUM BLD-SCNC: 146 MMOL/L (ref 136–145)
SODIUM SERPL-SCNC: 136 MMOL/L (ref 136–145)
SODIUM SERPL-SCNC: 140 MMOL/L (ref 136–145)
SODIUM SERPL-SCNC: 140 MMOL/L (ref 136–145)
SODIUM SERPL-SCNC: 142 MMOL/L (ref 136–145)
SODIUM UR-SCNC: 40 MMOL/L (ref 20–250)
SP GR UR STRIP: 1.02 (ref 1–1.03)
SPECIMEN SOURCE: ABNORMAL
TOXICOLOGY INFORMATION: ABNORMAL
TROPONIN I SERPL DL<=0.01 NG/ML-MCNC: <0.006 NG/ML (ref 0–0.03)
URN SPEC COLLECT METH UR: ABNORMAL
UROBILINOGEN UR STRIP-ACNC: ABNORMAL EU/DL
UUN UR-MCNC: 1064 MG/DL (ref 140–1050)
WBC # BLD AUTO: 13.22 K/UL (ref 3.9–12.7)
WBC #/AREA URNS HPF: 5 /HPF (ref 0–5)
YEAST URNS QL MICRO: ABNORMAL

## 2024-07-28 PROCEDURE — 96366 THER/PROPH/DIAG IV INF ADDON: CPT

## 2024-07-28 PROCEDURE — 11000001 HC ACUTE MED/SURG PRIVATE ROOM

## 2024-07-28 PROCEDURE — 99900035 HC TECH TIME PER 15 MIN (STAT)

## 2024-07-28 PROCEDURE — 25000003 PHARM REV CODE 250: Performed by: EMERGENCY MEDICINE

## 2024-07-28 PROCEDURE — 63600175 PHARM REV CODE 636 W HCPCS

## 2024-07-28 PROCEDURE — 82010 KETONE BODYS QUAN: CPT | Performed by: EMERGENCY MEDICINE

## 2024-07-28 PROCEDURE — 80048 BASIC METABOLIC PNL TOTAL CA: CPT | Mod: 91,XB

## 2024-07-28 PROCEDURE — 25000003 PHARM REV CODE 250

## 2024-07-28 PROCEDURE — 96361 HYDRATE IV INFUSION ADD-ON: CPT

## 2024-07-28 PROCEDURE — 99291 CRITICAL CARE FIRST HOUR: CPT | Mod: 25

## 2024-07-28 PROCEDURE — 80048 BASIC METABOLIC PNL TOTAL CA: CPT | Mod: XB

## 2024-07-28 PROCEDURE — 85025 COMPLETE CBC W/AUTO DIFF WBC: CPT | Performed by: EMERGENCY MEDICINE

## 2024-07-28 PROCEDURE — 82570 ASSAY OF URINE CREATININE: CPT

## 2024-07-28 PROCEDURE — 84300 ASSAY OF URINE SODIUM: CPT

## 2024-07-28 PROCEDURE — 93010 ELECTROCARDIOGRAM REPORT: CPT | Mod: ,,, | Performed by: STUDENT IN AN ORGANIZED HEALTH CARE EDUCATION/TRAINING PROGRAM

## 2024-07-28 PROCEDURE — 84484 ASSAY OF TROPONIN QUANT: CPT | Performed by: EMERGENCY MEDICINE

## 2024-07-28 PROCEDURE — 96365 THER/PROPH/DIAG IV INF INIT: CPT

## 2024-07-28 PROCEDURE — 96375 TX/PRO/DX INJ NEW DRUG ADDON: CPT

## 2024-07-28 PROCEDURE — 81000 URINALYSIS NONAUTO W/SCOPE: CPT | Mod: 59 | Performed by: EMERGENCY MEDICINE

## 2024-07-28 PROCEDURE — 82803 BLOOD GASES ANY COMBINATION: CPT

## 2024-07-28 PROCEDURE — 84540 ASSAY OF URINE/UREA-N: CPT

## 2024-07-28 PROCEDURE — 94761 N-INVAS EAR/PLS OXIMETRY MLT: CPT | Mod: XB

## 2024-07-28 PROCEDURE — 80053 COMPREHEN METABOLIC PANEL: CPT | Performed by: EMERGENCY MEDICINE

## 2024-07-28 PROCEDURE — 82962 GLUCOSE BLOOD TEST: CPT

## 2024-07-28 PROCEDURE — 63600175 PHARM REV CODE 636 W HCPCS: Performed by: EMERGENCY MEDICINE

## 2024-07-28 PROCEDURE — 80307 DRUG TEST PRSMV CHEM ANLYZR: CPT | Performed by: EMERGENCY MEDICINE

## 2024-07-28 PROCEDURE — 93005 ELECTROCARDIOGRAM TRACING: CPT

## 2024-07-28 PROCEDURE — 83690 ASSAY OF LIPASE: CPT | Performed by: EMERGENCY MEDICINE

## 2024-07-28 PROCEDURE — S5010 5% DEXTROSE AND 0.45% SALINE: HCPCS

## 2024-07-28 PROCEDURE — 83735 ASSAY OF MAGNESIUM: CPT | Performed by: EMERGENCY MEDICINE

## 2024-07-28 RX ORDER — FENTANYL CITRATE 50 UG/ML
50 INJECTION, SOLUTION INTRAMUSCULAR; INTRAVENOUS
Status: COMPLETED | OUTPATIENT
Start: 2024-07-28 | End: 2024-07-28

## 2024-07-28 RX ORDER — DEXTROSE MONOHYDRATE 100 MG/ML
INJECTION, SOLUTION INTRAVENOUS
Status: DISCONTINUED | OUTPATIENT
Start: 2024-07-28 | End: 2024-07-29 | Stop reason: HOSPADM

## 2024-07-28 RX ORDER — INSULIN ASPART 100 [IU]/ML
0-10 INJECTION, SOLUTION INTRAVENOUS; SUBCUTANEOUS
Status: DISCONTINUED | OUTPATIENT
Start: 2024-07-28 | End: 2024-07-29 | Stop reason: HOSPADM

## 2024-07-28 RX ORDER — ACETAMINOPHEN 325 MG/1
650 TABLET ORAL EVERY 6 HOURS PRN
Status: DISCONTINUED | OUTPATIENT
Start: 2024-07-28 | End: 2024-07-29 | Stop reason: HOSPADM

## 2024-07-28 RX ORDER — DEXTROSE MONOHYDRATE 100 MG/ML
INJECTION, SOLUTION INTRAVENOUS
Status: DISCONTINUED | OUTPATIENT
Start: 2024-07-28 | End: 2024-07-28

## 2024-07-28 RX ORDER — VENLAFAXINE HYDROCHLORIDE 37.5 MG/1
150 CAPSULE, EXTENDED RELEASE ORAL DAILY
Status: DISCONTINUED | OUTPATIENT
Start: 2024-07-28 | End: 2024-07-29 | Stop reason: HOSPADM

## 2024-07-28 RX ORDER — IBUPROFEN 200 MG
16 TABLET ORAL
Status: DISCONTINUED | OUTPATIENT
Start: 2024-07-28 | End: 2024-07-29

## 2024-07-28 RX ORDER — ONDANSETRON HYDROCHLORIDE 2 MG/ML
4 INJECTION, SOLUTION INTRAVENOUS
Status: COMPLETED | OUTPATIENT
Start: 2024-07-28 | End: 2024-07-28

## 2024-07-28 RX ORDER — IBUPROFEN 200 MG
16 TABLET ORAL
Status: DISCONTINUED | OUTPATIENT
Start: 2024-07-28 | End: 2024-07-29 | Stop reason: HOSPADM

## 2024-07-28 RX ORDER — INSULIN ASPART 100 [IU]/ML
5 INJECTION, SOLUTION INTRAVENOUS; SUBCUTANEOUS
Status: DISCONTINUED | OUTPATIENT
Start: 2024-07-28 | End: 2024-07-29 | Stop reason: HOSPADM

## 2024-07-28 RX ORDER — GLUCAGON 1 MG
1 KIT INJECTION
Status: DISCONTINUED | OUTPATIENT
Start: 2024-07-28 | End: 2024-07-29

## 2024-07-28 RX ORDER — SODIUM CHLORIDE 9 MG/ML
1000 INJECTION, SOLUTION INTRAVENOUS CONTINUOUS
Status: DISCONTINUED | OUTPATIENT
Start: 2024-07-28 | End: 2024-07-28

## 2024-07-28 RX ORDER — GLUCAGON 1 MG
1 KIT INJECTION
Status: DISCONTINUED | OUTPATIENT
Start: 2024-07-28 | End: 2024-07-29 | Stop reason: HOSPADM

## 2024-07-28 RX ORDER — DEXTROSE MONOHYDRATE AND SODIUM CHLORIDE 5; .45 G/100ML; G/100ML
INJECTION, SOLUTION INTRAVENOUS CONTINUOUS PRN
Status: DISCONTINUED | OUTPATIENT
Start: 2024-07-28 | End: 2024-07-29

## 2024-07-28 RX ORDER — CETIRIZINE HYDROCHLORIDE 5 MG/1
10 TABLET ORAL DAILY
Status: DISCONTINUED | OUTPATIENT
Start: 2024-07-28 | End: 2024-07-29 | Stop reason: HOSPADM

## 2024-07-28 RX ORDER — ONDANSETRON HYDROCHLORIDE 2 MG/ML
4 INJECTION, SOLUTION INTRAVENOUS ONCE
Status: COMPLETED | OUTPATIENT
Start: 2024-07-28 | End: 2024-07-28

## 2024-07-28 RX ORDER — IBUPROFEN 200 MG
24 TABLET ORAL
Status: DISCONTINUED | OUTPATIENT
Start: 2024-07-28 | End: 2024-07-29 | Stop reason: HOSPADM

## 2024-07-28 RX ORDER — AMLODIPINE BESYLATE 2.5 MG/1
2.5 TABLET ORAL DAILY
Status: DISCONTINUED | OUTPATIENT
Start: 2024-07-28 | End: 2024-07-29 | Stop reason: HOSPADM

## 2024-07-28 RX ORDER — ENOXAPARIN SODIUM 100 MG/ML
30 INJECTION SUBCUTANEOUS EVERY 24 HOURS
Status: DISCONTINUED | OUTPATIENT
Start: 2024-07-28 | End: 2024-07-29

## 2024-07-28 RX ORDER — SODIUM CHLORIDE 0.9 % (FLUSH) 0.9 %
10 SYRINGE (ML) INJECTION
Status: DISCONTINUED | OUTPATIENT
Start: 2024-07-28 | End: 2024-07-29 | Stop reason: HOSPADM

## 2024-07-28 RX ORDER — INSULIN GLARGINE 100 [IU]/ML
10 INJECTION, SOLUTION SUBCUTANEOUS DAILY
Status: DISCONTINUED | OUTPATIENT
Start: 2024-07-28 | End: 2024-07-29 | Stop reason: HOSPADM

## 2024-07-28 RX ORDER — AMLODIPINE BESYLATE 2.5 MG/1
2.5 TABLET ORAL DAILY
COMMUNITY

## 2024-07-28 RX ORDER — ATORVASTATIN CALCIUM 40 MG/1
80 TABLET, FILM COATED ORAL DAILY
Status: DISCONTINUED | OUTPATIENT
Start: 2024-07-28 | End: 2024-07-29 | Stop reason: HOSPADM

## 2024-07-28 RX ORDER — SODIUM CHLORIDE 0.9 % (FLUSH) 0.9 %
10 SYRINGE (ML) INJECTION
Status: DISCONTINUED | OUTPATIENT
Start: 2024-07-28 | End: 2024-07-28

## 2024-07-28 RX ORDER — NAPROXEN SODIUM 220 MG/1
81 TABLET, FILM COATED ORAL DAILY
Status: DISCONTINUED | OUTPATIENT
Start: 2024-07-28 | End: 2024-07-29 | Stop reason: HOSPADM

## 2024-07-28 RX ORDER — DEXTROSE MONOHYDRATE AND SODIUM CHLORIDE 5; .45 G/100ML; G/100ML
INJECTION, SOLUTION INTRAVENOUS CONTINUOUS
Status: DISCONTINUED | OUTPATIENT
Start: 2024-07-28 | End: 2024-07-28

## 2024-07-28 RX ORDER — ONDANSETRON HYDROCHLORIDE 2 MG/ML
4 INJECTION, SOLUTION INTRAVENOUS EVERY 6 HOURS PRN
Status: DISCONTINUED | OUTPATIENT
Start: 2024-07-28 | End: 2024-07-29 | Stop reason: HOSPADM

## 2024-07-28 RX ORDER — DEXTROSE MONOHYDRATE AND SODIUM CHLORIDE 5; .45 G/100ML; G/100ML
INJECTION, SOLUTION INTRAVENOUS CONTINUOUS PRN
Status: DISCONTINUED | OUTPATIENT
Start: 2024-07-28 | End: 2024-07-28

## 2024-07-28 RX ORDER — ARIPIPRAZOLE 2 MG/1
2 TABLET ORAL DAILY
Status: DISCONTINUED | OUTPATIENT
Start: 2024-07-28 | End: 2024-07-29 | Stop reason: HOSPADM

## 2024-07-28 RX ORDER — NAPROXEN SODIUM 220 MG/1
81 TABLET, FILM COATED ORAL DAILY
COMMUNITY

## 2024-07-28 RX ORDER — FLUTICASONE PROPIONATE 50 MCG
2 SPRAY, SUSPENSION (ML) NASAL DAILY
Status: DISCONTINUED | OUTPATIENT
Start: 2024-07-29 | End: 2024-07-29 | Stop reason: HOSPADM

## 2024-07-28 RX ORDER — IBUPROFEN 200 MG
24 TABLET ORAL
Status: DISCONTINUED | OUTPATIENT
Start: 2024-07-28 | End: 2024-07-29

## 2024-07-28 RX ORDER — CARBAMAZEPINE 100 MG/1
400 TABLET, CHEWABLE ORAL 2 TIMES DAILY
Status: DISCONTINUED | OUTPATIENT
Start: 2024-07-28 | End: 2024-07-29 | Stop reason: HOSPADM

## 2024-07-28 RX ORDER — ARIPIPRAZOLE 2 MG/1
2 TABLET ORAL DAILY
COMMUNITY

## 2024-07-28 RX ADMIN — INSULIN ASPART 2 UNITS: 100 INJECTION, SOLUTION INTRAVENOUS; SUBCUTANEOUS at 09:07

## 2024-07-28 RX ADMIN — SODIUM CHLORIDE 0.2 UNITS/KG/HR: 9 INJECTION, SOLUTION INTRAVENOUS at 08:07

## 2024-07-28 RX ADMIN — SODIUM CHLORIDE 1000 ML: 9 INJECTION, SOLUTION INTRAVENOUS at 06:07

## 2024-07-28 RX ADMIN — AMLODIPINE BESYLATE 2.5 MG: 2.5 TABLET ORAL at 12:07

## 2024-07-28 RX ADMIN — CARBAMAZEPINE 400 MG: 100 TABLET, CHEWABLE ORAL at 08:07

## 2024-07-28 RX ADMIN — ARIPIPRAZOLE 2 MG: 2 TABLET ORAL at 12:07

## 2024-07-28 RX ADMIN — FENTANYL CITRATE 50 MCG: 50 INJECTION INTRAMUSCULAR; INTRAVENOUS at 09:07

## 2024-07-28 RX ADMIN — SODIUM CHLORIDE 0.02 UNITS/KG/HR: 9 INJECTION, SOLUTION INTRAVENOUS at 09:07

## 2024-07-28 RX ADMIN — ENOXAPARIN SODIUM 30 MG: 100 INJECTION SUBCUTANEOUS at 04:07

## 2024-07-28 RX ADMIN — SODIUM CHLORIDE 1000 ML: 9 INJECTION, SOLUTION INTRAVENOUS at 07:07

## 2024-07-28 RX ADMIN — SODIUM CHLORIDE 0.1 UNITS/KG/HR: 9 INJECTION, SOLUTION INTRAVENOUS at 07:07

## 2024-07-28 RX ADMIN — ATORVASTATIN CALCIUM 80 MG: 40 TABLET, FILM COATED ORAL at 12:07

## 2024-07-28 RX ADMIN — INSULIN ASPART 5 UNITS: 100 INJECTION, SOLUTION INTRAVENOUS; SUBCUTANEOUS at 05:07

## 2024-07-28 RX ADMIN — ONDANSETRON 4 MG: 2 INJECTION INTRAMUSCULAR; INTRAVENOUS at 03:07

## 2024-07-28 RX ADMIN — ASPIRIN 81 MG CHEWABLE TABLET 81 MG: 81 TABLET CHEWABLE at 12:07

## 2024-07-28 RX ADMIN — ONDANSETRON 4 MG: 2 INJECTION INTRAMUSCULAR; INTRAVENOUS at 06:07

## 2024-07-28 RX ADMIN — SODIUM CHLORIDE 0.02 UNITS/KG/HR: 9 INJECTION, SOLUTION INTRAVENOUS at 10:07

## 2024-07-28 RX ADMIN — CARBAMAZEPINE 400 MG: 100 TABLET, CHEWABLE ORAL at 12:07

## 2024-07-28 RX ADMIN — CETIRIZINE HYDROCHLORIDE 10 MG: 5 TABLET, FILM COATED ORAL at 01:07

## 2024-07-28 RX ADMIN — INSULIN GLARGINE 10 UNITS: 100 INJECTION, SOLUTION SUBCUTANEOUS at 10:07

## 2024-07-28 RX ADMIN — ONDANSETRON 4 MG: 2 INJECTION INTRAMUSCULAR; INTRAVENOUS at 10:07

## 2024-07-28 RX ADMIN — VENLAFAXINE HYDROCHLORIDE 150 MG: 37.5 CAPSULE, EXTENDED RELEASE ORAL at 12:07

## 2024-07-28 RX ADMIN — DEXTROSE AND SODIUM CHLORIDE: 5; 450 INJECTION, SOLUTION INTRAVENOUS at 10:07

## 2024-07-28 NOTE — ED NOTES
Patient presents to ED with c/o hyperglycemic episodes. Reports he is supposed to take Novolog and a long acting insulin. Reports he hasn't taken the Novolog for several days, but family member reports he gave the patient his Novolog yesterday. Patient reports BG at home was in the 500s. BG in triage was 337. The family member gave the patient 10 units of Novolog around 0400 to treat high blood sugar. Patient reports nausea. Denies vomiting, abdominal pain, diarrhea, chills, fevers, body aches. Reports lower back pain that is relieved with laying on his stomach. GCS 15. Continuous monitoring in place at this time. Safety intact. Call light in reach at this time. Family member at bedside at this time. Denies further needs at this time.

## 2024-07-28 NOTE — ED NOTES
Report called to icu, all pt belongings on stretcher with patient, pt to unit on monitor on stretcher w/ iv pump by rn x2

## 2024-07-28 NOTE — ED NOTES
Pt refuses to lay on back so RN can place telemetry, pt says he has to stay on his stomach due to back pain, pt not allowing this RN to place cardiac monitoring

## 2024-07-28 NOTE — ED PROVIDER NOTES
Encounter Date: 7/28/2024       History     Chief Complaint   Patient presents with    Hyperglycemia     Pt arrives to the ED for hyperglycemia and vomiting. Blood sugar is triage 337. 10 units of Novalog given around 0400 for blood sugar around 500. Pt actively vomiting in triage.     Patient is a 44-year-old male with insulin-dependent diabetes who presents with nausea and vomiting.  This began yesterday.  No diarrhea.  He denies abdominal pain.  No fever.  Patient gave himself 10 units of NovoLog at 4:00 a.m. after checking his blood sugar which he says was >500.      Review of patient's allergies indicates:   Allergen Reactions    Vitamin c, e, zinc, copper combination no.11 Hives     Past Medical History:   Diagnosis Date    Anxiety     Depression     Diabetes mellitus     DKA (diabetic ketoacidoses)     Glaucoma     Hyperlipemia     Hypertension     Seizures      Past Surgical History:   Procedure Laterality Date    ARTHROSCOPIC DEBRIDEMENT OF SHOULDER Left 10/11/2022    Procedure: DEBRIDEMENT, SHOULDER, ARTHROSCOPIC ;  Surgeon: Hayden Frye MD;  Location: [address];  Service: Orthopedics;  Laterality: Left;    EYE SURGERY      cataracts - left 2019    SYNOVECTOMY OF SHOULDER Left 10/11/2022    Procedure: SYNOVECTOMY, SHOULDER;  Surgeon: Hayden Frye MD;  Location: [facility] OR [address] [place] [address];  Service: Orthopedics;  Laterality: Left;     Family History   Problem Relation Name Age of Onset    Anal fissures Neg Hx       Social History     Tobacco Use    Smoking status: Never    Smokeless tobacco: Never   Substance Use Topics    Alcohol use: No    Drug use: No     Review of Systems   Constitutional:  Negative for fever.   Gastrointestinal:  Positive for nausea and vomiting. Negative for abdominal pain and diarrhea.   All other systems reviewed and are negative.      Physical Exam     Initial Vitals [07/28/24 0553]   BP Pulse Resp Temp SpO2   (!) 114/53 92 20 98.1 °F (36.7 °C) 95 %      MAP       --          Physical Exam    Nursing note and vitals reviewed.  Constitutional: He appears distressed.   Cardiovascular:  Normal rate, regular rhythm and normal heart sounds.           Pulmonary/Chest: No respiratory distress.     Neurological: He is alert and oriented to person, place, and time.   Skin: Skin is warm and dry.         ED Course   Critical Care    Date/Time: 7/28/2024 9:20 AM    Performed by: Tian Nova MD  Authorized by: Tian Nova MD  Direct patient critical care time: 90 minutes  Additional history critical care time: 5 minutes  Ordering / reviewing critical care time: 15 minutes  Documentation critical care time: 15 minutes  Consulting other physicians critical care time: 5 minutes  Total critical care time (exclusive of procedural time) : 130 minutes  Critical care was time spent personally by me on the following activities: discussions with primary provider, examination of patient, obtaining history from patient or surrogate, ordering and performing treatments and interventions, ordering and review of laboratory studies, ordering and review of radiographic studies, pulse oximetry, re-evaluation of patient's condition and review of old charts.        Labs Reviewed   CBC W/ AUTO DIFFERENTIAL - Abnormal       Result Value    WBC 13.22 (*)     RBC 5.80      Hemoglobin 15.4      Hematocrit 47.5      MCV 82      MCH 26.6 (*)     MCHC 32.4      RDW 13.1      Platelets 266      MPV 12.6      Immature Granulocytes 0.3      Gran # (ANC) 11.6 (*)     Immature Grans (Abs) 0.04      Lymph # 0.9 (*)     Mono # 0.6      Eos # 0.0      Baso # 0.06      nRBC 0      Gran % 87.8 (*)     Lymph % 7.0 (*)     Mono % 4.4      Eosinophil % 0.0      Basophil % 0.5      Differential Method Automated     URINALYSIS - Abnormal    Specimen UA Urine, Clean Catch      Color, UA Yellow      Appearance, UA Hazy (*)     pH, UA 5.0      Specific Gravity, UA 1.020      Protein, UA 1+ (*)     Glucose, UA 4+ (*)      Ketones, UA 1+ (*)     Bilirubin (UA) 1+ (*)     Occult Blood UA Negative      Nitrite, UA Negative      Urobilinogen, UA 2.0-3.0 (*)     Leukocytes, UA Negative     BETA - HYDROXYBUTYRATE, SERUM - Abnormal    Beta-Hydroxybutyrate 4.1 (*)    URINALYSIS MICROSCOPIC - Abnormal    RBC, UA 0      WBC, UA 5      Bacteria Rare      Yeast, UA None      Hyaline Casts, UA 3 (*)     Microscopic Comment SEE COMMENT     COMPREHENSIVE METABOLIC PANEL - Abnormal    Sodium 136      Potassium 5.7 (*)     Chloride 99      CO2 19 (*)     Glucose 341 (*)     BUN 54 (*)     Creatinine 3.2 (*)     Calcium 9.6      Total Protein 8.5 (*)     Albumin 4.0      Total Bilirubin 0.2      Alkaline Phosphatase 116      AST 22      ALT 38      eGFR 24 (*)     Anion Gap 18 (*)    POCT GLUCOSE - Abnormal    POCT Glucose 309 (*)    POCT GLUCOSE - Abnormal    POCT Glucose 320 (*)    POCT GLUCOSE - Abnormal    POCT Glucose 306 (*)    POCT GLUCOSE - Abnormal    POCT Glucose 147 (*)    LIPASE    Lipase 6     MAGNESIUM    Magnesium 2.2     DRUG SCREEN PANEL, URINE EMERGENCY   BASIC METABOLIC PANEL   DRUG SCREEN PANEL, URINE EMERGENCY   POCT GLUCOSE MONITORING CONTINUOUS          Imaging Results    None          Medications   sodium chloride 0.9% flush 10 mL (has no administration in time range)   0.9%  NaCl infusion (0 mLs Intravenous Stopped 7/28/24 0946)   dextrose 10 % infusion (has no administration in time range)   dextrose 10 % infusion (has no administration in time range)   insulin regular 1 Units/mL in 0.9% NaCl 100 mL infusion (0.02 Units/kg/hr × 61.2 kg Intravenous New Bag 7/28/24 0941)   sodium chloride 0.9% bolus 1,000 mL 1,000 mL (0 mLs Intravenous Stopped 7/28/24 0724)   ondansetron injection 4 mg (4 mg Intravenous Given 7/28/24 0624)   sodium chloride 0.9% bolus 1,000 mL 1,000 mL (1,000 mLs Intravenous New Bag 7/28/24 0718)   fentaNYL 50 mcg/mL injection 50 mcg (50 mcg Intravenous Given 7/28/24 0909)     Medical Decision Making  DDx :   Including but not limited to :  Poorly controlled diabetes, diabetic ketoacidosis, dehydration, electrolyte abnormality, kidney injury, sepsis.    Emergent evaluation of a 44-year-old male with insulin-dependent diabetes presenting with multiple episodes of vomiting over the past couple of days.  Lab work is indicative of DKA.  Insulin and fluids have been started here in the emergency department.  Patient will be admitted by [facility].    Amount and/or Complexity of Data Reviewed  Labs: ordered.     Details: CBC with a white blood cell count of 13.22.  CMP with a potassium of 5.7, BUN of 54, anion gap of 18, CO2 of 19 and creatinine of 3.2.  Beta hydroxybutyrate is 4.1.  Discussion of management or test interpretation with external provider(s): I have discussed the patient's presentation as well as pertinent lab values with the [facility] resident.    Risk  Prescription drug management.  Decision regarding hospitalization.                                      Clinical Impression:  Final diagnoses:  [E10.10] Diabetic ketoacidosis without coma associated with type 1 diabetes mellitus (Primary)  [N17.9] BRYSON (acute kidney injury)          ED Disposition Condition    Admit Stable                Tian Nova MD  07/28/24 0993

## 2024-07-28 NOTE — CONSULTS
Consult Note  [facility]    Attending: Dr. Sosa  Admit Date: 7/28/2024  Today's Date: 07/28/2024    SUBJECTIVE:     HPI: Patient is a 44 y.o.-year-old male w/ PMHx Type 1 Diabetes (9.5 last A1c), HLD, HTN, depression/anxiety, seizures who presents to [facility] when he had nausea and vomiting since Friday 7/26. He states that he is compliant with his insulin, takes 10u long acting every night and takes postprandial novalog. But due to his nausea and vomiting since Friday he has not been taking his novalog because he didn't want to get hypoglycemic because he had not been eating. He reports still taking his long acting every night, and BGs Friday morning were in the 100s, 200s on Saturday morning, and 300s on morning of admit. He states that he has been taking his other medications including his seizure medication on Friday and Saturday. He reports no fevers, chills, recent illnesses, chest pain, shortness of breath, or abdominal pain. He denies diarrhea or constipation. He only reports nausea and NBNB vomiting since Friday. He did not take his daily medication this morning before coming to the hospital though.      In the ED, initial vitals Temp 98.1F, /53, HR 92, RR 20, SpO2 95% on room air. CBC significant for WBC 13.22, H/H of 15.4/47.5, CMP significant for potassium 5.7 CO2 19, glucose 300s, BUN/Cr of 54/3.2, GFR 24, and anion gap of 18. And Estimated Creatinine Clearance: 27.4 mL/min (A) (based on SCr of 3 mg/dL (H)). UA positive for 4+ glucose and 1+ ketones, BHB positive at 4.1. troponin negative.   Imaging showed no acute findings, EKG showed normal sinus rhythm.  VBG showed pH of 7.382, CO2 of 45.5, PO2 of 39.7, and HCO3 of 27.0.   In the ED patient was treated with fentanyl, lantus 10u, zofran, and NS boluses, and started on an insulin gtt. Patient was admitted to ICU for management of DKA and BRYSON.    OBJECTIVE:     Vital Signs Trends/Hx Reviewed  Vitals:    07/28/24 0951  "07/28/24 0955 07/28/24 1054 07/28/24 1129   BP: 138/69  (!) 143/74    BP Location: Left arm  Left arm    Patient Position: Lying  Lying    Pulse: 79 83 71 75   Resp: (!) 22  18 (!) 24   Temp:   98.4 °F (36.9 °C) 98.6 °F (37 °C)   TempSrc:   Oral Oral   SpO2:  97% 100%    Weight:    61 kg (134 lb 7.7 oz)   Height:    5' 10" (1.778 m)            Physical Exam  Vitals and nursing note reviewed.   Constitutional:       General: He is not in acute distress.     Appearance: Normal appearance. He is normal weight. He is ill-appearing. He is not toxic-appearing or diaphoretic.   HENT:      Head: Normocephalic and atraumatic.   Eyes:      General: No scleral icterus.     Extraocular Movements: Extraocular movements intact.      Pupils: Pupils are equal, round, and reactive to light.   Cardiovascular:      Rate and Rhythm: Normal rate and regular rhythm.      Pulses: Normal pulses.      Heart sounds: No murmur heard.     No friction rub. No gallop.   Pulmonary:      Effort: Pulmonary effort is normal. No respiratory distress.      Breath sounds: Normal breath sounds. No stridor. No wheezing, rhonchi or rales.   Abdominal:      General: Abdomen is flat. Bowel sounds are normal. There is no distension.      Tenderness: There is no abdominal tenderness. There is no guarding.   Musculoskeletal:         General: Normal range of motion.      Cervical back: Normal range of motion.      Right lower leg: No edema.      Left lower leg: No edema.   Skin:     General: Skin is warm.      Coloration: Skin is not jaundiced.      Findings: No bruising, erythema or rash.   Neurological:      General: No focal deficit present.      Mental Status: He is alert and oriented to person, place, and time.       Laboratory:  Recent Labs   Lab 07/28/24  0619 07/28/24  0954   WBC 13.22*  --    RBC 5.80  --    HGB 15.4  --    HCT 47.5 42.4     --    MCV 82  --    MCH 26.6*  --    MCHC 32.4  --      Recent Labs   Lab 07/28/24  0732 07/28/24  1009 "    142   K 5.7* 4.3   CL 99 104   CO2 19* 21*   BUN 54* 56*   CREATININE 3.2* 3.0*   CALCIUM 9.6 9.4   MG 2.2  --      Recent Labs   Lab 07/28/24  0954   PH 7.382   PCO2 45.5*   PO2 39.7*   HCO3 27.0   POCSATURATED 73.1*       ASSESSMENT & RECOMMENDATIONS     Neuro/Psych  AOx3 not sedated  #Depression/anxeity  History of depression/anxiety  - resume home Aripiprazole, Venlafaxine     #seizure disorder  -resume home carbamazepine      CV  #HLD  -continue home Asa 81, atorvastatin 80    #HTN  On lisinopril and amlodipine at home  Blood pressure 143/74 this morning  -continue home amlodipine  -adjust home meds based on blood pressures  -hold lisinopril in BRYSON on CKD     Pulm  No acute concerns, monitor O2     FEN/GI  F - LR gtt with insulin, then transition to insulin with 1/2 NS when BG < 200  E - came in with K 5.7, replete K while on insulin gtt  N: NPO until AG <12, CO2 > 18 then transition to cardiac renal diet    GI:  #nausea  -zofran PRN     RENAL  #BRYSON  History of poor PO intake with nausea and vomiting since Friday  Likely due to hypovolemia    BUN/Cr: 56/3.0, baseline 1.4  -Continue to monitor renal status and urine output  -fluid resuscitation per DKA gtts  -avoid hypotension  -f/u renal u/s  -f/u renal studies  -monitor on BMPs     Heme    Recent Labs   Lab 07/28/24  0619 07/28/24  0954   WBC 13.22*  --    RBC 5.80  --    HGB 15.4  --    HCT 47.5 42.4     --      H/H 15.4/47.5; stable  WBC 13.22  DVT prophylaxis: Lovenox renally dosed    Endo  #DKA  #DM  History of T1DM on 10u basiglar and novalog SSI TID at home  Last A1c 9.5  Has been feeling nauseous since 7/26, stopped taking his TID novalog. Likely exacerbated DKA due to not taking insulin.   Denies symptoms of infections and initial workup shows unlikely UTI/PNA or other infections  -insulin gtt in LR per DKA protocol  -transition to insulin in d5 1/2 NS when BG <200  -replete K PRN  -switch to subq insulin long acting when AG <12, CO2 >  18  -monitor on q4 BMPs  -monitor on q1h POCT glucose  -zofran PRN for nausea    ID  No acute concerns  CXR negative for acute cardiopulmonary findings  UA not suggestive of UTI  Denies any recent symptoms of illness (fevers, chills, shortness of breath, dysuria)     Feeding: NPO until gap closed  Analgesia: none  Sedation: n/a  Thrombo PPX: lovenox  Head of Bed: > 30 degrees  Ulcer PPX: none  Glucose: goal 140-180s  SBT/SAT: n/a  Bowel Regimen: none  Indwelling Lines: 2x PIV  Abx: none     Code Status: FULL      Coy Caldwell MD  [facility], PGY-2  07/28/2024

## 2024-07-28 NOTE — ED NOTES
Hospitalists at bedside  -hospitalists made aware there is no d5 infusion order at this time as suggested by insulin drip orders

## 2024-07-28 NOTE — ED NOTES
Attempted to call report, ICU reports pt will go to [phone] not [phone] and to call back in 10 min because nurse not available for report at this time

## 2024-07-28 NOTE — H&P
[facility] Hospital Medicine H&P Note     Admitting Team: [facility] Hospitalist Team A  Attending Physician: Thea Cid MD   Resident: Mark  Intern: James     Date of Admit: 7/28/2024    Chief Complaint     Hyperglycemia and emesis     Subjective:      History of Present Illness:  Pavel Field is a 44 y.o. male who has a PMHx of T1DM, HTN, HLD, seizure disorder, anxiety/depression. The patient presented to [facility] on 7/28/2024 with a primary complaint of hyperglycemia and vomiting.    The patient was in their usual state of health until Friday (7/26) when he reports that he had a few episodes of vomiting. Was still tolerating po at that time. States that he at breakfast the next day but continued to worsen and continued to have multiple episodes of emesis. States that he checked BG at home and gave himself 10 units of Novolog prior to arrival in ED. Has not taken any of his daily medications today. Denies fever, recent sick contacts, diarrhea, abdominal pain, dysuria, chest pain, shortness of breath. Per caregiver Grace, and brother Ayden, pt has been snacking frequently and not taking insulin for glucose correction.     In the ED, pt afebrile, HR 92, R 20, /53, MARIVEL. CBC with leukocytosis of 13K, CMP with K 5.7, BUN 54, AG 18, CO2 2 19, Cr 3.2, BHB 4.1. Started on insulin gtt and admitted to [facility] for DKA and further eval and management.      Past Medical History:  Past Medical History:   Diagnosis Date    Anxiety     Depression     Diabetes mellitus     DKA (diabetic ketoacidoses)     Glaucoma     Hyperlipemia     Hypertension     Seizures        Past Surgical History:  Past Surgical History:   Procedure Laterality Date    ARTHROSCOPIC DEBRIDEMENT OF SHOULDER Left 10/11/2022    Procedure: DEBRIDEMENT, SHOULDER, ARTHROSCOPIC ;  Surgeon: Hayden Frye MD;  Location: [facility] OR [address] [place] [address];  Service: Orthopedics;  Laterality: Left;    EYE SURGERY      cataracts - left 2019     "SYNOVECTOMY OF SHOULDER Left 10/11/2022    Procedure: SYNOVECTOMY, SHOULDER;  Surgeon: Hayden Frye MD;  Location: [facility] OR [address] [place] [address];  Service: Orthopedics;  Laterality: Left;       Allergies:  Review of patient's allergies indicates:   Allergen Reactions    Vitamin c, e, zinc, copper combination no.11 Hives       Home Medications:  Prior to Admission medications    Medication Sig Start Date End Date Taking? Authorizing Provider   atorvastatin (LIPITOR) 80 MG tablet Take 80 mg by mouth once daily.    Provider, Historical   BASAGLAR KWIKPEN U-100 INSULIN glargine 100 units/mL SubQ pen Inject 35 Units into the skin every evening. 10/14/22   Steve Cervantes MD   BD ULTRA-FINE BRUNILDA PEN NEEDLE 32 gauge x 5/32" Ndle Inject 1 Units into the skin 4 (four) times daily. 9/13/18   Provider, Historical   blood sugar diagnostic Strp 4 (four) times daily. 6/16/22   Provider, Historical   blood-glucose meter Misc USE TO TEST THREE TIMES DAILY AS DIRECTED for 30    Provider, Historical   blood-glucose sensor (DEXCOM G6 SENSOR) Josselin Change every 10 days 7/15/22   Provider, Historical   brimonidine 0.2% (ALPHAGAN) 0.2 % Drop Place 1 drop into both eyes 3 (three) times daily. 10/5/22   Provider, Historical   carBAMazepine (TEGRETOL) 200 mg tablet Take 400 mg by mouth 2 (two) times daily. 9/10/18   Provider, Historical   cetirizine (ZYRTEC) 10 MG tablet TAKE 1 TABLET BY MOUTH EVERY DAY Oral Once a day for 90 days    Provider, Historical   ciprofloxacin HCl (CILOXAN) 0.3 % ophthalmic solution Place 1 drop into both eyes 4 (four) times daily. 9/1/22   Provider, Historical   fluticasone propionate (FLONASE) 50 mcg/actuation nasal spray by Each Nostril route. 10/19/22   Provider, Historical   furosemide (LASIX) 20 MG tablet Take 20 mg by mouth once daily. 9/28/22   Provider, Historical   insulin aspart U-100 (NOVOLOG) 100 unit/mL (3 mL) InPn pen Inject 12 Units into the skin 3 (three) times daily with meals. 10/14/22 4/28/23  Helen" "MD Steve   lisinopriL (PRINIVIL,ZESTRIL) 20 MG tablet Take 20 tablets by mouth once daily. 22   Provider, Historical   potassium chloride SA (K-DUR,KLOR-CON) 20 MEQ tablet Take 20 mEq by mouth once daily. 22   Provider, Historical   prednisoLONE acetate (PRED FORTE) 1 % DrpS Place 1 drop into both eyes 2 (two) times daily. 22   Provider, Historical   TRUE METRIX GLUCOSE TEST STRIP Strp 4 (four) times daily. Test 22   Provider, Historical   venlafaxine (EFFEXOR-XR) 150 MG Cp24 Take 150 mg by mouth once daily. 10/21/22   Provider, Historical   venlafaxine (EFFEXOR-XR) 37.5 MG 24 hr capsule Take 37.5 mg by mouth once daily. 10/21/22   Provider, Historical   venlafaxine (EFFEXOR-XR) 75 MG 24 hr capsule Take 1 capsule by mouth once daily. 8/15/18   Provider, Historical       Family History:  Family History   Problem Relation Name Age of Onset    Anal fissures Neg Hx         Social History:  Social History     Tobacco Use    Smoking status: Never    Smokeless tobacco: Never   Substance Use Topics    Alcohol use: No    Drug use: No       Review of Systems:  Pertinent items are noted in HPI.     Health Maintaince :   Primary Care Physician: Ingrid Madrigal MD     Objective:   Last 24 Hour Vital Signs:  BP  Min: 114/53  Max: 144/92  Temp  Av.3 °F (36.8 °C)  Min: 98.1 °F (36.7 °C)  Max: 98.6 °F (37 °C)  Pulse  Av.2  Min: 71  Max: 92  Resp  Av.9  Min: 18  Max: 24  SpO2  Av.7 %  Min: 95 %  Max: 100 %  Height  Av' 11" (180.3 cm)  Min: 5' 10" (177.8 cm)  Max: 6' (182.9 cm)  Weight  Av.1 kg (134 lb 11.8 oz)  Min: 61 kg (134 lb 7.7 oz)  Max: 61.2 kg (135 lb)  Body mass index is 19.3 kg/m².  No intake/output data recorded.    Physical Examination:  General appearance: alert and cooperative  Head: Normocephalic, without obvious abnormality, atraumatic  Eyes:  conjunctiva clear, EOMI intact   Back:  no point spinal tenderness, no CVA tenderness   Lungs: clear to " auscultation bilaterally, no increased work of breathing, no wheezes rales or rhonchi   Heart:  regular rate and rhythm, no murmur appreciated   Abdomen:  soft, nontender, +bowel sounds  Extremities: extremities normal, atraumatic, no cyanosis or edema  Skin:  scattered abrasions to b/l LE, no purulence, drainage, or induration   Neurologic: Grossly normal      Laboratory:  Most Recent Data:  CBC:   Lab Results   Component Value Date    WBC 13.22 (H) 07/28/2024    HGB 15.4 07/28/2024    HCT 42.4 07/28/2024     07/28/2024    MCV 82 07/28/2024    RDW 13.1 07/28/2024     BMP:   Lab Results   Component Value Date     07/28/2024    K 4.3 07/28/2024     07/28/2024    CO2 21 (L) 07/28/2024    BUN 56 (H) 07/28/2024    CREATININE 3.0 (H) 07/28/2024     (H) 07/28/2024    CALCIUM 9.4 07/28/2024    MG 2.2 07/28/2024    PHOS 3.0 10/11/2022     LFTs:   Lab Results   Component Value Date    PROT 8.5 (H) 07/28/2024    ALBUMIN 4.0 07/28/2024    BILITOT 0.2 07/28/2024    AST 22 07/28/2024    ALKPHOS 116 07/28/2024    ALT 38 07/28/2024     Coags:   Lab Results   Component Value Date    INR 1.1 10/11/2022     FLP:   Lab Results   Component Value Date    CHOL 138 06/20/2024    HDL 60 (H) 06/20/2024    LDLCALC 64 06/20/2024    TRIG 70 06/20/2024    CHOLHDL 2.30 06/20/2024     DM:   Lab Results   Component Value Date    HGBA1C 9.5 (H) 06/20/2024    HGBA1C 9.4 (H) 01/29/2024    HGBA1C 9.6 (H) 09/28/2023    LDLCALC 64 06/20/2024    CREATININE 3.0 (H) 07/28/2024     Thyroid:   Lab Results   Component Value Date    TSH 2.898 08/17/2019     Cardiac:   Lab Results   Component Value Date    TROPONINI <0.006 07/28/2024     Urinalysis:   Lab Results   Component Value Date    LABURIN No growth 12/15/2016    COLORU Yellow 07/28/2024    SPECGRAV 1.020 07/28/2024    NITRITE Negative 07/28/2024    KETONESU 1+ (A) 07/28/2024    UROBILINOGEN 2.0-3.0 (A) 07/28/2024    WBCUA 5 07/28/2024       Trended Lab Data:  Recent Labs   Lab  07/28/24  0619 07/28/24  0732 07/28/24  0954 07/28/24  1009   WBC 13.22*  --   --   --    HGB 15.4  --   --   --    HCT 47.5  --  42.4  --      --   --   --    MCV 82  --   --   --    RDW 13.1  --   --   --    NA  --  136  --  142   K  --  5.7*  --  4.3   CL  --  99  --  104   CO2  --  19*  --  21*   BUN  --  54*  --  56*   CREATININE  --  3.2*  --  3.0*   GLU  --  341*  --  118*   PROT  --  8.5*  --   --    ALBUMIN  --  4.0  --   --    BILITOT  --  0.2  --   --    AST  --  22  --   --    ALKPHOS  --  116  --   --    ALT  --  38  --   --        Trended Cardiac Data:  Recent Labs   Lab 07/28/24 0732   TROPONINI <0.006       Microbiology Data:  N/a     Other Results:  EKG (my interpretation): NSR, rate 74, unchanged from prev, Qtc 463    Radiology:  Imaging Results              US Kidney (In process)                      X-Ray Chest AP Portable (Final result)  Result time 07/28/24 11:20:55      Final result by Joe Cuenca MD (07/28/24 11:20:55)                   Impression:      No convincing evidence of acute cardiopulmonary disease.      Electronically signed by: Joe Cuenca  Date:    07/28/2024  Time:    11:20               Narrative:    EXAMINATION:  XR CHEST AP PORTABLE    CLINICAL HISTORY:  Diabetic ketoacidosis, nausea; Type 1 diabetes mellitus with ketoacidosis without coma    TECHNIQUE:  Single frontal view of the chest was performed.    COMPARISON:  Chest radiograph performed 10/13/2022, 19:38 hours.    FINDINGS:  Monitoring leads overlie the chest.  Cardiomediastinal contours appear to be within normal limits    Lungs appear essentially clear.    No definite pneumothorax or large volume pleural effusion.    No acute findings in the visualized abdomen.    Osseous and soft tissue structures appear without definite acute change.                                       Assessment:     Pavel Field is a 44 y.o. male with a PMHx of T1DM, HTN, HLD, seizure disorder, anxiety/depression who  [place] [facility] [place] for nausea, emesis, and hyperglycemia. Patient admitted to [facility] for DKA, admitted to ICU for insulin gtt and DKA protocol.      Plan:     Diabetic Ketoacidosis   T1DM   - Plasma glucose 341, pH 7.38, HCO3- 19, Urine Ketone +, Beta Hydroxy 4.1, AG 18  - Precipitating cause suspect non-adherence to diet, family and caretaker report frequent snacking without insulin correction given   - Patient started on insulin gtt in the ED  Plan:   - fluids per DKA protocol  - continue insulin gtt  - start LA insulin prior to insulin gtt cessation   - q4h BMP  - q1h glucose checks  - K >3.3, Mg >2, replete as needed   - zofran prn for nausea      BRYSON on CKD 2-3   - Cr 1.8 on last outpatient labs, eGFR 47 (6/20/24)  - Cr 3.2 on admit   - urine studies pending  - renal US pending   - hold home lisinopril in setting of BRYSON  - avoid nephrotoxic medications   - caregiver states that they were previously referred to Nephrology, awaiting OP appointment     Seizure disorder   - continue home carbamazepine 400mg BID     HTN  - continue home amlodipine 2.5mg daily   - hold home lisinopril in setting of BRYSON    HLD  - continue home atorvastatin 80mg     Anxiety/depression  - continue home aripiprazole 2mg daily, venlafaxine 150mg daily       Code: Full   DVT: renally dosed lovenox  Diet: NPO    Disposition: admit to ICU for insulin gtt and DKA protocol     Zahida Ramirez, DO  [facility]/Peds PGY-2      [facility] Hospitalist Pager numbers:   [facility] Hospitalist Medicine Team A (Maria Isabel/Jose Roberto): [phone]  [facility] Hospitalist Medicine Team B (Lenore/Jeremias):  [phone]

## 2024-07-28 NOTE — PLAN OF CARE
Problem: Adult Inpatient Plan of Care  Goal: Plan of Care Review  Outcome: Progressing  Goal: Patient-Specific Goal (Individualized)  Outcome: Progressing  Goal: Absence of Hospital-Acquired Illness or Injury  Outcome: Progressing  Goal: Optimal Comfort and Wellbeing  Outcome: Progressing  Goal: Readiness for Transition of Care  Outcome: Progressing     Problem: Diabetes Comorbidity  Goal: Blood Glucose Level Within Targeted Range  Outcome: Progressing     Problem: Acute Kidney Injury/Impairment  Goal: Fluid and Electrolyte Balance  Outcome: Progressing  Goal: Improved Oral Intake  Outcome: Progressing  Goal: Effective Renal Function  Outcome: Progressing     Problem: Fall Injury Risk  Goal: Absence of Fall and Fall-Related Injury  Outcome: Progressing

## 2024-07-29 VITALS
OXYGEN SATURATION: 99 % | RESPIRATION RATE: 15 BRPM | TEMPERATURE: 98 F | SYSTOLIC BLOOD PRESSURE: 135 MMHG | HEART RATE: 65 BPM | HEIGHT: 70 IN | BODY MASS INDEX: 19.26 KG/M2 | DIASTOLIC BLOOD PRESSURE: 80 MMHG | WEIGHT: 134.5 LBS

## 2024-07-29 PROBLEM — N18.31 STAGE 3A CHRONIC KIDNEY DISEASE: Status: ACTIVE | Noted: 2024-07-29

## 2024-07-29 PROBLEM — E78.2 MIXED HYPERLIPIDEMIA: Status: ACTIVE | Noted: 2024-07-29

## 2024-07-29 PROBLEM — I10 PRIMARY HYPERTENSION: Status: ACTIVE | Noted: 2024-07-29

## 2024-07-29 LAB
ANION GAP SERPL CALC-SCNC: 10 MMOL/L (ref 8–16)
BASOPHILS # BLD AUTO: 0.05 K/UL (ref 0–0.2)
BASOPHILS NFR BLD: 0.4 % (ref 0–1.9)
BUN SERPL-MCNC: 37 MG/DL (ref 6–20)
CALCIUM SERPL-MCNC: 9.2 MG/DL (ref 8.7–10.5)
CHLORIDE SERPL-SCNC: 99 MMOL/L (ref 95–110)
CO2 SERPL-SCNC: 25 MMOL/L (ref 23–29)
CREAT SERPL-MCNC: 2 MG/DL (ref 0.5–1.4)
DIFFERENTIAL METHOD BLD: ABNORMAL
EOSINOPHIL # BLD AUTO: 0.1 K/UL (ref 0–0.5)
EOSINOPHIL NFR BLD: 0.9 % (ref 0–8)
ERYTHROCYTE [DISTWIDTH] IN BLOOD BY AUTOMATED COUNT: 13.2 % (ref 11.5–14.5)
EST. GFR  (NO RACE VARIABLE): 42 ML/MIN/1.73 M^2
GLUCOSE SERPL-MCNC: 318 MG/DL (ref 70–110)
HCT VFR BLD AUTO: 38.7 % (ref 40–54)
HGB BLD-MCNC: 12.3 G/DL (ref 14–18)
IMM GRANULOCYTES # BLD AUTO: 0.03 K/UL (ref 0–0.04)
IMM GRANULOCYTES NFR BLD AUTO: 0.2 % (ref 0–0.5)
LYMPHOCYTES # BLD AUTO: 2.2 K/UL (ref 1–4.8)
LYMPHOCYTES NFR BLD: 17.7 % (ref 18–48)
MCH RBC QN AUTO: 26.5 PG (ref 27–31)
MCHC RBC AUTO-ENTMCNC: 31.8 G/DL (ref 32–36)
MCV RBC AUTO: 83 FL (ref 82–98)
MONOCYTES # BLD AUTO: 1.1 K/UL (ref 0.3–1)
MONOCYTES NFR BLD: 8.5 % (ref 4–15)
NEUTROPHILS # BLD AUTO: 8.9 K/UL (ref 1.8–7.7)
NEUTROPHILS NFR BLD: 72.3 % (ref 38–73)
NRBC BLD-RTO: 0 /100 WBC
PLATELET # BLD AUTO: 233 K/UL (ref 150–450)
PMV BLD AUTO: 12.5 FL (ref 9.2–12.9)
POCT GLUCOSE: 162 MG/DL (ref 70–110)
POCT GLUCOSE: 285 MG/DL (ref 70–110)
POTASSIUM SERPL-SCNC: 4.4 MMOL/L (ref 3.5–5.1)
RBC # BLD AUTO: 4.65 M/UL (ref 4.6–6.2)
SODIUM SERPL-SCNC: 134 MMOL/L (ref 136–145)
WBC # BLD AUTO: 12.35 K/UL (ref 3.9–12.7)

## 2024-07-29 PROCEDURE — 25000003 PHARM REV CODE 250

## 2024-07-29 PROCEDURE — 85025 COMPLETE CBC W/AUTO DIFF WBC: CPT

## 2024-07-29 PROCEDURE — 36415 COLL VENOUS BLD VENIPUNCTURE: CPT | Performed by: STUDENT IN AN ORGANIZED HEALTH CARE EDUCATION/TRAINING PROGRAM

## 2024-07-29 PROCEDURE — 25000242 PHARM REV CODE 250 ALT 637 W/ HCPCS

## 2024-07-29 PROCEDURE — 80048 BASIC METABOLIC PNL TOTAL CA: CPT | Performed by: STUDENT IN AN ORGANIZED HEALTH CARE EDUCATION/TRAINING PROGRAM

## 2024-07-29 RX ORDER — ENOXAPARIN SODIUM 100 MG/ML
40 INJECTION SUBCUTANEOUS EVERY 24 HOURS
Status: DISCONTINUED | OUTPATIENT
Start: 2024-07-29 | End: 2024-07-29 | Stop reason: HOSPADM

## 2024-07-29 RX ADMIN — CETIRIZINE HYDROCHLORIDE 10 MG: 5 TABLET, FILM COATED ORAL at 08:07

## 2024-07-29 RX ADMIN — VENLAFAXINE HYDROCHLORIDE 150 MG: 37.5 CAPSULE, EXTENDED RELEASE ORAL at 08:07

## 2024-07-29 RX ADMIN — INSULIN GLARGINE 10 UNITS: 100 INJECTION, SOLUTION SUBCUTANEOUS at 08:07

## 2024-07-29 RX ADMIN — FLUTICASONE PROPIONATE 100 MCG: 50 SPRAY, METERED NASAL at 08:07

## 2024-07-29 RX ADMIN — INSULIN ASPART 5 UNITS: 100 INJECTION, SOLUTION INTRAVENOUS; SUBCUTANEOUS at 11:07

## 2024-07-29 RX ADMIN — CARBAMAZEPINE 400 MG: 100 TABLET, CHEWABLE ORAL at 08:07

## 2024-07-29 RX ADMIN — INSULIN ASPART 5 UNITS: 100 INJECTION, SOLUTION INTRAVENOUS; SUBCUTANEOUS at 07:07

## 2024-07-29 RX ADMIN — ATORVASTATIN CALCIUM 80 MG: 40 TABLET, FILM COATED ORAL at 08:07

## 2024-07-29 RX ADMIN — ARIPIPRAZOLE 2 MG: 2 TABLET ORAL at 08:07

## 2024-07-29 RX ADMIN — INSULIN ASPART 6 UNITS: 100 INJECTION, SOLUTION INTRAVENOUS; SUBCUTANEOUS at 07:07

## 2024-07-29 RX ADMIN — ASPIRIN 81 MG CHEWABLE TABLET 81 MG: 81 TABLET CHEWABLE at 08:07

## 2024-07-29 RX ADMIN — AMLODIPINE BESYLATE 2.5 MG: 2.5 TABLET ORAL at 08:07

## 2024-07-29 NOTE — PLAN OF CARE
The sw spoke to the pt and Grace and they didn't have a preference for hh. The sw called Fadia [phone] with [facility] b/c [facility] is down. She states she will place the pt on the board for [facility] b/c they have an opening for tomorrow. She will extract the hh orders from [facility]. Grace signed the Pt Choice form. The sw gave them a copy and placed the original in the chart.        07/29/24 1356   Post-Acute Status   Post-Acute Authorization Home Health   Home Health Status Set-up Complete/Auth obtained   Discharge Plan   Discharge Plan A Home Health

## 2024-07-29 NOTE — PROGRESS NOTES
"[facility] Hospital Medicine Progress Note    Primary Team: [facility] Hospitalist Team A  Attending Physician: Thea Cid MD  Resident: Mark  Intern: James    Subjective:      No acute events. S/p insulin gtt, no on basal insulin with prandial and SSI. Tolerating po. Denies concerns or pain this am, sitting up eating breakfast.      Objective:     Last 24 Hour Vital Signs:  BP  Min: 102/60  Max: 164/78  Temp  Av.3 °F (36.8 °C)  Min: 97.6 °F (36.4 °C)  Max: 98.6 °F (37 °C)  Pulse  Av.9  Min: 63  Max: 92  Resp  Av.4  Min: 11  Max: 39  SpO2  Av.1 %  Min: 95 %  Max: 100 %  Height  Av' 10" (177.8 cm)  Min: 5' 10" (177.8 cm)  Max: 5' 10" (177.8 cm)  Weight  Av kg (134 lb 7.7 oz)  Min: 61 kg (134 lb 7.7 oz)  Max: 61 kg (134 lb 7.7 oz)  I/O last 3 completed shifts:  In: 3004.2 [I.V.:1006.2; IV Piggyback:]  Out: 420 [Urine:420]    Physical Examination:  General appearance: alert and cooperative  Head: Normocephalic, without obvious abnormality, atraumatic  Eyes:  conjunctiva clear, EOMI intact   Back:  no point spinal tenderness, no CVA tenderness   Lungs: clear to auscultation bilaterally, no increased work of breathing, no wheezes rales or rhonchi   Heart:  regular rate and rhythm, no murmur appreciated   Abdomen:  soft, nontender, +bowel sounds  Extremities: extremities normal, atraumatic, no cyanosis or edema  Skin:  scattered abrasions to b/l LE, no purulence, drainage, or induration   Neurologic: Grossly normal      Laboratory:  Laboratory Data Reviewed: yes  Pertinent Findings:  Recent Labs   Lab 24  0619 24  0732 24  0954 24  1009 24  1409 24  1556 24  0325   WBC 13.22*  --   --   --   --   --  12.35   HGB 15.4  --   --   --   --   --  12.3*   HCT 47.5  --  42.4  --   --   --  38.7*     --   --   --   --   --  233   NA  --  136  --    < > 140 140 134*   K  --  5.7*  --    < > 4.4 4.3 4.4   CL  --  99  --    < > 103 104 99   CREATININE  " --  3.2*  --    < > 2.7* 2.7* 2.0*   BUN  --  54*  --    < > 53* 52* 37*   CO2  --  19*  --    < > 25 24 25   ALT  --  38  --   --   --   --   --    AST  --  22  --   --   --   --   --     < > = values in this interval not displayed.       Microbiology Data Reviewed: yes  Pertinent Findings:  N/a    Other Results:  EKG (my interpretation): NSR, rate 74, unchanged from prev, Qtc 463     Radiology Data Reviewed: yes  Pertinent Findings:  US Kidney   Final Result      No significant sonographic abnormality.         Electronically signed by: Ace Min   Date:    07/28/2024   Time:    12:26      X-Ray Chest AP Portable   Final Result      No convincing evidence of acute cardiopulmonary disease.         Electronically signed by: Joe Cuenca   Date:    07/28/2024   Time:    11:20          Current Medications:     Infusions:   D5 and 0.45% NaCl   Intravenous Continuous PRN            Scheduled:   amLODIPine  2.5 mg Oral Daily    ARIPiprazole  2 mg Oral Daily    aspirin  81 mg Oral Daily    atorvastatin  80 mg Oral Daily    carBAMazepine  400 mg Oral BID    cetirizine  10 mg Oral Daily    enoxparin  30 mg Subcutaneous Q24H (prophylaxis, 1700)    fluticasone propionate  2 spray Each Nostril Daily    insulin aspart U-100  5 Units Subcutaneous TIDWM    insulin glargine U-100  10 Units Subcutaneous Daily    venlafaxine  150 mg Oral Daily        PRN:    Current Facility-Administered Medications:     acetaminophen, 650 mg, Oral, Q6H PRN    D10W, , Intravenous, PRN    D10W, , Intravenous, PRN    dextrose 10%, 12.5 g, Intravenous, PRN    dextrose 10%, 12.5 g, Intravenous, PRN    dextrose 10%, 12.5 g, Intravenous, PRN    dextrose 10%, 25 g, Intravenous, PRN    dextrose 10%, 25 g, Intravenous, PRN    dextrose 10%, 25 g, Intravenous, PRN    D5 and 0.45% NaCl, , Intravenous, Continuous PRN    glucagon (human recombinant), 1 mg, Intramuscular, PRN    glucagon (human recombinant), 1 mg, Intramuscular, PRN    glucose, 16 g, Oral, PRN     glucose, 16 g, Oral, PRN    glucose, 24 g, Oral, PRN    glucose, 24 g, Oral, PRN    insulin aspart U-100, 0-10 Units, Subcutaneous, QID (AC + HS) PRN    ondansetron, 4 mg, Intravenous, Q6H PRN    sodium chloride 0.9%, 10 mL, Intravenous, PRN      Assessment:     Pavel Field is a 44 y.o.male with a PMHx of T1DM, HTN, HLD, seizure disorder, anxiety/depression who presented to [facility] on 7/28 for nausea, emesis, and hyperglycemia. Patient admitted to [facility] for DKA, admitted to ICU for insulin gtt and DKA protocol.     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   - Cr 1.8 on last outpatient labs, eGFR 47 (6/20/24)  - Cr 3.2 on admit, improved to 2 today   - FENa 0.4%, pre-renal   - renal US with no significant abnormality   - hold home lisinopril in setting of BRYSON  - avoid nephrotoxic medications   - caregiver states that they were previously referred to Nephrology, awaiting OP appointment      Seizure disorder   - continue home carbamazepine 400mg BID      HTN  - continue home amlodipine 2.5mg daily   - hold home lisinopril in setting of BRYSON     HLD  - continue home atorvastatin 80mg      Anxiety/depression  - continue home aripiprazole 2mg daily, venlafaxine 150mg daily         Code: Full   DVT: renally dosed lovenox  Diet: Diabetic     Disposition: step down to floor       Zahida Ramriez, DO  [facility]/Peds PGY-2    [facility] Hospitalist Pager numbers:   [facility] Hospitalist Medicine Team A (Maria Isabel/Jose Roberto): [phone]  [facility] Hospitalist Medicine  Team B (Lenore/Jeremias):  [phone]

## 2024-07-29 NOTE — DISCHARGE SUMMARY
[facility] Discharge Summary    Primary Team: [facility] Team A  Attending Physician: Dr. Thea Cid  Resident: DAVID Flores   Intern: SOSA Ramirez    Date of Admit: 7/28/2024  Date of Discharge: 7/29/2024    Discharge to: Home with caregiver  Condition: Stable    Discharge Diagnoses     Patient Active Problem List   Diagnosis    Diabetic ketoacidosis without coma associated with type 1 diabetes mellitus    Essential hypertension    Seizure disorder    Type 1 diabetes mellitus with hyperglycemia    Orthostatic hypotension    BRYSON (acute kidney injury)    Acute metabolic encephalopathy    Severe malnutrition    Developmental disability    Fatigue    Depression    Leukocytosis    Alteration in skin integrity    Septic arthritis of shoulder, left    Therapeutic drug monitoring       Consultants and Procedures     Consultants:  Intensive Care    Procedures:   None    Brief History of Present Illness      Pavel Field is a 44 y.o. male who  has a past medical history of Anxiety, Depression, Diabetes mellitus, DKA (diabetic ketoacidoses), Glaucoma, Hyperlipemia, Hypertension, and Seizures.  The patient presented to [facility] on 7/28/2024 with a primary complaint of Hyperglycemia and vomiting found to be in DKA.     For the full HPI please refer to the History & Physical from this admission.    Hospital Course By Problem with Pertinent Findings     Diabetic Ketoacidosis - Resolved  T1DM   Plasma glucose 341, pH 7.38, HCO3- 19, Urine Ketone +, Beta Hydroxy 4.1, AG 18, Precipitating cause suspect non-adherence to diet, family and caretaker report frequent snacking without insulin correction given   - AG closed and patient corrected quickly with fluid resuscitation and insulin infusion   - Resume prior regimen per Endo: Basaglar 10u nightly 3-5u in the am before breakfast, Novolog 5u with meals + Correction on a sliding scale   - Followup with Endocrine within 1 month     BRYSON on CKD 2-3 - Improved  - Cr 1.8 on last  "outpatient labs, eGFR 47 (6/20/24), Cr 3.2 on admit, improved to 2 today   - FENa 0.4%, pre-renal   - renal US with no significant abnormality   - hold home lisinopril in setting of BRYSON  - avoid nephrotoxic medications   - Followup with Nephrology at [facility]      Seizure disorder   - continue home carbamazepine 400mg BID      HTN  - continue home amlodipine 2.5mg daily   - hold home lisinopril in setting of BRYSON     HLD  - continue home atorvastatin 80mg      Anxiety/depression  - continue home aripiprazole 2mg daily, venlafaxine 150mg daily     Discharge Medications        Medication List        CHANGE how you take these medications      venlafaxine 150 MG Cp24  Commonly known as: EFFEXOR-XR  What changed: Another medication with the same name was removed. Continue taking this medication, and follow the directions you see here.            CONTINUE taking these medications      amLODIPine 2.5 MG tablet  Commonly known as: NORVASC     ARIPiprazole 2 MG Tab  Commonly known as: ABILIFY     aspirin 81 MG Chew     atorvastatin 80 MG tablet  Commonly known as: LIPITOR     BASAGLAR KWIKPEN U-100 INSULIN 100 unit/mL (3 mL) Inpn pen  Generic drug: insulin glargine U-100 (Lantus)  Inject 35 Units into the skin every evening.     BD ULTRA-FINE BRUNILDA PEN NEEDLE 32 gauge x 5/32" Ndle  Generic drug: pen needle, diabetic     * blood sugar diagnostic Strp     * TRUE METRIX GLUCOSE TEST STRIP Strp  Generic drug: blood sugar diagnostic     blood-glucose meter Misc     carBAMazepine 200 mg tablet  Commonly known as: TEGRETOL     cetirizine 10 MG tablet  Commonly known as: ZYRTEC     DEXCOM G6 SENSOR Josselin  Generic drug: blood-glucose sensor     fluticasone propionate 50 mcg/actuation nasal spray  Commonly known as: FLONASE     insulin aspart U-100 100 unit/mL (3 mL) Inpn pen  Commonly known as: NovoLOG  Inject 12 Units into the skin 3 (three) times daily with meals.     lisinopriL 20 MG tablet  Commonly known as: PRINIVILZESTRIL           * " This list has 2 medication(s) that are the same as other medications prescribed for you. Read the directions carefully, and ask your doctor or other care provider to review them with you.                STOP taking these medications      brimonidine 0.2% 0.2 % Drop  Commonly known as: ALPHAGAN     ciprofloxacin HCl 0.3 % ophthalmic solution  Commonly known as: CILOXAN     furosemide 20 MG tablet  Commonly known as: LASIX     potassium chloride SA 20 MEQ tablet  Commonly known as: K-DUR,KLOR-CON     prednisoLONE acetate 1 % Drps  Commonly known as: PRED FORTE              Discharge Information:   Diet:  Diabetic Diet    Physical Activity:  As tolerated    Instructions:  1. Take all medications as prescribed  2. Keep all follow-up appointments  3. Return to the hospital or call your primary care physicians if any worsening symptoms such as nausea/vomiting, feeling tired,     Follow-Up Appointments:  Endocrinology lola 1 month  PCP in 2 weeks  Nephrology       Follow up items for PCP and tests that have not resulted at time of discharge:   Blood glucose management       Amanda Flores  [facility]/Pediatrics, HO-4

## 2024-07-29 NOTE — PLAN OF CARE
The sw met with the pt and Grace Nolasco(pt's [phone] who was at bedside to complete the assessment. The pt lives alone in [place] but receives 24;7 care(NOW Waiver/PCA)through [facility]. The pt's cousin Ayden Douglass [phone] assist the pt with hiring and firing his staff. The staff transports the pt to errands but Richie transports the pt to all his dr appt's(she has been working with the pt for at least 14 years). She states the pt's very defiant with not wanting to eat the correct foods. She states with the State they can't force the pt to eat what he doesn't want to eat. She states they can only try to redirect the pt. The pt gets upset and fires staff b/c he wants to do things his way. She states this isn't the pt's first time in the hospital for DKA. The pt has 24;7 staff with him at all times(7am-3pm/3pm-11pm/11pm-7am). The pt's independent with his ADL's and has the dme listed below. Grace will transport the pt home at d/c. The sw completed the white board in the pt's room with her name and contact info. The sw gave the pt a d/c brochure with her contact info on it. The sw encouraged them to call if they have any further questions or concerns. The sw will continue to follow the pt throughout his transitions of care and will assist with any d/c needs.     [facility] - Intensive Care  Initial Discharge Assessment       Primary Care Provider: Ingrid Madrigal MD    Admission Diagnosis: BRYSON (acute kidney injury) [N17.9]  Diabetic ketoacidosis without coma associated with type 1 diabetes mellitus [E10.10]    Admission Date: 7/28/2024  Expected Discharge Date: 7/29/2024    Transition of Care Barriers: (P) Does not adhere to care plan, Mental illness    Payor: MEDICARE / Plan: MEDICARE PART A & B / Product Type: Government /     Extended Emergency Contact Information  Primary Emergency Contact: Grace Nolasco  Home Phone: [phone]  Relation: Other  Secondary Emergency Contact:  Malorie Field  Address: [address]           [place], [place] [address] [place] [address] of [place]  Home Phone: [phone]  Mobile Phone: [phone]  Relation: Mother    Discharge Plan A: (P) Home Health  Discharge Plan B: (P) Other (TBD)      [id number] [place], [place] - [address]  [address]  [facility] [address]  Phone: [phone] Fax: [phone]    [id number] - [place], [place] - [address] AT [address]  [address]  [facility] [address]  Phone: [phone] Fax: [phone]    [facility]  [address] [facility] [address]  Phone: [phone] Fax: [phone]    [id number] [place], [place] - [address] AT [facility]  [address]  [facility] [address]  Phone: [phone] Fax: [phone]      Initial Assessment (most recent)       Adult Discharge Assessment - 07/29/24 1320          Discharge Assessment    Assessment Type Discharge Planning Assessment (P)      Confirmed/corrected address, phone number and insurance Yes (P)      Confirmed Demographics Correct on Facesheet (P)      Source of Information health care advocate (P)    Grace Nolasco(pt's nurse/Caregiver)    When was your last doctors appointment? -- (P)    about 2 weeks ago    Communicated MILADIS with patient/caregiver Yes (P)      Reason For Admission DKA (P)      People in Home alone (P)    pt has 24;7 staff with him(NOW Waiver)    Do you expect to return to your current living situation? Yes (P)      Do you have help at home or someone to help you manage your care at home? Yes (P)      Who are your caregiver(s) and their phone number(s)? Grace Nolasco(pt's nurse)[phone] (P)      Prior to hospitilization cognitive status: Alert/Oriented (P)      Current cognitive status: Alert/Oriented (P)      Walking or Climbing Stairs  Difficulty no (P)      Dressing/Bathing Difficulty no (P)      Home Accessibility wheelchair accessible (P)      Home Layout Able to live on 1st floor (P)      Equipment Currently Used at Home glucometer;other (see comments) (P)    [employer]    Patient currently being followed by outpatient case management? No (P)      Do you currently have service(s) that help you manage your care at home? Yes (P)      How Many hours does patient receive services -- (P)    24;7 care(NOW Waiver)    Name and Contact number of agency [employer] (P)      Is the pt/caregiver preference to resume services with current agency Yes (P)      Do you take prescription medications? Yes (P)      Do you have prescription coverage? Yes (P)      Coverage Medicare [place]/B,[place] of [place] (P)      Do you have any problems affording any of your prescribed medications? No (P)      Is the patient taking medications as prescribed? -- (P)    the pt's very defiant with taking his insulin and eating a correct diet    Who is going to help you get home at discharge? Grace Nolasco(pt's nurse)[phone] (P)      How do you get to doctors appointments? family or friend will provide (P)      Are you on dialysis? No (P)      Do you take coumadin? No (P)      Discharge Plan A Home Health (P)      Discharge Plan B Other (P)    TBD    DME Needed Upon Discharge  none (P)      Discharge Plan discussed with: Patient;Caregiver (P)      Name(s) and Number(s) Grace Nolasco(pt's nurse)[phone] (P)      Transition of Care Barriers Does not adhere to care plan;Mental illness (P)         Physical Activity    On average, how many days per week do you engage in moderate to strenuous exercise (like a brisk walk)? 0 days (P)      On average, how many minutes do you engage in exercise at this level? 0 min (P)         Financial Resource Strain    How hard is it for you to pay for the very basics like food, housing, medical care, and heating? Somewhat hard (P)         Housing  Stability    In the last 12 months, was there a time when you were not able to pay the mortgage or rent on time? No (P)      At any time in the past 12 months, were you homeless or living in a shelter (including now)? No (P)         Transportation Needs    Has the lack of transportation kept you from medical appointments, meetings, work or from getting things needed for daily living? No (P)         Food Insecurity    Within the past 12 months, you worried that your food would run out before you got the money to buy more. Sometimes true (P)      Within the past 12 months, the food you bought just didn't last and you didn't have money to get more. Sometimes true (P)         Stress    Do you feel stress - tense, restless, nervous, or anxious, or unable to sleep at night because your mind is troubled all the time - these days? To some extent (P)         Social Isolation    How often do you feel lonely or isolated from those around you?  Never (P)         Alcohol Use    Q1: How often do you have a drink containing alcohol? Never (P)      Q2: How many drinks containing alcohol do you have on a typical day when you are drinking? Patient does not drink (P)      Q3: How often do you have six or more drinks on one occasion? Never (P)         Utilities    In the past 12 months has the electric, gas, oil, or water company threatened to shut off services in your home? No (P)         Health Literacy    How often do you need to have someone help you when you read instructions, pamphlets, or other written material from your doctor or pharmacy? Always (P)    Grace reads all the pt's medical info

## 2024-07-29 NOTE — DISCHARGE INSTRUCTIONS
Continue Insulin Regimen as discussed with Dr. Blood Endocrinology   - Basaglar 10units nightly, 3-5units in the morning   - Novolog 5units with meals and additional sliding scale for BG correction   Followup with Endocrinology and Nephrology at [facility]    Maintain hydration and use insulin to cover high carb snacks such as sugary beverages and chips/sweets    Take care, Return to the ED if blood glucose is high and not improving, signs of abdominal pain vomiting and tiredness, or any concerns

## 2024-07-29 NOTE — PLAN OF CARE
"Care Plan    POC reviewed with Pavel Field. Patient verbalized understanding. Questions and concerns addressed. EKG changes noted. MD aware, refer to note. Pt progressing toward goals. Will continue to monitor. See below and flowsheets for full assessment and VS info.       Neuro:  [place] Coma Scale  Best Eye Response: 4-->(E4) spontaneous  Best Motor Response: 6-->(M6) obeys commands  Best Verbal Response: 5-->(V5) oriented  Holli Coma Scale Score: 15  Assessment Qualifiers: no eye obstruction present, patient not sedated/intubated  Pupil PERRLA: yes     24 hr Temp:  [97.6 °F (36.4 °C)-98.6 °F (37 °C)]     CV:   Rhythm: normal sinus rhythm  BP goals:   SBP < 140    Resp:    Plan: N/A    GI/:   Diet/Nutrition Received: consistent carb/diabetic diet  Last Bowel Movement: 07/27/24       Intake/Output Summary (Last 24 hours) at 7/29/2024 0546  Last data filed at 7/29/2024 0354  Gross per 24 hour   Intake 3604.22 ml   Output 1070 ml   Net 2534.22 ml     Unmeasured Output  Urine Occurrence: 1    Labs/Accuchecks:  Recent Labs   Lab 07/29/24  0325   WBC 12.35   RBC 4.65   HGB 12.3*   HCT 38.7*         Recent Labs   Lab 07/28/24  0732 07/28/24  1009 07/29/24  0325      < > 134*   K 5.7*   < > 4.4   CO2 19*   < > 25   CL 99   < > 99   BUN 54*   < > 37*   CREATININE 3.2*   < > 2.0*   ALKPHOS 116  --   --    ALT 38  --   --    AST 22  --   --    BILITOT 0.2  --   --     < > = values in this interval not displayed.    No results for input(s): "PROTIME", "INR", "APTT", "HEPANTIXA" in the last 168 hours.   Recent Labs   Lab 07/28/24  0732   TROPONINI <0.006       Electrolytes: N/A - electrolytes WDL  Accuchecks: ACHS    Gtts:   D5 and 0.45% NaCl   Intravenous Continuous PRN           Problem: Diabetes Comorbidity  Goal: Blood Glucose Level Within Targeted Range  Intervention: Monitor and Manage Glycemia  Flowsheets (Taken 7/29/2024 0433)  Glycemic Management:   blood glucose monitored   supplemental insulin " given   oral hydration promoted     Problem: Skin Injury Risk Increased  Goal: Skin Health and Integrity  Intervention: Optimize Skin Protection  Flowsheets (Taken 7/29/2024 0545)  Pressure Reduction Devices: foam padding utilized  Skin Protection:   incontinence pads utilized   pulse oximeter probe site changed   silicone foam dressing in place  Activity Management: Rolling - L1  Head of Bed (HOB) Positioning: HOB at 30-45 degrees     Problem: Fall Injury Risk  Goal: Absence of Fall and Fall-Related Injury  Intervention: Identify and Manage Contributors  Flowsheets (Taken 7/29/2024 0545)  Medication Review/Management: medications reviewed

## 2024-07-29 NOTE — PROGRESS NOTES
The sw met with the pt and Grace who was at bedside. She will transport the pt home at d/c. The sw stressed the importance of the pt going to his hsp f/u's and taking his medications. They both acknowledged understanding and states he will comply. The pt has no further Case Management needs or questions and is clear to d/c.     Future Appointments   Date Time Provider Department Center   8/5/2024  1:00 PM [facility]  Discharge Final Note    Primary Care Provider: Ingrid Madrigal MD    Expected Discharge Date: 7/29/2024    Final Discharge Note (most recent)       Final Note - 07/29/24 1320          Final Note    Assessment Type Discharge Planning Assessment        Post-Acute Status    Coverage Medicare [place]/B,[place] of [place]                     Important Message from Medicare             Contact Info       Nicolette Singh MD   Specialty: Internal Medicine    [address]  [address]  [place] [address]   Phone: [phone]       Next Steps: Follow up on 8/5/2024    Instructions: 1:00pm HSP F/U DX:[facility]   Specialty: Home Health Services, Home Therapy Services, Home Living Aide Services    [address]  [facility] [place] [address]   Phone: [phone]       Next Steps: Follow up on 7/30/2024    Instructions: The pt will be serviced by the home health agency listed above. The pt's home health nurse will call the pt's Caregiver with the arrival time.

## 2024-07-29 NOTE — CONSULTS
Food & Nutrition  Education    Diet Education: ADA  Time Spent: 15 minutes  Learners: pt      Nutrition Education provided with handouts:   Carbohydrate Counting for People with Diabetes    Comments:  Pt reports he has been diabetic since the age of 23. Reports he has been educated on the diet before but open to further education. Discussed carbohydrate containing foods and carb counting. Pt voiced understanding. Reports he had a dexacom but it is currently out. Encouraged him to track his carbohydrates and check his sugars. Handouts were provided.    All questions and concerns answered. Dietitian's contact information provided.       Follow-Up: 8/5/24    Please Re-consult as needed        Thanks!

## 2024-07-30 LAB
OHS QRS DURATION: 106 MS
OHS QRS DURATION: 112 MS
OHS QTC CALCULATION: 453 MS
OHS QTC CALCULATION: 463 MS

## 2025-06-20 NOTE — PLAN OF CARE
June 20, 2025      Sara Caceres  [address] [facility] [address]        Dear Ms. Caceres,    This letter is a reminder that our records show that you are due to complete an MRI MRCP Abdomen at this time.    You may call our imaging department at [phone] to schedule the exam. The orders have been entered in the computer.    If you have any questions or concerns, please feel free to call our office Monday through Friday between 8:30 AM and 4:30 PM.    Thank you for your prompt attention to this matter.      Sincerely,    Alf Augustin M.D.  Gastroenterology  [place] [address] [place]  [address] [phone]               Report received per MAGALI garcia.Care assumed. Pt sleeping; respirations even and unlabored. Pt VSS. Will continue to monitor. Pt awaiting to go to OR.

## (undated) DEVICE — DRAPE INCISE IOBAN 2 23X17IN

## (undated) DEVICE — CANNULA SHOULDER 10/BOX

## (undated) DEVICE — PAD ABDOMINAL STERILE 8X10IN

## (undated) DEVICE — GAUZE SPONGE 4X4 12PLY

## (undated) DEVICE — DRAPE THREE-QUARTER 53X77IN

## (undated) DEVICE — DRAPE THREE-QTR REINF 53X77IN

## (undated) DEVICE — SOL IRR NACL .9% 3000ML

## (undated) DEVICE — DRESSING GAUZE OIL EMUL 3X8

## (undated) DEVICE — APPLICATOR CHLORAPREP ORN 26ML

## (undated) DEVICE — BNDG COFLEX FOAM LF2 ST 6X5YD

## (undated) DEVICE — SOL WATER STRL IRR 1000ML

## (undated) DEVICE — DRAPE STERI-DRAPE 1000 17X11IN

## (undated) DEVICE — BLADE SURG CARBON STEEL SZ11

## (undated) DEVICE — DRESSING MEPORE PRO 6 X 7 CM

## (undated) DEVICE — NDL 18GA X1 1/2 REG BEVEL

## (undated) DEVICE — BLADE LANZA PREBENT 4.2MMX13CM

## (undated) DEVICE — PROBE ARTHO ENERGY 90 DEG

## (undated) DEVICE — SUT 4.0 ETHILON

## (undated) DEVICE — DRAPE SHOULDER BEACH CHAIR

## (undated) DEVICE — SPONGE GAUZE 16PLY 4X4

## (undated) DEVICE — DRESSING N ADH OIL EMUL 3X8

## (undated) DEVICE — TAPE SURG DURAPORE 2 X10YD

## (undated) DEVICE — GOWN NONREINF SET-IN SLV 2XL

## (undated) DEVICE — TRAY MINOR ORTHO OMC

## (undated) DEVICE — IMMOBILIZER CLIN SHOULDER LG

## (undated) DEVICE — PAD ABD 8X10 STERILE

## (undated) DEVICE — COVER MAYO STAND REINFRCD 30

## (undated) DEVICE — SEE MEDLINE ITEM 157216

## (undated) DEVICE — DRAPE STERI U-SHAPED 47X51IN

## (undated) DEVICE — NDL SPINAL 18GX3.5 SPINOCAN

## (undated) DEVICE — ELECTRODE REM PLYHSV RETURN 9

## (undated) DEVICE — DRESSING XEROFORM 1X8IN

## (undated) DEVICE — DRAPE U SPLIT SHEET 54X76IN